# Patient Record
Sex: FEMALE | Race: WHITE | NOT HISPANIC OR LATINO | Employment: FULL TIME | URBAN - METROPOLITAN AREA
[De-identification: names, ages, dates, MRNs, and addresses within clinical notes are randomized per-mention and may not be internally consistent; named-entity substitution may affect disease eponyms.]

---

## 2017-01-03 ENCOUNTER — APPOINTMENT (OUTPATIENT)
Dept: PHYSICAL THERAPY | Facility: CLINIC | Age: 32
End: 2017-01-03
Payer: COMMERCIAL

## 2017-01-03 PROCEDURE — 97110 THERAPEUTIC EXERCISES: CPT

## 2017-01-03 PROCEDURE — 97140 MANUAL THERAPY 1/> REGIONS: CPT

## 2017-01-05 ENCOUNTER — APPOINTMENT (OUTPATIENT)
Dept: PHYSICAL THERAPY | Facility: CLINIC | Age: 32
End: 2017-01-05
Payer: COMMERCIAL

## 2017-01-05 PROCEDURE — 97110 THERAPEUTIC EXERCISES: CPT

## 2017-01-05 PROCEDURE — 97140 MANUAL THERAPY 1/> REGIONS: CPT

## 2017-01-11 ENCOUNTER — APPOINTMENT (OUTPATIENT)
Dept: PHYSICAL THERAPY | Facility: CLINIC | Age: 32
End: 2017-01-11
Payer: COMMERCIAL

## 2017-01-11 PROCEDURE — 97140 MANUAL THERAPY 1/> REGIONS: CPT

## 2017-01-11 PROCEDURE — 97110 THERAPEUTIC EXERCISES: CPT

## 2017-01-13 ENCOUNTER — APPOINTMENT (OUTPATIENT)
Dept: PHYSICAL THERAPY | Facility: CLINIC | Age: 32
End: 2017-01-13
Payer: COMMERCIAL

## 2017-01-16 ENCOUNTER — APPOINTMENT (OUTPATIENT)
Dept: PHYSICAL THERAPY | Facility: CLINIC | Age: 32
End: 2017-01-16
Payer: COMMERCIAL

## 2017-01-16 PROCEDURE — 97110 THERAPEUTIC EXERCISES: CPT

## 2017-01-16 PROCEDURE — 97140 MANUAL THERAPY 1/> REGIONS: CPT

## 2017-07-12 ENCOUNTER — OFFICE VISIT (OUTPATIENT)
Dept: URGENT CARE | Facility: CLINIC | Age: 32
End: 2017-07-12
Payer: COMMERCIAL

## 2017-07-12 PROCEDURE — G0382 LEV 3 HOSP TYPE B ED VISIT: HCPCS

## 2017-07-12 PROCEDURE — 99283 EMERGENCY DEPT VISIT LOW MDM: CPT

## 2017-07-25 ENCOUNTER — OFFICE VISIT (OUTPATIENT)
Dept: URGENT CARE | Facility: CLINIC | Age: 32
End: 2017-07-25
Payer: COMMERCIAL

## 2017-07-25 ENCOUNTER — APPOINTMENT (OUTPATIENT)
Dept: URGENT CARE | Facility: CLINIC | Age: 32
End: 2017-07-25
Payer: COMMERCIAL

## 2017-07-25 ENCOUNTER — TRANSCRIBE ORDERS (OUTPATIENT)
Dept: URGENT CARE | Facility: CLINIC | Age: 32
End: 2017-07-25

## 2017-07-25 DIAGNOSIS — R07.9 CHEST PAIN, UNSPECIFIED TYPE: ICD-10-CM

## 2017-07-25 DIAGNOSIS — R07.9 CHEST PAIN, UNSPECIFIED TYPE: Primary | ICD-10-CM

## 2017-07-25 PROCEDURE — 99283 EMERGENCY DEPT VISIT LOW MDM: CPT

## 2017-07-25 PROCEDURE — G0382 LEV 3 HOSP TYPE B ED VISIT: HCPCS

## 2019-12-17 ENCOUNTER — APPOINTMENT (OUTPATIENT)
Dept: RADIOLOGY | Facility: MEDICAL CENTER | Age: 34
End: 2019-12-17
Payer: COMMERCIAL

## 2019-12-17 ENCOUNTER — OFFICE VISIT (OUTPATIENT)
Dept: URGENT CARE | Facility: MEDICAL CENTER | Age: 34
End: 2019-12-17
Payer: COMMERCIAL

## 2019-12-17 VITALS
SYSTOLIC BLOOD PRESSURE: 140 MMHG | DIASTOLIC BLOOD PRESSURE: 98 MMHG | TEMPERATURE: 98 F | HEART RATE: 83 BPM | RESPIRATION RATE: 20 BRPM | OXYGEN SATURATION: 98 %

## 2019-12-17 DIAGNOSIS — M54.41 ACUTE RIGHT-SIDED LOW BACK PAIN WITH RIGHT-SIDED SCIATICA: ICD-10-CM

## 2019-12-17 DIAGNOSIS — M25.511 ACUTE PAIN OF RIGHT SHOULDER: ICD-10-CM

## 2019-12-17 DIAGNOSIS — M54.2 NECK PAIN: ICD-10-CM
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
## 2019-12-17 PROCEDURE — 99214 OFFICE O/P EST MOD 30 MIN: CPT | Performed by: PHYSICIAN ASSISTANT

## 2019-12-17 PROCEDURE — 96372 THER/PROPH/DIAG INJ SC/IM: CPT | Performed by: PHYSICIAN ASSISTANT

## 2019-12-17 PROCEDURE — 72040 X-RAY EXAM NECK SPINE 2-3 VW: CPT

## 2019-12-17 PROCEDURE — 73030 X-RAY EXAM OF SHOULDER: CPT

## 2019-12-17 RX ORDER — KETOROLAC TROMETHAMINE 30 MG/ML
30 INJECTION, SOLUTION INTRAMUSCULAR; INTRAVENOUS ONCE
Status: COMPLETED | OUTPATIENT
Start: 2019-12-17 | End: 2019-12-17

## 2019-12-17 RX ADMIN — KETOROLAC TROMETHAMINE 30 MG: 30 INJECTION, SOLUTION INTRAMUSCULAR; INTRAVENOUS at 11:26

## 2019-12-17 NOTE — PATIENT INSTRUCTIONS
May take Tylenol or Motrin for pain  Ice the affected areas   follow-up with PCP if symptoms do not improve   report to ER if symptoms worsen    Acute Low Back Pain   WHAT YOU NEED TO KNOW:   Acute low back pain is sudden discomfort in your lower back area that lasts for up to 6 weeks  The discomfort makes it difficult to tolerate activity  DISCHARGE INSTRUCTIONS:   Return to the emergency department if:   · You have severe pain  · You have sudden stiffness and heaviness on both buttocks down to both legs  · You have numbness or weakness in one leg, or pain in both legs  · You have numbness in your genital area or across your lower back  · You cannot control your urine or bowel movements  Contact your healthcare provider if:   · You have a fever  · You have pain at night or when you rest     · Your pain does not get better with treatment  · You have pain that worsens when you cough or sneeze  · You suddenly feel something pop or snap in your back  · You have questions or concerns about your condition or care  Medicines: The following medicines may be ordered by your healthcare provider:  · Acetaminophen  decreases pain  It is available without a doctor's order  Ask how much to take and how often to take it  Follow directions  Acetaminophen can cause liver damage if not taken correctly  · NSAIDs  help decrease swelling and pain  This medicine is available with or without a doctor's order  NSAIDs can cause stomach bleeding or kidney problems in certain people  If you take blood thinner medicine, always ask your healthcare provider if NSAIDs are safe for you  Always read the medicine label and follow directions  · Prescription pain medicine  may be given  Ask your healthcare provider how to take this medicine safely  · Muscle relaxers  decrease pain by relaxing the muscles in your lower spine  · Take your medicine as directed    Contact your healthcare provider if you think your medicine is not helping or if you have side effects  Tell him of her if you are allergic to any medicine  Keep a list of the medicines, vitamins, and herbs you take  Include the amounts, and when and why you take them  Bring the list or the pill bottles to follow-up visits  Carry your medicine list with you in case of an emergency  Self-care:   · Stay active  as much as you can without causing more pain  Bed rest could make your back pain worse  Start with some light exercises such as walking  Avoid heavy lifting until your pain is gone  Ask for more information about the activities or exercises that are right for you  · Ice  helps decrease swelling, pain, and muscle spams  Put crushed ice in a plastic bag  Cover it with a towel  Place it on your lower back for 20 to 30 minutes every 2 hours  Do this for about 2 to 3 days after your pain starts, or as directed  · Heat  helps decrease pain and muscle spasms  Start to use heat after treatment with ice has stopped  Use a small towel dampened with warm water or a heating pad, or sit in a warm bath  Apply heat on the area for 20 to 30 minutes every 2 hours for as many days as directed  Alternate heat and ice  Prevent acute low back pain:   · Use proper body mechanics  ¨ Bend at the hips and knees when you  objects  Do not bend from the waist  Use your leg muscles as you lift the load  Do not use your back  Keep the object close to your chest as you lift it  Try not to twist or lift anything above your waist     ¨ Change your position often when you stand for long periods of time  Rest one foot on a small box or footrest, and then switch to the other foot often  ¨ Try not to sit for long periods of time  When you do, sit in a straight-backed chair with your feet flat on the floor  Never reach, pull, or push while you are sitting  · Do exercises that strengthen your back muscles  Warm up before you exercise   Ask your healthcare provider the best exercises for you  · Maintain a healthy weight  Ask your healthcare provider how much you should weigh  Ask him to help you create a weight loss plan if you are overweight  Follow up with your healthcare provider as directed:  Return for a follow-up visit if you still have pain after 1 to 3 weeks of treatment  You may need to visit an orthopedist if your back pain lasts more than 12 weeks  Write down your questions so you remember to ask them during your visits  © 2017 [facility] Information is for End User's use only and may not be sold, redistributed or otherwise used for commercial purposes  All illustrations and images included in CareNotes® are the copyrighted property of [employer]  or [employer]  The above information is an  only  It is not intended as medical advice for individual conditions or treatments  Talk to your doctor, nurse or pharmacist before following any medical regimen to see if it is safe and effective for you  Shoulder Pain   WHAT YOU NEED TO KNOW:   Shoulder pain is a common problem and can affect your daily activities  Pain can be caused by a problem within your shoulder  Shoulder pain may also be caused by pain that spreads to your shoulder from another part of your body  DISCHARGE INSTRUCTIONS:   Return to the emergency department if:   · You have severe pain  · You cannot move your arm or shoulder  · You have numbness or tingling in your shoulder or arm  Contact your healthcare provider if:   · Your pain gets worse or does not go away with treatment  · You have trouble moving your arm or shoulder  · You have questions or concerns about your condition or care  Medicines: You may need any of the following:  · Acetaminophen  decreases pain and fever  It is available without a doctor's order  Ask how much to take and how often to take it  Follow directions   Acetaminophen can cause liver damage if not taken correctly  · NSAIDs , such as ibuprofen, help decrease swelling, pain, and fever  This medicine is available with or without a doctor's order  NSAIDs can cause stomach bleeding or kidney problems in certain people  If you take blood thinner medicine, always ask your healthcare provider if NSAIDs are safe for you  Always read the medicine label and follow directions  · Take your medicine as directed  Contact your healthcare provider if you think your medicine is not helping or if you have side effects  Tell him of her if you are allergic to any medicine  Keep a list of the medicines, vitamins, and herbs you take  Include the amounts, and when and why you take them  Bring the list or the pill bottles to follow-up visits  Carry your medicine list with you in case of an emergency  Follow up with your healthcare provider or orthopedist as directed:  Write down your questions so you remember to ask them during your visits  Manage your symptoms:   · Apply ice  on your shoulder for 20 to 30 minutes every 2 hours or as directed  Use an ice pack, or put crushed ice in a plastic bag  Cover it with a towel  Ice helps prevent tissue damage and decreases swelling and pain  · Apply heat if ice does not help your symptoms  Apply heat on your shoulder for 20 to 30 minutes every 2 hours for as many days as directed  Heat helps decrease pain and muscle spasms  · Go to physical or occupational therapy as directed  A physical therapist teaches you exercises to help improve movement and strength, and to decrease pain  An occupational therapist teaches you skills to help with your daily activities  Prevent shoulder pain:   · Stretch and strengthen your shoulder  Use proper technique during exercises and sports  · Limit activities as directed  Try to avoid repeated overhead movements    © 2017 [employer] Information is for End User's use only and may not be sold, redistributed or otherwise used for commercial purposes  All illustrations and images included in CareNotes® are the copyrighted property of [employer]  or [employer]  The above information is an  only  It is not intended as medical advice for individual conditions or treatments  Talk to your doctor, nurse or pharmacist before following any medical regimen to see if it is safe and effective for you

## 2019-12-17 NOTE — LETTER
December 17, 2019     Patient: Mateusz Leroy   YOB: 1985   Date of Visit: 12/17/2019       To Whom it May Concern:    Sujata Ramirez was seen in my clinic on 12/17/2019  Please excuse from work  If you have any questions or concerns, please don't hesitate to call           Sincerely,          Tom Shore PA-C        CC: Mateusz Thakurciara

## 2021-12-26 ENCOUNTER — HOSPITAL ENCOUNTER (EMERGENCY)
Facility: HOSPITAL | Age: 36
Discharge: HOME | End: 2021-12-26
Attending: EMERGENCY MEDICINE | Admitting: EMERGENCY MEDICINE
Payer: COMMERCIAL

## 2021-12-26 VITALS
HEIGHT: 69 IN | BODY MASS INDEX: 32.58 KG/M2 | SYSTOLIC BLOOD PRESSURE: 149 MMHG | DIASTOLIC BLOOD PRESSURE: 97 MMHG | WEIGHT: 220 LBS | OXYGEN SATURATION: 98 % | TEMPERATURE: 97.8 F | RESPIRATION RATE: 18 BRPM | HEART RATE: 77 BPM

## 2021-12-26 DIAGNOSIS — U07.1 CLINICAL DIAGNOSIS OF COVID-19: Primary | ICD-10-CM

## 2021-12-26 PROCEDURE — 99282 EMERGENCY DEPT VISIT SF MDM: CPT | Performed by: PHYSICIAN ASSISTANT

## 2021-12-26 PROCEDURE — U0005 INFEC AGEN DETEC AMPLI PROBE: HCPCS | Performed by: PHYSICIAN ASSISTANT

## 2021-12-26 PROCEDURE — 99284 EMERGENCY DEPT VISIT MOD MDM: CPT

## 2021-12-26 PROCEDURE — U0003 INFECTIOUS AGENT DETECTION BY NUCLEIC ACID (DNA OR RNA); SEVERE ACUTE RESPIRATORY SYNDROME CORONAVIRUS 2 (SARS-COV-2) (CORONAVIRUS DISEASE [COVID-19]), AMPLIFIED PROBE TECHNIQUE, MAKING USE OF HIGH THROUGHPUT TECHNOLOGIES AS DESCRIBED BY CMS-2020-01-R: HCPCS | Performed by: PHYSICIAN ASSISTANT

## 2021-12-26 NOTE — DISCHARGE INSTRUCTIONS
Take 600mg of Ibuprofen every 6-8 hours with food as needed for pain  You may also take 1000mg of Tylenol every 6-8 hours as needed for pain with a maximum dose of 3000mg of tylenol per day  They are safe to take together or you may alternate them if you prefer  Take Vitamin D 2000 IU daily, Vitamin C 1000mg twice daily and a multivitamin with zinc in it as it has been shown to help COVID symptoms  These can all be found over the counter  You were considered safe to be discharged with COVID however if you get increasingly short of breath or have any chest pain, please return to the emergency department  It may be helpful for you to get a pulse oximeter from a pharmacy  If your oxygen level is 92% or less a while at rest, please return to the ER  Anybody that you have been around especially without a mask recently should also quarantine and get tested if they have symptoms  You should plan to quarantine for 14 days  Please follow-up with family doctor and if your symptoms are considered mild, you may only need to quarantine for 10 days and if you are vaccinated, you may only need to quarantine for 7 days

## 2021-12-26 NOTE — ED PROVIDER NOTES
HPI: Patient is a 39 y o  female who presents with 1 days of chills, cough, headache, sore throat, anorexia and fatigue which the patient describes at moderate  Patient states my coffee taste like water  The patient has had contact with people with similar symptoms  Patient's mother recently tested positive for COVID  The patient has not taken any medication  Patient is not vaccinated against COVID-19  No Known Allergies    Past Medical History:   Diagnosis Date    Brain aneurysm       Past Surgical History:   Procedure Laterality Date    BRAIN SURGERY       Social History     Tobacco Use    Smoking status: Never Smoker    Smokeless tobacco: Never Used   Vaping Use    Vaping Use: Never used   Substance Use Topics    Alcohol use: Never    Drug use: Never       Nursing notes reviewed  Physical Exam:  ED Triage Vitals [12/26/21 1417]   Temperature Pulse Respirations Blood Pressure SpO2   97 8 °F (36 6 °C) 77 18 149/97 98 %      Temp Source Heart Rate Source Patient Position - Orthostatic VS BP Location FiO2 (%)   Temporal Monitor Sitting Right arm --      Pain Score       --           ROS: Positive for headache, chest tightness, cough, anosmia, the remainder of a 10 organ system ROS was otherwise unremarkable  General: awake, alert, no acute distress    Head: normocephalic, atraumatic    Eyes: no scleral icterus  Ears: external ears normal, hearing grossly intact  Nose: external exam grossly normal, negative nasal discharge  Neck: symmetric, No JVD noted, trachea midline  Pulmonary: no respiratory distress, no tachypnea noted  Cardiovascular: appears well perfused  Abdomen: no distention noted  Musculoskeletal: no deformities noted, tone normal  Neuro: grossly non-focal  Psych: mood and affect appropriate    The patient is stable and has a history and physical exam consistent with a viral illness  COVID19 testing has been performed    I considered the patient's other medical conditions as applicable/noted above in my medical decision making  The patient is stable upon discharge  The plan is for supportive care at home  The patient (and any family present) verbalized understanding of the discharge instructions and warnings that would necessitate return to the Emergency Department  All questions were answered prior to discharge  Will disposition patient per current [facility] Guidelines:  Updated 2/3/21    Low risk patient SpO2 ? 90% (rest/ambulation)  For High Risk patient and SpO2 >/= 92% (rest/ambulation)   Dispo: DC and call PCP within 24 hours    DC Meds:  Vit D3 2000 IU PO Daily  Vit C 1g PO Q12  Multivitamin Daily  Consider Colchicine 0 6mg BID x3 days followed by   0 6mg daily x 27 days for high-risk, non-pregnant  patients (contraindicated in patients with eGFR < 30)    Home Pulse Ox:  Recommend returning if O2 drops below above levels     Low risk patient SpO2 ? 90% (at rest) but < 90% (w/ ambulation)  High Risk patient* SpO2 ? 92% (at rest) but <92% (w/ ambulation)   Dispo: Consider admission - if severe sx or to f/u closely with pcp   Consider DC - if mild sx    DC Meds:  Vit D3 2000 IU PO Daily  Vit C 1g PO Q12  Multivitamin Daily  Consider Colchicine 0 6mg BID x3 days   followed by 0 6mg daily x 27 days for highrisk, non-pregnant patients (contraindicated  in patients with eGFR < 30)     Home Pulse Ox:  Recommend returning if O2 drops below above levels     Low risk patient SpO2 < 90%  High Risk patient* SpO2 < 92%   Dispo:  Admit       **always call PCP in 24 hours if D/C  **High Risk Definition: age >71, BMI>35, immunocompromised, CKD or chronic heart/lung disease, pregnancy    Medications - No data to display  Final diagnoses:   Clinical diagnosis of COVID-19     Time reflects when diagnosis was documented in both MDM as applicable and the Disposition within this note     Time User Action Codes Description Comment    12/26/2021  2:13 PM Jacquelyn Appiah Add [U07 1] Clinical diagnosis of COVID-19       ED Disposition     ED Disposition Condition Date/Time Comment    Discharge Stable Sun Dec 26, 2021  2:12 PM Rupert Egciara discharge to home/self care  Follow-up Information    None       There are no discharge medications for this patient  No discharge procedures on file      Electronically Signed by       Chito Andrew PA-C  12/26/21 9262

## 2021-12-26 NOTE — Clinical Note
Marco Cantu was seen and treated in our emergency department on 12/26/2021  Diagnosis:     Ericlizbet Rodríguez    She may return on this date: 01/06/2022         If you have any questions or concerns, please don't hesitate to call        Burgess Rosanna PA-C    ______________________________           _______________          _______________  Hospital Representative                              Date                                Time

## 2021-12-27 LAB — SARS-COV-2 RNA RESP QL NAA+PROBE: NEGATIVE

## 2022-05-08 ENCOUNTER — APPOINTMENT (EMERGENCY)
Dept: RADIOLOGY | Facility: HOSPITAL | Age: 37
End: 2022-05-08
Payer: COMMERCIAL

## 2022-05-08 ENCOUNTER — HOSPITAL ENCOUNTER (EMERGENCY)
Facility: HOSPITAL | Age: 37
Discharge: HOME | End: 2022-05-08
Attending: EMERGENCY MEDICINE | Admitting: EMERGENCY MEDICINE
Payer: COMMERCIAL

## 2022-05-08 VITALS
RESPIRATION RATE: 16 BRPM | HEART RATE: 78 BPM | OXYGEN SATURATION: 99 % | TEMPERATURE: 98.5 F | DIASTOLIC BLOOD PRESSURE: 78 MMHG | SYSTOLIC BLOOD PRESSURE: 155 MMHG

## 2022-05-08 DIAGNOSIS — S89.90XA KNEE INJURY: Primary | ICD-10-CM

## 2022-05-08 PROCEDURE — 99283 EMERGENCY DEPT VISIT LOW MDM: CPT

## 2022-05-08 PROCEDURE — 96372 THER/PROPH/DIAG INJ SC/IM: CPT

## 2022-05-08 PROCEDURE — 73564 X-RAY EXAM KNEE 4 OR MORE: CPT

## 2022-05-08 PROCEDURE — 99285 EMERGENCY DEPT VISIT HI MDM: CPT | Performed by: EMERGENCY MEDICINE

## 2022-05-08 RX ORDER — IBUPROFEN 600 MG/1
600 TABLET ORAL EVERY 6 HOURS PRN
Qty: 30 TABLET | Refills: 0 | Status: SHIPPED | OUTPATIENT
Start: 2022-05-08 | End: 2022-06-03

## 2022-05-08 RX ORDER — OXYCODONE HYDROCHLORIDE AND ACETAMINOPHEN 5; 325 MG/1; MG/1
1 TABLET ORAL EVERY 6 HOURS PRN
Qty: 5 TABLET | Refills: 0 | Status: SHIPPED | OUTPATIENT
Start: 2022-05-08 | End: 2022-05-11

## 2022-05-08 RX ORDER — HYDROMORPHONE HCL/PF 1 MG/ML
1 SYRINGE (ML) INJECTION ONCE
Status: COMPLETED | OUTPATIENT
Start: 2022-05-08 | End: 2022-05-08

## 2022-05-08 RX ADMIN — HYDROMORPHONE HYDROCHLORIDE 1 MG: 1 INJECTION, SOLUTION INTRAMUSCULAR; INTRAVENOUS; SUBCUTANEOUS at 15:36

## 2022-05-08 NOTE — ED PROVIDER NOTES
History  Chief Complaint   Patient presents with    Knee Injury     53-year-old female presenting with left-sided knee pain after she was jumping over a puddle while hiking  Her knee bent inwards towards or other knee when she landed  She had pain at that area immediately  She denies weakness or numbness, head injury LOC, ac      Knee Pain  Pain details:     Quality:  Dull    Radiates to:  Does not radiate    Severity:  Mild    Timing:  Constant  Chronicity:  New  Associated symptoms: no back pain and no fever        None       Past Medical History:   Diagnosis Date    Brain aneurysm        Past Surgical History:   Procedure Laterality Date    BRAIN SURGERY         History reviewed  No pertinent family history  I have reviewed and agree with the history as documented  E-Cigarette/Vaping    E-Cigarette Use Never User      E-Cigarette/Vaping Substances     Social History     Tobacco Use    Smoking status: Never Smoker    Smokeless tobacco: Never Used   Vaping Use    Vaping Use: Never used   Substance Use Topics    Alcohol use: Never    Drug use: Never       Review of Systems   Constitutional: Negative for chills and fever  HENT: Negative for congestion, nosebleeds, rhinorrhea and sore throat  Eyes: Negative for pain and visual disturbance  Respiratory: Negative for cough and wheezing  Cardiovascular: Negative for chest pain and leg swelling  Gastrointestinal: Negative for abdominal distention, abdominal pain, diarrhea, nausea and vomiting  Genitourinary: Negative for dysuria and frequency  Musculoskeletal: Positive for arthralgias  Negative for back pain and joint swelling  Skin: Negative for rash and wound  Neurological: Negative for weakness and numbness  Psychiatric/Behavioral: Negative for decreased concentration and suicidal ideas  Physical Exam  Physical Exam  Vitals and nursing note reviewed  Constitutional:       Appearance: She is normal weight     HENT:      Head: Normocephalic and atraumatic  Eyes:      Conjunctiva/sclera: Conjunctivae normal       Pupils: Pupils are equal, round, and reactive to light  Cardiovascular:      Rate and Rhythm: Normal rate and regular rhythm  Pulmonary:      Effort: Pulmonary effort is normal  No respiratory distress  Abdominal:      General: Abdomen is flat  There is no distension  Musculoskeletal:         General: No swelling or deformity  Cervical back: Normal range of motion and neck supple  Comments: Generalized left knee tenderness, mild laxity with valgus stress   Skin:     General: Skin is dry  Coloration: Skin is not jaundiced  Neurological:      General: No focal deficit present  Mental Status: She is alert and oriented to person, place, and time  Psychiatric:         Mood and Affect: Mood normal          Thought Content:  Thought content normal          Vital Signs  ED Triage Vitals   Temperature Pulse Respirations Blood Pressure SpO2   05/08/22 1509 05/08/22 1509 05/08/22 1509 05/08/22 1509 05/08/22 1509   98 5 °F (36 9 °C) 78 16 155/78 99 %      Temp Source Heart Rate Source Patient Position - Orthostatic VS BP Location FiO2 (%)   05/08/22 1509 05/08/22 1509 05/08/22 1509 05/08/22 1509 --   Tympanic Monitor Sitting Left arm       Pain Score       05/08/22 1536       10 - Worst Possible Pain           Vitals:    05/08/22 1509   BP: 155/78   Pulse: 78   Patient Position - Orthostatic VS: Sitting         Visual Acuity      ED Medications  Medications   HYDROmorphone (DILAUDID) injection 1 mg (1 mg Intramuscular Given 5/8/22 1536)       Diagnostic Studies  Results Reviewed     None                 XR knee 4+ vw left injury    (Results Pending)              Procedures  Procedures         ED Course                                             MDM  Number of Diagnoses or Management Options  Knee injury  Diagnosis management comments: Patient with isolated injury to left knee, medial tenderness, suspect possible patella dislocation with spontaneous reduction, as well as MCL sprain  X-rays reassuring, patient placed in knee immobilizer and crutches, nonweightbearing with orthopedic follow-up  Pain improved after IM Dilaudid, she is comfortable with discharge at this time, ibuprofen Tylenol, ice, Percocet for breakthrough pain if needed       Amount and/or Complexity of Data Reviewed  Review and summarize past medical records: yes  Independent visualization of images, tracings, or specimens: yes    Risk of Complications, Morbidity, and/or Mortality  Presenting problems: high  Diagnostic procedures: minimal  Management options: high        Disposition  Final diagnoses:   Knee injury     Time reflects when diagnosis was documented in both MDM as applicable and the Disposition within this note     Time User Action Codes Description Comment    5/8/2022  4:33 PM Kenneth, [address] Knee injury       ED Disposition     ED Disposition Condition Date/Time Comment    Discharge Stable Sun May 8, 2022  4:33 PM Sreekanth Pak discharge to home/self care              Follow-up Information     Follow up With Specialties Details Why [address], DO Orthopedic Surgery Schedule an appointment as soon as possible for a visit   [address] [address]  [phone]            Patient's Medications   Discharge Prescriptions    IBUPROFEN (MOTRIN) 600 MG TABLET    Take 1 tablet (600 mg total) by mouth every 6 (six) hours as needed for mild pain       Start Date: 5/8/2022  End Date: --       Order Dose: 600 mg       Quantity: 30 tablet    Refills: 0    OXYCODONE-ACETAMINOPHEN (PERCOCET) 5-325 MG PER TABLET    Take 1 tablet by mouth every 6 (six) hours as needed for moderate pain or severe pain for up to 3 days Max Daily Amount: 4 tablets       Start Date: 5/8/2022  End Date: 5/11/2022       Order Dose: 1 tablet       Quantity: 5 tablet    Refills: 0           PDMP Review Value Time User    PDMP Reviewed  Yes 5/8/2022  4:07 PM Alondra Sharpe DO          ED Provider  Electronically Signed by           Alondra Sharpe DO  05/08/22 6977

## 2022-05-08 NOTE — Clinical Note
Gifty Estrada was seen and treated in our emergency department on 5/8/2022  No restrictions            Diagnosis:     Sandy    She may return on this date: 05/10/2022         If you have any questions or concerns, please don't hesitate to call        Ana Bunn, DO    ______________________________           _______________          _______________  Hospital Representative                              Date                                Time

## 2022-05-10 ENCOUNTER — OFFICE VISIT (OUTPATIENT)
Dept: OBGYN CLINIC | Facility: MEDICAL CENTER | Age: 37
End: 2022-05-10
Payer: COMMERCIAL

## 2022-05-10 VITALS
BODY MASS INDEX: 32.49 KG/M2 | DIASTOLIC BLOOD PRESSURE: 94 MMHG | SYSTOLIC BLOOD PRESSURE: 148 MMHG | HEART RATE: 90 BPM | HEIGHT: 69 IN

## 2022-05-10 DIAGNOSIS — S89.90XA KNEE INJURY: ICD-10-CM

## 2022-05-10 DIAGNOSIS — S83.207A MCMURRAY TEST POSITIVE, LEFT, INITIAL ENCOUNTER: ICD-10-CM

## 2022-05-10 DIAGNOSIS — S89.92XA KNEE INJURY, LEFT, INITIAL ENCOUNTER: Primary | ICD-10-CM

## 2022-05-10 PROCEDURE — 99204 OFFICE O/P NEW MOD 45 MIN: CPT | Performed by: FAMILY MEDICINE

## 2022-05-10 NOTE — LETTER
May 10, 2022     Patient: Mateusz Leroy  YOB: 1985  Date of Visit: 5/10/2022      To Whom it May Concern:    Sujata Ramirez is under my professional care  Cecilia Alexasandoval was seen in my office on 5/10/2022  Cecilia Wilson is excused from work due to the medical condition I am treating her for  If you have any questions or concerns, please don't hesitate to call           Sincerely,          Renetta Rodriguez MD        CC: Mateusz Leroy

## 2022-05-10 NOTE — PROGRESS NOTES
[facility] CARE SPECIALISTS [address]  [facility] [address] [phone]      Chief Complaint:  Chief Complaint   Patient presents with    Left Knee - Pain       Vitals:  /94   Pulse 90   Ht 5' 9" (1 753 m)   BMI 32 49 kg/m²     The following portions of the patient's history were reviewed and updated as appropriate: allergies, current medications, past family history, past medical history, past social history, past surgical history, and problem list       Subjective:   Patient ID: Alyssa Mast is a 44 y o  female  Here c/o L knee pain  5/8/22 she jumped over a puddle and leg went out to the side and fell to the ground  Cant bear weight or bend knee  Seen in ER  XR done  Crutches/knee immobilizer  Swelled up  Icing/elevated  Taking ibuprofen/perocet PRN>  Constant pain/dull pain  Sharp with movement  Review of Systems   Constitutional: Negative for fatigue and fever  Respiratory: Negative for shortness of breath  Cardiovascular: Negative for chest pain  Gastrointestinal: Negative for abdominal pain and nausea  Genitourinary: Negative for dysuria  Musculoskeletal: Positive for arthralgias  Skin: Negative for rash and wound  Neurological: Negative for weakness and headaches  Objective:  Left Knee Exam     Tenderness   The patient is experiencing tenderness in the medial joint line  Range of Motion   Extension: normal   Flexion: abnormal     Tests   Orlando:  Medial - positive Lateral - negative  Varus: negative Valgus: negative    Other   Swelling: mild  Effusion: no effusion present    Comments:  Difficulty bending knee- patient having too much pain  Observations   Left Knee   Negative for effusion  Physical Exam  Constitutional:       Appearance: Normal appearance  She is normal weight  HENT:      Head: Normocephalic  Eyes:      Extraocular Movements: Extraocular movements intact     Pulmonary: Effort: Pulmonary effort is normal    Musculoskeletal:         General: Tenderness present  Cervical back: Normal range of motion  Left knee: No effusion  Instability Tests: Medial Orlando test positive  Lateral Orlando test negative  Skin:     General: Skin is warm and dry  Neurological:      General: No focal deficit present  Mental Status: She is alert and oriented to person, place, and time  Mental status is at baseline  Psychiatric:         Mood and Affect: Mood normal          Behavior: Behavior normal          Thought Content: Thought content normal          Judgment: Judgment normal          I have personally reviewed pertinent films in PACS and my interpretation is XR-  L  knee- nml study  Assessment/Plan:  Assessment/Plan   Diagnoses and all orders for this visit:    Knee injury, left, initial encounter  -     MRI knee left  wo contrast; Future    Orlando test positive, left, initial encounter  -     MRI knee left  wo contrast; Future    Knee injury  -     Ambulatory Referral to Orthopedic Surgery        Return for f/u after MRI L knee, Recheck       Renata Wolfe MD

## 2022-05-10 NOTE — PATIENT INSTRUCTIONS
F/u for MRI  MRI- L knee- L knee pain/injury/pos nehemias test  xr neg  r/o meniscal tear  Patient has hx of brain aneurysm with stent/coil placed 10 years ago  Continue wearing knee immobilizer/crutches  Icing/OTC pain meds as needed

## 2022-05-18 ENCOUNTER — TELEPHONE (OUTPATIENT)
Dept: OBGYN CLINIC | Facility: HOSPITAL | Age: 37
End: 2022-05-18

## 2022-05-18 NOTE — TELEPHONE ENCOUNTER
Patient called stating she can't have her MRI due the aneurysm stent she has  [facility] can not locate her records  Is there another type of scan that she can have done?      # [phone]

## 2022-05-20 NOTE — TELEPHONE ENCOUNTER
Spoke with [facility] and received a note from Dr Joslyn Abreu stating "Her implantable device is MRI compatible " Note was emailed to MRI @ [place] to [employer] attention to have Radiology review for rescheduling  This note from Dr Joslyn Abreu will also be scanned into pts chart as well  I will notify patient once I receive notification to reschedule

## 2022-05-20 NOTE — TELEPHONE ENCOUNTER
With the contrast shortage I will obtain the stent  information  Angiogram stent placed 2008 and 2012 with [facility] Neuro Dr Jarad Snow [phone]  Patient is aware this may delay her scheduling she understands

## 2022-05-23 ENCOUNTER — OFFICE VISIT (OUTPATIENT)
Dept: OBGYN CLINIC | Facility: CLINIC | Age: 37
End: 2022-05-23
Payer: COMMERCIAL

## 2022-05-23 VITALS
TEMPERATURE: 98.3 F | HEIGHT: 69 IN | SYSTOLIC BLOOD PRESSURE: 139 MMHG | HEART RATE: 78 BPM | OXYGEN SATURATION: 99 % | BODY MASS INDEX: 31.1 KG/M2 | DIASTOLIC BLOOD PRESSURE: 89 MMHG | WEIGHT: 210 LBS

## 2022-05-23 DIAGNOSIS — S89.92XD LEFT KNEE INJURY, SUBSEQUENT ENCOUNTER: Primary | ICD-10-CM

## 2022-05-23 DIAGNOSIS — S83.207D POSITIVE MCMURRAY TEST OF LEFT KNEE, SUBSEQUENT ENCOUNTER: ICD-10-CM

## 2022-05-23 PROCEDURE — 99213 OFFICE O/P EST LOW 20 MIN: CPT | Performed by: FAMILY MEDICINE

## 2022-05-23 NOTE — PROGRESS NOTES
[facility] SPECIALISTS [place]  [address]  [place] [id number] [phone]      Chief Complaint:  Chief Complaint   Patient presents with    Left Knee - Follow-up       Vitals:  /89 (BP Location: Left arm, Patient Position: Sitting, Cuff Size: Standard)   Pulse 78   Temp 98 3 °F (36 8 °C)   Ht 5' 9" (1 753 m) Comment: verbal  Wt 95 3 kg (210 lb)   SpO2 99%   BMI 31 01 kg/m²     The following portions of the patient's history were reviewed and updated as appropriate: allergies, current medications, past family history, past medical history, past social history, past surgical history, and problem list       Subjective:   Patient ID: Viv Leiva is a 44 y o  female  Here for f/u  L knee pain  Unable to get MRI yet due to coils- just received clearance from surgeon- MRI compatible  She is still having R knee pain  Hurts to bend  Using knee immob/NWB  Icing/elevated  Taking ibuprofen/perocet PRn  Constant pain/dull pain  Sharp with movement          Review of Systems   Constitutional: Negative for fatigue and fever  Respiratory: Negative for shortness of breath  Cardiovascular: Negative for chest pain  Gastrointestinal: Negative for abdominal pain and nausea  Genitourinary: Negative for dysuria  Musculoskeletal: Positive for arthralgias and gait problem  Skin: Negative for rash and wound  Neurological: Positive for numbness  Negative for weakness and headaches  Objective:  Right Knee Exam     Tenderness   The patient is experiencing tenderness in the medial joint line  Range of Motion   Extension: normal   Flexion: abnormal     Tests   Varus: positive     Other   Effusion: effusion present          Observations     Right Knee   Positive for effusion  Physical Exam  Constitutional:       Appearance: Normal appearance  She is normal weight  HENT:      Head: Normocephalic     Eyes:      Extraocular Movements: Extraocular movements intact  Pulmonary:      Effort: Pulmonary effort is normal    Musculoskeletal:         General: Tenderness present  Cervical back: Normal range of motion  Right knee: Effusion present  Skin:     General: Skin is warm and dry  Neurological:      General: No focal deficit present  Mental Status: She is alert and oriented to person, place, and time  Mental status is at baseline  Psychiatric:         Mood and Affect: Mood normal          Behavior: Behavior normal          Thought Content: Thought content normal          Judgment: Judgment normal                Assessment/Plan:  Assessment/Plan   Diagnoses and all orders for this visit:    Left knee injury, subsequent encounter    Positive Orlando test of left knee, subsequent encounter        Return for f/u after MRI, Recheck       Pamela Johnson MD

## 2022-05-23 NOTE — LETTER
May 23, 2022     Patient: Melanie Nava  YOB: 1985  Date of Visit: 5/23/2022      To Whom it May Concern:    Sukhi Beltran is under my professional care  Rodriguez Hernandez was seen in my office on 5/23/2022  Rodriguez Hernandez is excused from work due to the medical condition I am treating them for       If you have any questions or concerns, please don't hesitate to call           Sincerely,          Petros Stewart MD        CC: Melanie Nava

## 2022-05-24 ENCOUNTER — TELEPHONE (OUTPATIENT)
Dept: OBGYN CLINIC | Facility: HOSPITAL | Age: 37
End: 2022-05-24

## 2022-05-24 NOTE — TELEPHONE ENCOUNTER
Pt sees Dr Neal Chino    Pt is calling stating that she is unable to get an MRI of her lt knee due to the fact that [facility] lost her medical records for her  surgery so they cant identify what metals were used for the coils and stents Pt would like to know if there are any other option such as a CT or something else      # [phone]

## 2022-05-27 DIAGNOSIS — S89.92XD LEFT KNEE INJURY, SUBSEQUENT ENCOUNTER: Primary | ICD-10-CM

## 2022-05-27 DIAGNOSIS — S83.207D POSITIVE MCMURRAY TEST OF LEFT KNEE, SUBSEQUENT ENCOUNTER: ICD-10-CM

## 2022-05-27 NOTE — TELEPHONE ENCOUNTER
Pt is calling to see what the next step will be to check her lt knee since the MRI can not be done    #[phone]

## 2022-05-31 DIAGNOSIS — S83.207D MCMURRAY TEST POSITIVE, LEFT, SUBSEQUENT ENCOUNTER: Primary | ICD-10-CM

## 2022-06-01 DIAGNOSIS — S83.207D POSITIVE MCMURRAY TEST OF LEFT KNEE, SUBSEQUENT ENCOUNTER: Primary | ICD-10-CM

## 2022-06-02 ENCOUNTER — TELEPHONE (OUTPATIENT)
Dept: OBGYN CLINIC | Facility: CLINIC | Age: 37
End: 2022-06-02

## 2022-06-02 NOTE — TELEPHONE ENCOUNTER
Spoke to Praveen   She will call scheduling and have it scheduled   She is hoping to have it done in [facility]

## 2022-06-02 NOTE — TELEPHONE ENCOUNTER
[facility]  RE:  CT Scan Left knee  CB#: [phone]      Patient called to get scheduled and they gave her next available which is 7/15/22   She is wanting to know what she can do until then , she is having a constant pain with a crunching pop pain  She is looking for a call back to discuss her options

## 2022-06-03 DIAGNOSIS — S89.92XD LEFT KNEE INJURY, SUBSEQUENT ENCOUNTER: Primary | ICD-10-CM

## 2022-06-03 RX ORDER — DICLOFENAC POTASSIUM 50 MG/1
50 TABLET, FILM COATED ORAL 3 TIMES DAILY PRN
Qty: 60 TABLET | Refills: 1 | Status: SHIPPED | OUTPATIENT
Start: 2022-06-03

## 2022-06-06 ENCOUNTER — TELEPHONE (OUTPATIENT)
Dept: OBGYN CLINIC | Facility: HOSPITAL | Age: 37
End: 2022-06-06

## 2022-06-06 NOTE — TELEPHONE ENCOUNTER
Patient sees Dr Leon Stewart from [facility] is calling to see if patient has MRI scheduled  Advised it is a CT and yes 7/7  She also asked if patient had a follow up scheduled yet   Advised no

## 2022-06-06 NOTE — TELEPHONE ENCOUNTER
Spoke with patient and relayed response   She is waiting to have a CT done due to the lack of contrast

## 2022-06-16 ENCOUNTER — HOSPITAL ENCOUNTER (EMERGENCY)
Facility: HOSPITAL | Age: 37
Discharge: HOME | End: 2022-06-16
Attending: FAMILY MEDICINE
Payer: COMMERCIAL

## 2022-06-16 ENCOUNTER — APPOINTMENT (EMERGENCY)
Dept: NON INVASIVE DIAGNOSTICS | Facility: HOSPITAL | Age: 37
End: 2022-06-16
Payer: COMMERCIAL

## 2022-06-16 ENCOUNTER — APPOINTMENT (EMERGENCY)
Dept: RADIOLOGY | Facility: HOSPITAL | Age: 37
End: 2022-06-16
Payer: COMMERCIAL

## 2022-06-16 VITALS
RESPIRATION RATE: 18 BRPM | SYSTOLIC BLOOD PRESSURE: 162 MMHG | WEIGHT: 210 LBS | OXYGEN SATURATION: 100 % | DIASTOLIC BLOOD PRESSURE: 87 MMHG | BODY MASS INDEX: 31.01 KG/M2 | TEMPERATURE: 98.2 F | HEART RATE: 74 BPM

## 2022-06-16 DIAGNOSIS — M79.89 LEG SWELLING: Primary | ICD-10-CM

## 2022-06-16 LAB
ANION GAP SERPL CALCULATED.3IONS-SCNC: 10 MMOL/L (ref 4–13)
BASOPHILS # BLD AUTO: 0.03 THOUSANDS/ΜL (ref 0–0.1)
BASOPHILS NFR BLD AUTO: 0 % (ref 0–1)
BUN SERPL-MCNC: 15 MG/DL (ref 5–25)
CALCIUM SERPL-MCNC: 9.5 MG/DL (ref 8.4–10.2)
CARDIAC TROPONIN I PNL SERPL HS: <2 NG/L
CHLORIDE SERPL-SCNC: 103 MMOL/L (ref 96–108)
CO2 SERPL-SCNC: 24 MMOL/L (ref 21–32)
CREAT SERPL-MCNC: 0.66 MG/DL (ref 0.6–1.3)
D DIMER PPP FEU-MCNC: 0.39 UG/ML FEU
EOSINOPHIL # BLD AUTO: 0.18 THOUSAND/ΜL (ref 0–0.61)
EOSINOPHIL NFR BLD AUTO: 2 % (ref 0–6)
ERYTHROCYTE [DISTWIDTH] IN BLOOD BY AUTOMATED COUNT: 12.8 % (ref 11.6–15.1)
GFR SERPL CREATININE-BSD FRML MDRD: 112 ML/MIN/1.73SQ M
GLUCOSE SERPL-MCNC: 81 MG/DL (ref 65–140)
HCT VFR BLD AUTO: 41.4 % (ref 34.8–46.1)
HGB BLD-MCNC: 13.5 G/DL (ref 11.5–15.4)
IMM GRANULOCYTES # BLD AUTO: 0.02 THOUSAND/UL (ref 0–0.2)
IMM GRANULOCYTES NFR BLD AUTO: 0 % (ref 0–2)
LYMPHOCYTES # BLD AUTO: 1.75 THOUSANDS/ΜL (ref 0.6–4.47)
LYMPHOCYTES NFR BLD AUTO: 22 % (ref 14–44)
MCH RBC QN AUTO: 29.3 PG (ref 26.8–34.3)
MCHC RBC AUTO-ENTMCNC: 32.6 G/DL (ref 31.4–37.4)
MCV RBC AUTO: 90 FL (ref 82–98)
MONOCYTES # BLD AUTO: 0.62 THOUSAND/ΜL (ref 0.17–1.22)
MONOCYTES NFR BLD AUTO: 8 % (ref 4–12)
NEUTROPHILS # BLD AUTO: 5.27 THOUSANDS/ΜL (ref 1.85–7.62)
NEUTS SEG NFR BLD AUTO: 68 % (ref 43–75)
NRBC BLD AUTO-RTO: 0 /100 WBCS
PLATELET # BLD AUTO: 192 THOUSANDS/UL (ref 149–390)
PMV BLD AUTO: 9.6 FL (ref 8.9–12.7)
POTASSIUM SERPL-SCNC: 3.4 MMOL/L (ref 3.5–5.3)
RBC # BLD AUTO: 4.6 MILLION/UL (ref 3.81–5.12)
SODIUM SERPL-SCNC: 137 MMOL/L (ref 135–147)
WBC # BLD AUTO: 7.87 THOUSAND/UL (ref 4.31–10.16)

## 2022-06-16 PROCEDURE — 93971 EXTREMITY STUDY: CPT

## 2022-06-16 PROCEDURE — 99284 EMERGENCY DEPT VISIT MOD MDM: CPT

## 2022-06-16 PROCEDURE — 85379 FIBRIN DEGRADATION QUANT: CPT

## 2022-06-16 PROCEDURE — 93971 EXTREMITY STUDY: CPT | Performed by: SURGERY

## 2022-06-16 PROCEDURE — 73610 X-RAY EXAM OF ANKLE: CPT

## 2022-06-16 PROCEDURE — 99285 EMERGENCY DEPT VISIT HI MDM: CPT

## 2022-06-16 PROCEDURE — 85025 COMPLETE CBC W/AUTO DIFF WBC: CPT

## 2022-06-16 PROCEDURE — 93005 ELECTROCARDIOGRAM TRACING: CPT

## 2022-06-16 PROCEDURE — 36415 COLL VENOUS BLD VENIPUNCTURE: CPT

## 2022-06-16 PROCEDURE — 80048 BASIC METABOLIC PNL TOTAL CA: CPT

## 2022-06-16 PROCEDURE — 84484 ASSAY OF TROPONIN QUANT: CPT

## 2022-06-16 NOTE — DISCHARGE INSTRUCTIONS
Please follow-up with your orthopedic surgeon for further evaluation of her symptoms  Continue with elevating and icing the affected joint to reduce swelling  Continue taking ibuprofen and Tylenol as needed for pain control  If he developed any significant changes or worsening condition please return to the emergency department for re-evaluation

## 2022-06-16 NOTE — ED PROVIDER NOTES
History  Chief Complaint   Patient presents with    Leg Swelling     Left ankle swelling and shortness of breath x2 days  On may 8th pt injured knee  Pt has been wearing knee immobilizer since may 8th  Pt reports shooting pains inner ankle  60-year-old previously healthy female presents the emergency department with unilateral leg swelling in her left leg that has progressively worsened over the past few days  She had an injury to her left knee on May 8th of this year and was placed in a knee immobilizer with orthopedic follow-up  She was ordered to get a CT with contrast of her left knee as she is unable to get an MRI with her previous history of brain aneurysm repair with coils  She has not been able to get the CT due to the contrast shortage  She now comes in reporting left ankle swelling that started several days ago  She has tenderness to touch, but no pain at rest  She is able to move the ankle  She has been limiting the mobility of her left leg due to her injury, and she has not been moving the ankle either  She has been primarily wheelchair bound  Not on anticoagulation  She does report stiffness in the ankle joint  She does admit to some intermittent chest discomfort and SOB, but she believes this could be related to anxiety  Denies chest pain and SOB at the time of my evaluation  Denies abdominal pain, right leg pain, right leg swelling, fever, chills, headache, dizziness, hx of blood clot  Denies further injury to the left ankle or leg          History provided by:  Patient   used: No    Ankle Pain  Location:  Ankle  Ankle location:  L ankle  Pain details:     Quality:  Aching and pressure    Radiates to:  Does not radiate    Severity:  Mild    Onset quality:  Gradual    Duration:  3 days    Progression:  Unchanged  Chronicity:  New  Relieved by:  Elevation  Worsened by:  Bearing weight  Ineffective treatments:  None tried  Associated symptoms: stiffness (left ankle) and swelling (left ankle)    Associated symptoms: no back pain, no decreased ROM, no fever, no neck pain, no numbness and no tingling    Swelling:     Location:  Leg    Onset quality:  Gradual    Duration:  3 days    Timing:  Constant    Progression:  Unchanged    Chronicity:  New      Prior to Admission Medications   Prescriptions Last Dose Informant Patient Reported? Taking?   diclofenac potassium (CATAFLAM) 50 mg tablet   No No   Sig: Take 1 tablet (50 mg total) by mouth 3 (three) times a day as needed (pain)      Facility-Administered Medications: None       Past Medical History:   Diagnosis Date    Brain aneurysm        Past Surgical History:   Procedure Laterality Date    BRAIN SURGERY         History reviewed  No pertinent family history  I have reviewed and agree with the history as documented  E-Cigarette/Vaping    E-Cigarette Use Never User      E-Cigarette/Vaping Substances    Nicotine No     THC No     CBD No     Flavoring No     Other No      Social History     Tobacco Use    Smoking status: Never Smoker    Smokeless tobacco: Never Used   Vaping Use    Vaping Use: Never used   Substance Use Topics    Alcohol use: Never    Drug use: Never       Review of Systems   Constitutional: Negative for chills and fever  HENT: Negative for ear pain and sore throat  Eyes: Negative for pain and visual disturbance  Respiratory: Positive for shortness of breath (mild, infrequent)  Negative for cough  Cardiovascular: Positive for chest pain (intermittent left sided chest discomfort)  Negative for palpitations  Gastrointestinal: Negative for abdominal pain and vomiting  Genitourinary: Negative for dysuria and hematuria  Musculoskeletal: Positive for arthralgias (left leg and ankle stiffness), joint swelling (swelling at the left ankle joint) and stiffness (left ankle)  Negative for back pain and neck pain  Skin: Negative for color change and rash     Neurological: Negative for seizures and syncope  Psychiatric/Behavioral: Negative for confusion  All other systems reviewed and are negative  Physical Exam  Physical Exam  Vitals and nursing note reviewed  Constitutional:       General: She is not in acute distress  Appearance: Normal appearance  She is well-developed and normal weight  HENT:      Head: Normocephalic and atraumatic  Right Ear: External ear normal       Left Ear: External ear normal       Nose: Nose normal       Mouth/Throat:      Mouth: Mucous membranes are moist       Pharynx: Oropharynx is clear  Eyes:      General: No scleral icterus  Right eye: No discharge  Left eye: No discharge  Conjunctiva/sclera: Conjunctivae normal    Cardiovascular:      Rate and Rhythm: Normal rate and regular rhythm  Pulses: Normal pulses  Heart sounds: Normal heart sounds  No murmur heard  Pulmonary:      Effort: Pulmonary effort is normal  No respiratory distress  Breath sounds: Normal breath sounds  No wheezing or rales  Chest:      Chest wall: No tenderness  Abdominal:      General: Abdomen is flat  Palpations: Abdomen is soft  Tenderness: There is no abdominal tenderness  There is no right CVA tenderness or left CVA tenderness  Musculoskeletal:         General: Swelling (swelling localized over the medial malleolus) and tenderness (TTP over L medial malleolus) present  Cervical back: Normal range of motion and neck supple  No tenderness  Comments: Left knee immobilizer in place  Skin:     General: Skin is warm and dry  Neurological:      General: No focal deficit present  Mental Status: She is alert and oriented to person, place, and time  Mental status is at baseline  Motor: No weakness  Psychiatric:         Mood and Affect: Mood normal          Behavior: Behavior normal          Thought Content:  Thought content normal          Vital Signs  ED Triage Vitals [06/16/22 1425]   Temperature Pulse Respirations Blood Pressure SpO2   98 2 °F (36 8 °C) 74 18 162/87 100 %      Temp src Heart Rate Source Patient Position - Orthostatic VS BP Location FiO2 (%)   -- -- -- -- --      Pain Score       6           Vitals:    06/16/22 1425   BP: 162/87   Pulse: 74         Visual Acuity      ED Medications  Medications - No data to display    Diagnostic Studies  Results Reviewed     Procedure Component Value Units Date/Time    HS Troponin 0hr (reflex protocol) [[id number]]  (Normal) Collected: 06/16/22 1501    Lab Status: Final result Specimen: Blood from Arm, Right Updated: 06/16/22 1533     hs TnI 0hr <2 ng/L     Basic metabolic panel [[id number]]  (Abnormal) Collected: 06/16/22 1501    Lab Status: Final result Specimen: Blood from Arm, Right Updated: 06/16/22 1529     Sodium 137 mmol/L      Potassium 3 4 mmol/L      Chloride 103 mmol/L      CO2 24 mmol/L      ANION GAP 10 mmol/L      BUN 15 mg/dL      Creatinine 0 66 mg/dL      Glucose 81 mg/dL      Calcium 9 5 mg/dL      eGFR 112 ml/min/1 73sq m     Narrative:      [facility] guidelines for Chronic Kidney Disease (CKD):     Stage 1 with normal or high GFR (GFR > 90 mL/min/1 73 square meters)    Stage 2 Mild CKD (GFR = 60-89 mL/min/1 73 square meters)    Stage 3A Moderate CKD (GFR = 45-59 mL/min/1 73 square meters)    Stage 3B Moderate CKD (GFR = 30-44 mL/min/1 73 square meters)    Stage 4 Severe CKD (GFR = 15-29 mL/min/1 73 square meters)    Stage 5 End Stage CKD (GFR <15 mL/min/1 73 square meters)  Note: GFR calculation is accurate only with a steady state creatinine    D-Dimer [[id number]]  (Normal) Collected: 06/16/22 1501    Lab Status: Final result Specimen: Blood from Arm, Right Updated: 06/16/22 1524     D-Dimer, Quant 0 39 ug/ml FEU     CBC and differential [[id number]] Collected: 06/16/22 1501    Lab Status: Final result Specimen: Blood from Arm, Right Updated: 06/16/22 1519     WBC 7 87 Thousand/uL      RBC 4 60 Million/uL Hemoglobin 13 5 g/dL      Hematocrit 41 4 %      MCV 90 fL      MCH 29 3 pg      MCHC 32 6 g/dL      RDW 12 8 %      MPV 9 6 fL      Platelets 863 Thousands/uL      nRBC 0 /100 WBCs      Neutrophils Relative 68 %      Immat GRANS % 0 %      Lymphocytes Relative 22 %      Monocytes Relative 8 %      Eosinophils Relative 2 %      Basophils Relative 0 %      Neutrophils Absolute 5 27 Thousands/µL      Immature Grans Absolute 0 02 Thousand/uL      Lymphocytes Absolute 1 75 Thousands/µL      Monocytes Absolute 0 62 Thousand/µL      Eosinophils Absolute 0 18 Thousand/µL      Basophils Absolute 0 03 Thousands/µL                  VAS lower limb venous duplex study, unilateral/limited   Final Result by Ruby Dawson MD (06/16 1737)      XR ankle 3+ views LEFT   Final Result by Jessie Bell MD (06/16 1459)      No acute osseous abnormality  Mild medial malleolar ankle soft tissue swelling  Workstation performed: [id number]                    Procedures  ECG 12 Lead Documentation Only    Date/Time: 6/16/2022 6:36 PM  Performed by: Yashira Combs PA-C  Authorized by: Yashira Combs PA-C     Indications / Diagnosis:  79  Patient location:  ED  Interpretation:     Interpretation: normal    Rate:     ECG rate:  70    ECG rate assessment: normal    Rhythm:     Rhythm: sinus rhythm    Ectopy:     Ectopy: none    QRS:     QRS axis:  Normal    QRS intervals:  Normal  Conduction:     Conduction: normal    ST segments:     ST segments:  Normal  T waves:     T waves: flattening and inverted      Flattening:  AVL    Inverted:  V1             ED Course  ED Course as of 06/18/22 1603   Thu Jun 16, 2022   1501 XR ankle: No acute osseous abnormality  Mild medial malleolar ankle soft tissue swelling  1551 Duplex negative for DVT per tech                HEART Risk Score    Flowsheet Row Most Recent Value   Heart Score Risk Calculator    History 0 Filed at: 06/16/2022 1535   ECG 0 Filed at: 06/16/2022 80   Age 0 Filed at: 06/16/2022 1535   Risk Factors 0 Filed at: 06/16/2022 1535   Troponin 0 Filed at: 06/16/2022 1535   HEART Score 0 Filed at: 06/16/2022 1535                                      [facility]  Number of Diagnoses or Management Options  Leg swelling: new and requires workup  Diagnosis management comments: 44year old female currently being worked up for left knee injury presents to the ED with new onset left ankle swelling that began a few days ago  Vitals are stable  Patient also reports intermittent chest discomfort with SOB  DDx: DVT vs ankle sprain vs fracture, also must rule out PE  Workup includes CBC, BMP, troponin, d-dimer, duplex US, xray of left ankle, ECG  No acute osseous abnormality seen on xray  Labwork unremarkable  ECG shows no acute changes concerning for STEMI or arrhythmia that could explain intermittent chest discomfort  Duplex US negative for DVT  Workup essentially negative for acute process  Swelling likely related to patient being immobile and lack of elevation of the leg  Discussed the findings with the patient and advised her to keep elevating the left leg to reduce swelling as well as moving the ankle joint to improve blood flow  Plan for ortho follow up for continuing evaluation of left knee  Patient verbalizes understanding of the assessment and plan and is amenable to discharge  Strict return precautions discussed  Patient was discharged in stable condition          Amount and/or Complexity of Data Reviewed  Clinical lab tests: ordered and reviewed  Tests in the radiology section of CPT®: ordered and reviewed  Obtain history from someone other than the patient: yes (spouse)  Review and summarize past medical records: yes  Independent visualization of images, tracings, or specimens: yes    Patient Progress  Patient progress: stable      Disposition  Final diagnoses:   Leg swelling     Time reflects when diagnosis was documented in both MDM as applicable and the Disposition within this note     Time User Action Codes Description Comment    6/16/2022  4:59 PM Darin Smith Add [M79 89] Leg swelling       ED Disposition     ED Disposition   Discharge    Condition   Stable    Date/Time   Thu Jun 16, 2022  4:59 PM    Comment   Davide Son discharge to home/self care  Follow-up Information     Follow up With Specialties Details Why Contact Info Additional Information    Keegan Tompkins PA-C Physician Assistant Call in 1 week follow up for further evaluation of symptoms [address] [facility] [address]  [place] [facility] Emergency Medicine Go to  If symptoms worsen [address] [place] [address]  [facility] Emergency Department, [address] [place], [place], [address]    [address] Specialists [place] Orthopedic Surgery Call in 3 days follow up for further evaluation of symptoms [address] [facility] [address] [place] [address] Road [address]  [address] [facility] Specialists [place], [address], [place], [place] [address]          Discharge Medication List as of 6/16/2022  5:00 PM      CONTINUE these medications which have NOT CHANGED    Details   diclofenac potassium (CATAFLAM) 50 mg tablet Take 1 tablet (50 mg total) by mouth 3 (three) times a day as needed (pain), Starting Fri 6/3/2022, Normal             No discharge procedures on file      PDMP Review       Value Time User    PDMP Reviewed  Yes 5/8/2022  4:07 PM Marshall Agustin DO          ED Provider  Electronically Signed by           Darius Gross PA-C  06/18/22 8556

## 2022-06-17 LAB
ATRIAL RATE: 70 BPM
P AXIS: 51 DEGREES
PR INTERVAL: 192 MS
QRS AXIS: 48 DEGREES
QRSD INTERVAL: 78 MS
QT INTERVAL: 402 MS
QTC INTERVAL: 434 MS
T WAVE AXIS: 61 DEGREES
VENTRICULAR RATE: 70 BPM

## 2022-06-17 PROCEDURE — 93010 ELECTROCARDIOGRAM REPORT: CPT | Performed by: INTERNAL MEDICINE

## 2022-06-28 NOTE — TELEPHONE ENCOUNTER
Patient is having CT scan performed at [address] [facility] on 7/7/22  The NPI number is [id number]  Patient would like to get the approval done for this appt   Thank you

## 2022-07-01 NOTE — TELEPHONE ENCOUNTER
Rec'd authorization  I called [facility] and lvm with the authorization info       Auth # - [id number] valid 7/1 - 7/30

## 2022-07-07 ENCOUNTER — TELEPHONE (OUTPATIENT)
Dept: OBGYN CLINIC | Facility: HOSPITAL | Age: 37
End: 2022-07-07

## 2022-07-07 NOTE — TELEPHONE ENCOUNTER
I have already obtained authorization for this as well as called [facility] imaging and gave them the authorization on 7/1  I will take care of this

## 2022-07-07 NOTE — TELEPHONE ENCOUNTER
Manpreet Rubio from radiology is calling stating that the pt is scheduled for a lt knee CT arthrogram on 7-13 and needs authorization  For cpt code [address] [facility]#[id number]  [address] [facility][phone]  [facility]#[phone]

## 2022-07-07 NOTE — TELEPHONE ENCOUNTER
Bebe Moreno was calling back stating that she was speaking with someone earlier and believes she got disconnected  She was speaking to someone about a code for the MRI

## 2022-07-14 ENCOUNTER — TELEPHONE (OUTPATIENT)
Dept: OBGYN CLINIC | Facility: HOSPITAL | Age: 37
End: 2022-07-14

## 2022-07-20 ENCOUNTER — OFFICE VISIT (OUTPATIENT)
Dept: OBGYN CLINIC | Facility: CLINIC | Age: 37
End: 2022-07-20
Payer: COMMERCIAL

## 2022-07-20 VITALS — SYSTOLIC BLOOD PRESSURE: 173 MMHG | DIASTOLIC BLOOD PRESSURE: 113 MMHG | HEART RATE: 84 BPM

## 2022-07-20 DIAGNOSIS — M17.12 PRIMARY OSTEOARTHRITIS OF LEFT KNEE: ICD-10-CM

## 2022-07-20 DIAGNOSIS — S89.92XD LEFT KNEE INJURY, SUBSEQUENT ENCOUNTER: Primary | ICD-10-CM

## 2022-07-20 PROCEDURE — 20610 DRAIN/INJ JOINT/BURSA W/O US: CPT | Performed by: FAMILY MEDICINE

## 2022-07-20 PROCEDURE — 99214 OFFICE O/P EST MOD 30 MIN: CPT | Performed by: FAMILY MEDICINE

## 2022-07-20 RX ORDER — LIDOCAINE HYDROCHLORIDE 10 MG/ML
4 INJECTION, SOLUTION INFILTRATION; PERINEURAL
Status: COMPLETED | OUTPATIENT
Start: 2022-07-20 | End: 2022-07-20

## 2022-07-20 RX ORDER — METHYLPREDNISOLONE ACETATE 40 MG/ML
1 INJECTION, SUSPENSION INTRA-ARTICULAR; INTRALESIONAL; INTRAMUSCULAR; SOFT TISSUE
Status: COMPLETED | OUTPATIENT
Start: 2022-07-20 | End: 2022-07-20

## 2022-07-20 RX ADMIN — METHYLPREDNISOLONE ACETATE 1 ML: 40 INJECTION, SUSPENSION INTRA-ARTICULAR; INTRALESIONAL; INTRAMUSCULAR; SOFT TISSUE at 13:45

## 2022-07-20 RX ADMIN — LIDOCAINE HYDROCHLORIDE 4 ML: 10 INJECTION, SOLUTION INFILTRATION; PERINEURAL at 13:45

## 2022-07-20 NOTE — LETTER
July 20, 2022     Patient: Nataly Hardy  YOB: 1985  Date of Visit: 7/20/2022      To Whom it May Concern:    Cecily Flores is under my professional care  Chris Martinez was seen in my office on 7/20/2022  Chris Martinez may return to work with limitations - no driving  Please allow her to work from home  She must use crutches and knee brace at all times  If you have any questions or concerns, please don't hesitate to call           Sincerely,          Raquel Corona MD        CC: No Recipients

## 2022-07-20 NOTE — PROGRESS NOTES
[facility] [address]  [id number]  [address] [address] [phone]      Chief Complaint:  Chief Complaint   Patient presents with    Left Knee - Follow-up, Swelling, Numbness       Vitals:  BP (!) 173/113   Pulse 84     The following portions of the patient's history were reviewed and updated as appropriate: allergies, current medications, past family history, past medical history, past social history, past surgical history, and problem list       Subjective:   Patient ID: Salud Pineda is a 44 y o  female  Here for f/u  L knee pain/CT  Still having knee pain  Wearing knee immob  Denies CP/Calf pain/SOB  Hurts to bend  Using knee immob/NWB  Icing/elevated  Taking ibuprofen/tylenol PRn  Constant pain/dull pain  Sharp with movement  CT- L knee shows arthritic changes with fraying of meniscus  No tear  Cystic changes in patella  Review of Systems   Constitutional: Negative for fatigue and fever  Respiratory: Negative for shortness of breath  Cardiovascular: Negative for chest pain  Gastrointestinal: Negative for abdominal pain and nausea  Genitourinary: Negative for dysuria  Musculoskeletal: Positive for arthralgias, gait problem and joint swelling  Skin: Negative for rash and wound  Neurological: Negative for weakness and headaches  Objective:  Left Knee Exam     Tenderness   The patient is experiencing tenderness in the medial joint line  Range of Motion   Extension: normal   Flexion: abnormal     Other   Effusion: no effusion present          Observations   Left Knee   Negative for effusion  Physical Exam  Constitutional:       Appearance: Normal appearance  She is normal weight  Eyes:      Extraocular Movements: Extraocular movements intact  Pulmonary:      Effort: Pulmonary effort is normal    Musculoskeletal:      Cervical back: Normal range of motion  Left knee: No effusion     Skin:     General: Skin is warm and dry  Neurological:      General: No focal deficit present  Mental Status: She is alert and oriented to person, place, and time  Mental status is at baseline  Psychiatric:         Mood and Affect: Mood normal          Behavior: Behavior normal          Thought Content: Thought content normal          Judgment: Judgment normal      Large joint arthrocentesis: L knee  Universal Protocol:  Consent: Verbal consent obtained  Risks and benefits: risks, benefits and alternatives were discussed  Consent given by: patient  Time out: Immediately prior to procedure a "time out" was called to verify the correct patient, procedure, equipment, support staff and site/side marked as required  Timeout called at: 7/20/2022 1:35 PM   Site marked: the operative site was marked  Supporting Documentation  Indications: pain   Procedure Details  Location: knee - L knee  Preparation: Patient was prepped and draped in the usual sterile fashion  Needle size: 25 G  Ultrasound guidance: no  Approach: lateral  Medications administered: 4 mL lidocaine 1 %; 1 mL methylPREDNISolone acetate 40 mg/mL    Patient tolerance: patient tolerated the procedure well with no immediate complications  Dressing:  Sterile dressing applied                Assessment/Plan:  Assessment/Plan   Diagnoses and all orders for this visit:    Left knee injury, subsequent encounter  -     Ambulatory Referral to Physical Therapy; Future    Primary osteoarthritis of left knee  -     Ambulatory Referral to Physical Therapy; Future    Other orders  -     Large joint arthrocentesis: L knee        Return in about 2 weeks (around 8/3/2022) for Recheck       Susan Murguia MD

## 2022-07-20 NOTE — PATIENT INSTRUCTIONS
F/u 2 wks  Begin physical therapy  Bend knee as tolerated  L steroid injection given today  Icing/heat/OTC pain meds as needed  Weight bearing as tolerated    Crutches as needed  Knee brace

## 2022-07-26 ENCOUNTER — EVALUATION (OUTPATIENT)
Dept: PHYSICAL THERAPY | Facility: CLINIC | Age: 37
End: 2022-07-26
Payer: COMMERCIAL

## 2022-07-26 VITALS — SYSTOLIC BLOOD PRESSURE: 158 MMHG | HEART RATE: 68 BPM | DIASTOLIC BLOOD PRESSURE: 93 MMHG

## 2022-07-26 DIAGNOSIS — M25.562 ACUTE PAIN OF LEFT KNEE: Primary | ICD-10-CM

## 2022-07-26 DIAGNOSIS — M17.12 PRIMARY OSTEOARTHRITIS OF LEFT KNEE: ICD-10-CM

## 2022-07-26 DIAGNOSIS — S89.92XD LEFT KNEE INJURY, SUBSEQUENT ENCOUNTER: ICD-10-CM

## 2022-07-26 PROCEDURE — 97110 THERAPEUTIC EXERCISES: CPT | Performed by: PHYSICAL THERAPIST

## 2022-07-26 PROCEDURE — 97161 PT EVAL LOW COMPLEX 20 MIN: CPT | Performed by: PHYSICAL THERAPIST

## 2022-07-26 NOTE — PROGRESS NOTES
PT Evaluation     Today's date: 2022  Patient name: Magalie Lockett  : 1985  MRN: [id number]  Referring provider: Jermaine Rachel MD  Dx:   Encounter Diagnosis     ICD-10-CM    1  Acute pain of left knee  M25 562    2  Left knee injury, subsequent encounter  S89  92XD Ambulatory Referral to Physical Therapy   3  Primary osteoarthritis of left knee  M17 12 Ambulatory Referral to Physical Therapy                  Assessment  Assessment details: Magalie Lockett is a 44 y o  female who presents with complaints of acute pain of left knee following left knee injury in the beginning of May  Imaging reveals fraying of medial meniscus  Patient is referred to outpatient PT by ortho MD Dr Gavino Herring  No further referral appears necessary at this time based upon examination results  Patient's functional level is severely limited by her impairments listed  She will benefit from OPPT services to reduce swelling and pain, restore ROM and strength, and to maximize function, safety and QOL  Prognosis is good given HEP compliance and PT 2-3x/wk  Please contact me if you have any questions or recommendations  Thank you for the opportunity to share in  [facility]       Impairments: abnormal gait, abnormal muscle firing, abnormal or restricted ROM, abnormal movement, activity intolerance, impaired balance, impaired physical strength, lacks appropriate home exercise program, pain with function and weight-bearing intolerance  Functional limitations: unable to tolerate WB through L LE, ambulating with crutches and NWB LLE, difficulty with transfer from sit->stand, unable to drive to work, scoots up/down stairs,requires assistance of spouse for stairs, unable to participate in recreation (hiking)Understanding of Dx/Px/POC: good   Prognosis: good    Goals  STGs  1) IN 4 weeks patient will demonstrate improved L knee ROM to 90 deg or more flexion and terminal knee extension to improve functional mobility  2) In 4 weeks patient will demonstrate improved quad strength noted with fair to good quad set for purpose of gaining control of L knee during functional WB activity  3) In 4 weeks patient will tolerate ambulation with crutches and place 50% of weight through through LLE    LTGs  1) In 6-8 weeks patient will tolerate FWB through LLE, able to ambulate level surfaces without AD  2) IN 6-8 weeks patient will demonstrate independence with stair climbing using handrails but no AD  3) In 4-8 weeks patient will tolerate driving to/from work to return to her usual work schedule  4) In 8-12 weeks patient will be able to resume outdoor recreational activity including walking/hiking  Plan  Patient would benefit from: skilled physical therapy  Referral necessary: No  Planned modality interventions: cryotherapy, TENS and unattended electrical stimulation  Planned therapy interventions: joint mobilization, manual therapy, massage, Miller taping, balance, balance/weight bearing training, muscle pump exercises, neuromuscular re-education, patient education, postural training, self care, strengthening, stretching, therapeutic activities, therapeutic exercise, flexibility, functional ROM exercises, gait training and home exercise program  Frequency: 2x week  Duration in weeks: 12  Plan of Care beginning date: 7/26/2022  Plan of Care expiration date: 10/18/2022  Treatment plan discussed with: patient        Subjective Evaluation    History of Present Illness  Date of onset: 5/8/2022  Mechanism of injury: Patient presents to PT with c/o L knee pain from an injury that occurred on Mother's Day  She was walking on a trail, went to jump out of the way of a puddle and her L knee gave out to the side  She experienced severe pain and swelling immediately  She was seen in the ER  She was provided with a knee immobilizer and had x-rays  X-rays were neg for fx   Patient could not have MRI secondary to implants in brain from brain surgery for aneurysm, but was able to have CT with contrast which showed fraying of the meniscus  She has been non-WB since the time of the injury  She is using crutches  She c/o severe sharp pain on the medial aspect of her knee whenever she attempts to WB through the L LE  She has fallen 2x since being on crutches  She reports hurting her L ankle with one of the falls, but had an x-ray and this was also neg for fx  She c/o swelling in the ankle and pins and needles in the ankle at night  Had injection from Dr Ann Wolfe last week which felt a little better that day, but no longer feels much benefit from the injection  Pain  At best pain ratin  At worst pain rating: 10  Quality: dull ache and sharp  Relieving factors: ice    Social Support  Stairs in house: yes   Lives in: multiple-level home  Lives with: young children and spouse    Employment status: working ( for [employer])    Diagnostic Tests  X-ray: normal  CT scan: abnormal  Treatments  No previous or current treatments  Patient Goals  Patient goals for therapy: decreased pain, return to sport/leisure activities, return to work, improved balance, increased motion, decreased edema, increased strength and independence with ADLs/IADLs  Patient goal: return to walking, driving, return to work full time        Objective     Observations   Left Knee   Positive for atrophy and edema  Additional Observation Details  (+) atrophy L quad and L gastroc    Tenderness   Left Knee   Tenderness in the inferior patella, lateral joint line, lateral patella, medial joint line, medial patella, patellar tendon and superior patella       Active Range of Motion   Left Knee   Flexion: 30 degrees with pain  Extension: -20 degrees with pain  Left Ankle/Foot   Dorsiflexion (ke): -20 degrees with pain    Strength/Myotome Testing     Left Knee   Quadriceps contraction: poor    Tests     Additional Tests Details  Patient too guarded to perform special testing  Able to perform Supine SLR flexion independently with with c/o L hip flexor pain/pulling    Ambulation   Weight-Bearing Status   Weight-Bearing Status (Left): weight-bearing as tolerated   Assistive device used: crutches    Additional Weight-Bearing Status Details  Patient is allowed to be WBAT but is currently NWB due to the pain    Ambulation: Stairs     Additional Stairs Ambulation Details  Scoots up and down her steps inside of the home  Outside of home she receives assistance of her  to lift her             Precautions: h/o brain aneurysm and brain surgery  POC exp 10/18/22  Manuals 7/26            L knee stretching flex/ext                                                    Neuro Re-Ed             Quad sets 5" x10            Biodex weight shifting                                                                              Ther Ex             NuStep  AAROM NV            Ankle pumps x20            Seated knee flexion AAROM  10"x10            Seated heel slides with self OP             Calf stretch             HS stretch             SLR hip abduction supine w/slide board                         Ther Activity                                       Gait Training             Level surfaces             Stair training             Modalities             CP post tx prn             Estim for pain control prn                 Access Code: [email or web address]  URL: [email or web address]  Date: 07/26/2022  Prepared by: Owen Parsons    Exercises  · Seated Active Assistive Knee Flexion and Extension - 3 x daily - 7 x weekly - 10 reps - 10 sec hold  · Supine Ankle Pumps - 3 x daily - 7 x weekly - 20 reps  · Supine Quad Set - 3 x daily - 7 x weekly - 20 reps - 5 sec hold

## 2022-07-29 ENCOUNTER — OFFICE VISIT (OUTPATIENT)
Dept: PHYSICAL THERAPY | Facility: CLINIC | Age: 37
End: 2022-07-29
Payer: COMMERCIAL

## 2022-07-29 DIAGNOSIS — S89.92XD LEFT KNEE INJURY, SUBSEQUENT ENCOUNTER: Primary | ICD-10-CM

## 2022-07-29 DIAGNOSIS — M25.562 ACUTE PAIN OF LEFT KNEE: ICD-10-CM

## 2022-07-29 DIAGNOSIS — M17.12 PRIMARY OSTEOARTHRITIS OF LEFT KNEE: ICD-10-CM

## 2022-07-29 PROCEDURE — 97140 MANUAL THERAPY 1/> REGIONS: CPT | Performed by: PHYSICAL THERAPIST

## 2022-07-29 PROCEDURE — 97110 THERAPEUTIC EXERCISES: CPT | Performed by: PHYSICAL THERAPIST

## 2022-07-29 NOTE — PROGRESS NOTES
Daily Note     Today's date: 2022  Patient name: Nilay Price  : 1985  MRN: [id number]  Referring provider: Joelle Price MD  Dx:   Encounter Diagnosis     ICD-10-CM    1  Left knee injury, subsequent encounter  S89  92XD    2  Primary osteoarthritis of left knee  M17 12    3  Acute pain of left knee  M25 562                   Subjective: Patient reports she has been doing her home exercises  She thinks they are helping to decrease the swelling  Objective: See treatment diary below      Assessment: Tolerated treatment fair  Adjusted crutch height this date and patient demonstrated improved posture and improved control with ambulation after adjustment  Less sensitive to palpation today  Patient demonstrated fatigue post treatment, exhibited good technique with therapeutic exercises and would benefit from continued PT      Plan: Continue per plan of care  Progress treatment as tolerated         Precautions: h/o brain aneurysm and brain surgery  POC exp 10/18/22  Manuals       L knee stretching flex/ext  KG                              Neuro Re-Ed        Quad sets 5" x10       Biodex weight shifting                                                Ther Ex        NuStep  AAROM NV AAROM  L1 x10 mins      Ankle pumps x20 TB PF/DF  Red TB  x20 ea        Seated knee flexion AAROM  10"x10 10"x10      Seated heel slides with self OP  attempted      Calf stretch        HS stretch  Supine w/strap  30"x3      SLR hip abduction supine w/slide board 2x10              Ther Activity                        Gait Training        Level surfaces        Stair training        Modalities        CP post tx prn        Estim for pain control prn

## 2022-08-01 ENCOUNTER — OFFICE VISIT (OUTPATIENT)
Dept: PHYSICAL THERAPY | Facility: CLINIC | Age: 37
End: 2022-08-01
Payer: COMMERCIAL

## 2022-08-01 DIAGNOSIS — S89.92XD LEFT KNEE INJURY, SUBSEQUENT ENCOUNTER: Primary | ICD-10-CM

## 2022-08-01 DIAGNOSIS — M17.12 PRIMARY OSTEOARTHRITIS OF LEFT KNEE: ICD-10-CM

## 2022-08-01 DIAGNOSIS — M25.562 ACUTE PAIN OF LEFT KNEE: ICD-10-CM

## 2022-08-01 PROCEDURE — 97110 THERAPEUTIC EXERCISES: CPT

## 2022-08-01 PROCEDURE — 97140 MANUAL THERAPY 1/> REGIONS: CPT

## 2022-08-01 NOTE — PROGRESS NOTES
Daily Note     Today's date: 2022  Patient name: Isaiah Montenegro  : 1985  MRN: [id number]  Referring provider: Melquiades Dhillon MD  Dx:   Encounter Diagnosis     ICD-10-CM    1  Left knee injury, subsequent encounter  S89  92XD    2  Primary osteoarthritis of left knee  M17 12    3  Acute pain of left knee  M25 562                   Subjective: Pt reported that she continues to have a lot of pain when trying to WB on the LLE and also when bending the L knee  "It feels like a bee is stinging me in my knee when I bend it "       Objective: See treatment diary below      Assessment: Tolerated treatment fair  She continues to be very limited in L knee flexion during manuals  Patient demonstrated fatigue post treatment and would benefit from continued PT  Advised pt to add heel slides and a calf stretch to her HEP to increase ROM of the LLE  She verbally agrees  Plan: Continue per plan of care  Progress treatment as tolerated         Precautions: h/o brain aneurysm and brain surgery  POC exp 10/18/22  Manuals      L knee stretching flex/ext  KG TM                             Neuro Re-Ed        Quad sets 5" x10  5" x20     Biodex weight shifting                                                Ther Ex        NuStep  AAROM NV AAROM  L1 x10 mins AAROM  L1 x10 mins     Ankle pumps x20 TB PF/DF  Red TB  x20 ea   TB PF/DF  Red TB  x20 ea     Seated knee flexion AAROM  10"x10 10"x10 10"x10     Seated heel slides with self OP  attempted Supine w/ strap 5' x20     Calf stretch   Supine w/strap  30"x3     HS stretch  Supine w/strap  30"x3 Supine w/strap  30"x3     SLR hip abduction supine w/slide board 2x10 2x10 w/ sliding board             Ther Activity                        Gait Training        Level surfaces        Stair training        Modalities        CP post tx prn        Estim for pain control prn

## 2022-08-04 ENCOUNTER — OFFICE VISIT (OUTPATIENT)
Dept: PHYSICAL THERAPY | Facility: CLINIC | Age: 37
End: 2022-08-04
Payer: COMMERCIAL

## 2022-08-04 DIAGNOSIS — M25.562 ACUTE PAIN OF LEFT KNEE: ICD-10-CM

## 2022-08-04 DIAGNOSIS — S89.92XD LEFT KNEE INJURY, SUBSEQUENT ENCOUNTER: Primary | ICD-10-CM

## 2022-08-04 DIAGNOSIS — M17.12 PRIMARY OSTEOARTHRITIS OF LEFT KNEE: ICD-10-CM

## 2022-08-04 PROCEDURE — 97110 THERAPEUTIC EXERCISES: CPT

## 2022-08-04 PROCEDURE — 97116 GAIT TRAINING THERAPY: CPT

## 2022-08-04 PROCEDURE — 97140 MANUAL THERAPY 1/> REGIONS: CPT

## 2022-08-04 PROCEDURE — 97112 NEUROMUSCULAR REEDUCATION: CPT

## 2022-08-04 NOTE — PROGRESS NOTES
Daily Note     Today's date: 2022  Patient name: Isaiah Montenegro  : 1985  MRN: [id number]  Referring provider: Melquiades Dhillon MD  Dx:   Encounter Diagnosis     ICD-10-CM    1  Left knee injury, subsequent encounter  S89  92XD    2  Primary osteoarthritis of left knee  M17 12    3  Acute pain of left knee  M25 562                   Subjective: Pt reported that she feel there is fluid in her knee  She continues to have a "stabbing pain" in her L knee winston trying to WB  Objective: See treatment diary below      Assessment: Tolerated treatment fair  Focused on gait training, increase WB onto the LLE, and increase ROM of the L knee  Patient demonstrated fatigue post treatment, exhibited good technique with therapeutic exercises and would benefit from continued PT      Plan: Continue per plan of care  Progress treatment as tolerated         Precautions: h/o brain aneurysm and brain surgery  POC exp 10/18/22  Manuals     L knee stretching flex/ext  KG TM TM                            Neuro Re-Ed        Quad sets 5" x10  5" x20 5" x20    Biodex weight shifting    To tolerance (35% WB) 10' on/off x5 min total                                            Ther Ex        NuStep  AAROM NV AAROM  L1 x10 mins AAROM  L1 x10 mins AAROM  L1 x10 mins    Ankle pumps x20 TB PF/DF  Red TB  x20 ea   TB PF/DF  Red TB  x20 ea     Seated knee flexion AAROM  10"x10 10"x10 10"x10 10"x10    Seated heel slides with self OP  attempted Supine w/ strap 5' x20 Supine w/ strap 5' x20    Calf stretch   Supine w/strap  30"x3 Supine w/strap  30"x3    HS stretch  Supine w/strap  30"x3 Supine w/strap  30"x3     SLR hip abduction supine w/slide board 2x10 2x10 w/ sliding board             Ther Activity                        Gait Training        Level surfaces    WBAT w/ B/L axillary crutches x120 ft    Stair training    4 steps up/down with cues and CGA    Modalities        CP post tx prn        Estim for pain control prn

## 2022-08-05 ENCOUNTER — OFFICE VISIT (OUTPATIENT)
Dept: OBGYN CLINIC | Facility: CLINIC | Age: 37
End: 2022-08-05
Payer: COMMERCIAL

## 2022-08-05 VITALS
HEIGHT: 69 IN | DIASTOLIC BLOOD PRESSURE: 100 MMHG | BODY MASS INDEX: 30.21 KG/M2 | SYSTOLIC BLOOD PRESSURE: 141 MMHG | WEIGHT: 204 LBS | HEART RATE: 81 BPM

## 2022-08-05 DIAGNOSIS — S89.92XD LEFT KNEE INJURY, SUBSEQUENT ENCOUNTER: Primary | ICD-10-CM

## 2022-08-05 DIAGNOSIS — S83.207D POSITIVE MCMURRAY TEST OF LEFT KNEE, SUBSEQUENT ENCOUNTER: ICD-10-CM

## 2022-08-05 DIAGNOSIS — M17.12 PRIMARY OSTEOARTHRITIS OF LEFT KNEE: ICD-10-CM

## 2022-08-05 PROCEDURE — 99214 OFFICE O/P EST MOD 30 MIN: CPT | Performed by: FAMILY MEDICINE

## 2022-08-05 NOTE — PROGRESS NOTES
[facility] [place]  [address]  [facility] [id number] [phone]      Chief Complaint:  Chief Complaint   Patient presents with    Left Knee - Follow-up       Vitals:  /100   Pulse 81   Ht 5' 9" (1 753 m)   Wt 92 5 kg (204 lb)   BMI 30 13 kg/m²     The following portions of the patient's history were reviewed and updated as appropriate: allergies, current medications, past family history, past medical history, past social history, past surgical history, and problem list       Subjective:   Patient ID: Jacob Arndt is a 44 y o  female  Here for f/u  L knee pain  Still having knee pain  Wearing knee brace  Using crutches- hurts tow alk  Denies CP/Calf pain/SOB  Hurts to bend  Going to PT  Icing  Taking ibuprofen/tylenol PRN  Constant pain/dull pain  Sharp with movement weight bearing       CT- L knee shows arthritic changes with fraying of meniscus  No tear  Cystic changes in patella          Review of Systems   Constitutional: Negative for fatigue and fever  Respiratory: Negative for shortness of breath  Cardiovascular: Negative for chest pain  Gastrointestinal: Negative for abdominal pain and nausea  Genitourinary: Negative for dysuria  Musculoskeletal: Positive for arthralgias, gait problem and joint swelling  Skin: Negative for rash and wound  Neurological: Negative for weakness and headaches  Objective:  Left Knee Exam     Tenderness   The patient is experiencing tenderness in the medial joint line  Range of Motion   Extension: normal   Flexion:  10 abnormal     Other   Swelling: none  Effusion: no effusion present          Observations   Left Knee   Negative for effusion  Physical Exam  Constitutional:       Appearance: Normal appearance  She is normal weight  HENT:      Head: Normocephalic  Eyes:      Extraocular Movements: Extraocular movements intact     Pulmonary:      Effort: Pulmonary effort is normal    Musculoskeletal:         General: Tenderness present  Cervical back: Normal range of motion  Left knee: No effusion  Skin:     General: Skin is warm and dry  Neurological:      General: No focal deficit present  Mental Status: She is alert and oriented to person, place, and time  Mental status is at baseline  Psychiatric:         Mood and Affect: Mood normal          Behavior: Behavior normal          Thought Content: Thought content normal          Judgment: Judgment normal                Assessment/Plan:  Assessment/Plan   Diagnoses and all orders for this visit:    Left knee injury, subsequent encounter  -     Ambulatory Referral to Orthopedic Surgery; Future    Primary osteoarthritis of left knee  -     Ambulatory Referral to Orthopedic Surgery; Future    Positive [place] test of left knee, subsequent encounter  -     Ambulatory Referral to Orthopedic Surgery; Future        Return for f/u with Dr Carlos Roberts, [facility] Leonard Go MD

## 2022-08-05 NOTE — PATIENT INSTRUCTIONS
F/u here as needed  Continue therapy- increasing flexion  Continue home exercises  Weight bearing as tolerated/crutches as needed  Icing/heat/OTC pain meds as needed  Referral to Dr Guy Li- dec flexion with PT- possible bucket handle tear/MRI neg for meniscal tear?

## 2022-08-05 NOTE — LETTER
August 5, 2022     Patient: Leopold Penman  YOB: 1985  Date of Visit: 8/5/2022      To Whom it May Concern:    Sapphire Roche is under my professional care  Lawrence Ortiz was seen in my office on 8/5/2022  Lawrence Ortiz is excused from work due to the medical condition I am treating her for from 8/5/22-9/20/22  If you have any questions or concerns, please don't hesitate to call           Sincerely,          Casi Díaz MD        CC: No Recipients

## 2022-08-09 ENCOUNTER — OFFICE VISIT (OUTPATIENT)
Dept: PHYSICAL THERAPY | Facility: CLINIC | Age: 37
End: 2022-08-09
Payer: COMMERCIAL

## 2022-08-09 DIAGNOSIS — S89.92XD LEFT KNEE INJURY, SUBSEQUENT ENCOUNTER: Primary | ICD-10-CM

## 2022-08-09 DIAGNOSIS — M17.12 PRIMARY OSTEOARTHRITIS OF LEFT KNEE: ICD-10-CM

## 2022-08-09 DIAGNOSIS — M25.562 ACUTE PAIN OF LEFT KNEE: ICD-10-CM

## 2022-08-09 PROCEDURE — 97110 THERAPEUTIC EXERCISES: CPT

## 2022-08-09 PROCEDURE — 97116 GAIT TRAINING THERAPY: CPT

## 2022-08-09 PROCEDURE — 97112 NEUROMUSCULAR REEDUCATION: CPT

## 2022-08-09 PROCEDURE — 97140 MANUAL THERAPY 1/> REGIONS: CPT

## 2022-08-09 NOTE — PROGRESS NOTES
Daily Note     Today's date: 2022  Patient name: Adele Macdonald  : 1985  MRN: [id number]  Referring provider: Edin Bran MD  Dx:   Encounter Diagnosis     ICD-10-CM    1  Left knee injury, subsequent encounter  S89  92XD    2  Primary osteoarthritis of left knee  M17 12    3  Acute pain of left knee  M25 562                   Subjective: Pt reported that she had a f/u with her ortho on Friday  "He reviewed my CAT scan and believes that there is tearing " She is now scheduled for a knee scope on 22  Objective: See treatment diary below      Assessment: Tolerated treatment fair  She did demonstrate a better gait pattern when amb w/ b/l axillary crutches  She also was able to achieve 50% WB on the LLE  She remains very limited in L knee flexion  Patient demonstrated fatigue post treatment, exhibited good technique with therapeutic exercises and would benefit from continued PT      Plan: Continue per plan of care  Progress treatment as tolerated         Precautions: h/o brain aneurysm and brain surgery  POC exp 10/18/22  Manuals  8   L knee stretching flex/ext  KG TM TM TM                           Neuro Re-Ed        Quad sets 5" x10  5" x20 5" x20 5" x20   Biodex weight shifting    To tolerance (35% WB) 10' on/off x5 min total To tolerance (50% WB) 10' on/off x5 min total                                           Ther Ex        NuStep  AAROM NV AAROM  L1 x10 mins AAROM  L1 x10 mins AAROM  L1 x10 mins AAROM  L1 x10 mins   Ankle pumps x20 TB PF/DF  Red TB  x20 ea   TB PF/DF  Red TB  x20 ea     Seated knee flexion AAROM  10"x10 10"x10 10"x10 10"x10 10"x10   Seated heel slides with self OP  attempted Supine w/ strap 5' x20 Supine w/ strap 5' x20 Supine w/ strap 5' x20   Calf stretch   Supine w/strap  30"x3 Supine w/strap  30"x3 Supine w/strap  30"x3   HS stretch  Supine w/strap  30"x3 Supine w/strap  30"x3     SLR hip abduction supine w/slide board 2x10 2x10 w/ sliding board Ther Activity                        Gait Training        Level surfaces    WBAT w/ B/L axillary crutches x120 ft WBAT w/ B/L axillary crutches x200 ft   Stair training    4 steps up/down with cues and CGA    Modalities        CP post tx prn        Estim for pain control prn

## 2022-08-11 ENCOUNTER — OFFICE VISIT (OUTPATIENT)
Dept: PHYSICAL THERAPY | Facility: CLINIC | Age: 37
End: 2022-08-11
Payer: COMMERCIAL

## 2022-08-11 DIAGNOSIS — M25.562 ACUTE PAIN OF LEFT KNEE: ICD-10-CM

## 2022-08-11 DIAGNOSIS — M17.12 PRIMARY OSTEOARTHRITIS OF LEFT KNEE: ICD-10-CM

## 2022-08-11 DIAGNOSIS — S89.92XD LEFT KNEE INJURY, SUBSEQUENT ENCOUNTER: Primary | ICD-10-CM

## 2022-08-11 PROCEDURE — 97140 MANUAL THERAPY 1/> REGIONS: CPT

## 2022-08-11 PROCEDURE — 97112 NEUROMUSCULAR REEDUCATION: CPT

## 2022-08-11 PROCEDURE — 97110 THERAPEUTIC EXERCISES: CPT

## 2022-08-11 PROCEDURE — 97116 GAIT TRAINING THERAPY: CPT

## 2022-08-11 NOTE — PROGRESS NOTES
Daily Note     Today's date: 2022  Patient name: Ten Zarate  : 1985  MRN: [id number]  Referring provider: Roxanna Ann MD  Dx:   Encounter Diagnosis     ICD-10-CM    1  Left knee injury, subsequent encounter  S89  92XD    2  Primary osteoarthritis of left knee  M17 12    3  Acute pain of left knee  M25 562                   Subjective: Pt offered no new comments  She has been compliant with her HEP she continues to have pain when WB on the LLE      Objective: See treatment diary below      Assessment: Tolerated treatment well  Patient demonstrated fatigue post treatment, exhibited good technique with therapeutic exercises and would benefit from continued PT      Plan: Continue per plan of care  Progress treatment as tolerated         Precautions: h/o brain aneurysm and brain surgery  POC exp 10/18/22  Manuals    L knee stretching flex/ext TM KG TM TM TM                           Neuro Re-Ed        Quad sets 5" x20  5" x20 5" x20 5" x20   Biodex weight shifting To tolerance (50% WB) 10' on/off x5 min total   To tolerance (35% WB) 10' on/off x5 min total To tolerance (50% WB) 10' on/off x5 min total                                           Ther Ex        NuStep  AAROM  L1 x10 mins AAROM  L1 x10 mins AAROM  L1 x10 mins AAROM  L1 x10 mins AAROM  L1 x10 mins   Ankle pumps  TB PF/DF  Red TB  x20 ea   TB PF/DF  Red TB  x20 ea     Seated knee flexion AAROM  10"x10 10"x10 10"x10 10"x10 10"x10   Seated heel slides with self OP Supine w/ strap 5' x20 attempted Supine w/ strap 5' x20 Supine w/ strap 5' x20 Supine w/ strap 5' x20   Calf stretch Supine w/strap  30"x3  Supine w/strap  30"x3 Supine w/strap  30"x3 Supine w/strap  30"x3   HS stretch Supine w/strap  30"x3 Supine w/strap  30"x3 Supine w/strap  30"x3     SLR hip abduction sidelying 2x10 2x10 2x10 w/ sliding board     SLR in Flex 2x10       Ther Activity                        Gait Training        Level surfaces WBAT w/ B/L axillary crutches x200 ft   WBAT w/ B/L axillary crutches x120 ft WBAT w/ B/L axillary crutches x200 ft   Stair training    4 steps up/down with cues and CGA    Modalities        CP post tx prn        Estim for pain control prn

## 2022-08-16 ENCOUNTER — OFFICE VISIT (OUTPATIENT)
Dept: PHYSICAL THERAPY | Facility: CLINIC | Age: 37
End: 2022-08-16
Payer: COMMERCIAL

## 2022-08-16 DIAGNOSIS — S89.92XD LEFT KNEE INJURY, SUBSEQUENT ENCOUNTER: Primary | ICD-10-CM

## 2022-08-16 DIAGNOSIS — M17.12 PRIMARY OSTEOARTHRITIS OF LEFT KNEE: ICD-10-CM

## 2022-08-16 DIAGNOSIS — M25.562 ACUTE PAIN OF LEFT KNEE: ICD-10-CM

## 2022-08-16 PROCEDURE — 97110 THERAPEUTIC EXERCISES: CPT

## 2022-08-16 PROCEDURE — 97140 MANUAL THERAPY 1/> REGIONS: CPT

## 2022-08-16 PROCEDURE — 97112 NEUROMUSCULAR REEDUCATION: CPT

## 2022-08-16 PROCEDURE — 97116 GAIT TRAINING THERAPY: CPT

## 2022-08-16 NOTE — PROGRESS NOTES
Daily Note     Today's date: 2022  Patient name: Ramona Li  : 1985  MRN: [id number]  Referring provider: Moisés Cole MD  Dx:   Encounter Diagnosis     ICD-10-CM    1  Left knee injury, subsequent encounter  S89  92XD    2  Primary osteoarthritis of left knee  M17 12    3  Acute pain of left knee  M25 562                   Subjective: Pt reported that she did trip over her crutches yesterday  She bumped her L knee  It is sore today  She is anxious to have her knee scope performed in Sept        Objective: See treatment diary below      Assessment: Tolerated treatment fair  She continues to remains very limited in L knee flexion and has increased pain when WB of the LLE  Patient demonstrated fatigue post treatment, exhibited good technique with therapeutic exercises and would benefit from continued PT      Plan: Continue per plan of care  Progress treatment as tolerated         Precautions: h/o brain aneurysm and brain surgery  POC exp 10/18/22  Manuals    L knee stretching flex/ext TM TM TM TM TM                           Neuro Re-Ed        Quad sets 5" x20 5" x20 5" x20 5" x20 5" x20   Biodex weight shifting To tolerance (50% WB) 10' on/off x5 min total To tolerance (55% WB) 10' on/off x5 min total  To tolerance (35% WB) 10' on/off x5 min total To tolerance (50% WB) 10' on/off x5 min total                                           Ther Ex        NuStep  AAROM  L1 x10 mins AAROM  L2 x10 mins AAROM  L1 x10 mins AAROM  L1 x10 mins AAROM  L1 x10 mins   Ankle pumps   TB PF/DF  Red TB  x20 ea     Seated knee flexion AAROM  10"x10 10"x10 10"x10 10"x10 10"x10   Seated heel slides with self OP Supine w/ strap 5' x20 Supine w/ strap 5' x20 Supine w/ strap 5' x20 Supine w/ strap 5' x20 Supine w/ strap 5' x20   Calf stretch Supine w/strap  30"x3 Supine w/strap  30"x3 Supine w/strap  30"x3 Supine w/strap  30"x3 Supine w/strap  30"x3   HS stretch Supine w/strap  30"x3 Supine w/strap  30"x3 Supine w/strap  30"x3     SLR hip abduction sidelying 2x10 sidelying 2x10 2x10 w/ sliding board     SLR in Flex 2x10 2x10      Ther Activity                        Gait Training        Level surfaces WBAT w/ B/L axillary crutches x200 ft WBAT w/ B/L axillary crutches x200 ft      WBAT w/ B/L axillary crutches x120 ft WBAT w/ B/L axillary crutches x200 ft   Stair training  Single axillary crutch in Pbars x5 passes with CGA  4 steps up/down with cues and CGA    Modalities        CP post tx prn        Estim for pain control prn

## 2022-08-18 ENCOUNTER — APPOINTMENT (OUTPATIENT)
Dept: PHYSICAL THERAPY | Facility: CLINIC | Age: 37
End: 2022-08-18
Payer: COMMERCIAL

## 2022-08-23 ENCOUNTER — OFFICE VISIT (OUTPATIENT)
Dept: PHYSICAL THERAPY | Facility: CLINIC | Age: 37
End: 2022-08-23
Payer: COMMERCIAL

## 2022-08-23 DIAGNOSIS — M25.562 ACUTE PAIN OF LEFT KNEE: ICD-10-CM

## 2022-08-23 DIAGNOSIS — M17.12 PRIMARY OSTEOARTHRITIS OF LEFT KNEE: ICD-10-CM

## 2022-08-23 DIAGNOSIS — S89.92XD LEFT KNEE INJURY, SUBSEQUENT ENCOUNTER: Primary | ICD-10-CM

## 2022-08-23 PROCEDURE — 97116 GAIT TRAINING THERAPY: CPT

## 2022-08-23 PROCEDURE — 97140 MANUAL THERAPY 1/> REGIONS: CPT

## 2022-08-23 PROCEDURE — 97112 NEUROMUSCULAR REEDUCATION: CPT

## 2022-08-23 PROCEDURE — 97110 THERAPEUTIC EXERCISES: CPT

## 2022-08-23 NOTE — PROGRESS NOTES
Daily Note     Today's date: 2022  Patient name: Ramona Li  : 1985  MRN: [id number]  Referring provider: Moisés Cole MD  Dx:   Encounter Diagnosis     ICD-10-CM    1  Left knee injury, subsequent encounter  S89  92XD    2  Primary osteoarthritis of left knee  M17 12    3  Acute pain of left knee  M25 562        Start Time: 0930          Subjective: Pt reports her L knee is stiff and sore today  Awaiting knee scope 22  Objective: See treatment diary below      Assessment: Tolerated treatment well  Pt continues with very limited L knee flexion  Ambulates with axillary crutches WBAT on L LE  Patient demonstrated fatigue post treatment, exhibited good technique with therapeutic exercises and would benefit from continued PT      Plan: Continue per plan of care        Precautions: h/o brain aneurysm and brain surgery  POC exp 10/18/22  Manuals    L knee stretching flex/ext TM TM MJC TM TM                           Neuro Re-Ed        Quad sets 5" x20 5" x20 5" x20 5" x20 5" x20   Biodex weight shifting To tolerance (50% WB) 10' on/off x5 min total To tolerance (55% WB) 10' on/off x5 min total To tolerance  ( 55% WB)  10" on/off x 6 min total  To tolerance (35% WB) 10' on/off x5 min total To tolerance (50% WB) 10' on/off x5 min total                                           Ther Ex        NuStep  AAROM  L1 x10 mins AAROM  L2 x10 mins AAROM  L1 x10 mins AAROM  L1 x10 mins AAROM  L1 x10 mins   Ankle pumps        Seated knee flexion AAROM  10"x10 10"x10 10"x10 10"x10 10"x10   Seated heel slides with self OP Supine w/ strap 5' x20 Supine w/ strap 5' x20 Supine w/ strap 5' x20 Supine w/ strap 5' x20 Supine w/ strap 5' x20   Calf stretch Supine w/strap  30"x3 Supine w/strap  30"x3 Supine w/strap  30"x3 Supine w/strap  30"x3 Supine w/strap  30"x3   HS stretch Supine w/strap  30"x3 Supine w/strap  30"x3 Supine w/strap  30"x3     SLR hip abduction sidelying 2x10 sidelying 2x10 sidelying  2x10      SLR in Flex 2x10 2x10 2x10     Ther Activity                        Gait Training        Level surfaces WBAT w/ B/L axillary crutches x200 ft WBAT w/ B/L axillary crutches x200 ft     WBAT w/ B/L axillary crutches x200 ft WBAT w/ B/L axillary crutches x120 ft WBAT w/ B/L axillary crutches x200 ft   Stair training  Single axillary crutch in Pbars x5 passes with CGA  4 steps up/down with cues and CGA    Modalities        CP post tx prn        Estim for pain control prn

## 2022-08-25 ENCOUNTER — OFFICE VISIT (OUTPATIENT)
Dept: PHYSICAL THERAPY | Facility: CLINIC | Age: 37
End: 2022-08-25
Payer: COMMERCIAL

## 2022-08-25 DIAGNOSIS — M17.12 PRIMARY OSTEOARTHRITIS OF LEFT KNEE: ICD-10-CM

## 2022-08-25 DIAGNOSIS — M25.562 ACUTE PAIN OF LEFT KNEE: ICD-10-CM

## 2022-08-25 DIAGNOSIS — S89.92XD LEFT KNEE INJURY, SUBSEQUENT ENCOUNTER: Primary | ICD-10-CM

## 2022-08-25 PROCEDURE — 97110 THERAPEUTIC EXERCISES: CPT

## 2022-08-25 PROCEDURE — 97140 MANUAL THERAPY 1/> REGIONS: CPT

## 2022-08-25 PROCEDURE — 97112 NEUROMUSCULAR REEDUCATION: CPT

## 2022-08-25 PROCEDURE — 97116 GAIT TRAINING THERAPY: CPT

## 2022-08-25 NOTE — PROGRESS NOTES
Daily Note     Today's date: 2022  Patient name: Burgess Goodman  : 1985  MRN: [id number]  Referring provider: Autumn Mckinney MD  Dx:   Encounter Diagnosis     ICD-10-CM    1  Left knee injury, subsequent encounter  S89  92XD    2  Primary osteoarthritis of left knee  M17 12    3  Acute pain of left knee  M25 562                   Subjective: Pt c/o L knee instability, feels like knee cap moves to the inside with certain movements  Awaiting appointment with Dr Karli Levy 22  Objective: See treatment diary below      Assessment: Tolerated treatment well  Pt remains very limited with L knee flexion  C/o medial patellar pain with flexion stretch  Continues to ambulate with axillary crutches  Encouraged patient to continue with L knee flexion stretches at home  Patient demonstrated fatigue post treatment, exhibited good technique with therapeutic exercises and would benefit from continued PT      Plan: Continue per plan of care        Precautions: h/o brain aneurysm and brain surgery  POC exp 10/18/22  Manuals  8   L knee stretching flex/ext TM TM MJC MJC TM                           Neuro Re-Ed        Quad sets 5" x20 5" x20 5" x20 5" x20 5" x20   Biodex weight shifting To tolerance (50% WB) 10' on/off x5 min total To tolerance (55% WB) 10' on/off x5 min total To tolerance  ( 55% WB)  10" on/off x 6 min total  To tolerance (57% WB) 10' on/off x6 min total To tolerance (50% WB) 10' on/off x5 min total                                           Ther Ex        NuStep  AAROM  L1 x10 mins AAROM  L2 x10 mins AAROM  L1 x10 mins AAROM  L1 x10 mins AAROM  L1 x10 mins   Ankle pumps        Seated knee flexion AAROM  10"x10 10"x10 10"x10 10"x10 10"x10   Seated heel slides with self OP Supine w/ strap 5' x20 Supine w/ strap 5' x20 Supine w/ strap 5' x20 Supine w/ strap 5' x20 Supine w/ strap 5' x20   Calf stretch Supine w/strap  30"x3 Supine w/strap  30"x3 Supine w/strap  30"x3 Supine w/strap  30"x3 Supine w/strap  30"x3   HS stretch Supine w/strap  30"x3 Supine w/strap  30"x3 Supine w/strap  30"x3 Supine w/strap  30"x3    SLR hip abduction sidelying 2x10 sidelying 2x10 sidelying  2x10  sidelying  2x10    SLR in Flex 2x10 2x10 2x10 2x10    Ther Activity                        Gait Training        Level surfaces WBAT w/ B/L axillary crutches x200 ft WBAT w/ B/L axillary crutches x200 ft     WBAT w/ B/L axillary crutches x200 ft WBAT w/ B/L axillary crutches x120 ft WBAT w/ B/L axillary crutches x200 ft   Stair training  Single axillary crutch in Pbars x5 passes with CGA      Modalities        CP post tx prn        Estim for pain control prn

## 2022-08-30 ENCOUNTER — OFFICE VISIT (OUTPATIENT)
Dept: PHYSICAL THERAPY | Facility: CLINIC | Age: 37
End: 2022-08-30
Payer: COMMERCIAL

## 2022-08-30 DIAGNOSIS — M17.12 PRIMARY OSTEOARTHRITIS OF LEFT KNEE: ICD-10-CM

## 2022-08-30 DIAGNOSIS — S89.92XD LEFT KNEE INJURY, SUBSEQUENT ENCOUNTER: Primary | ICD-10-CM

## 2022-08-30 DIAGNOSIS — M25.562 ACUTE PAIN OF LEFT KNEE: ICD-10-CM

## 2022-08-30 PROCEDURE — 97112 NEUROMUSCULAR REEDUCATION: CPT

## 2022-08-30 PROCEDURE — 97110 THERAPEUTIC EXERCISES: CPT

## 2022-08-30 PROCEDURE — 97140 MANUAL THERAPY 1/> REGIONS: CPT

## 2022-08-30 NOTE — PROGRESS NOTES
Daily Note     Today's date: 2022  Patient name: Aditya Mars  : 1985  MRN: [id number]  Referring provider: Raffi Winter MD  Dx:   Encounter Diagnosis     ICD-10-CM    1  Left knee injury, subsequent encounter  S89  92XD    2  Primary osteoarthritis of left knee  M17 12    3  Acute pain of left knee  M25 562                   Subjective: Pt is anxious to have her L knee scoped on   She has been attempting to put more weight through her LLE and is sore from doing so  Objective: See treatment diary below      Assessment: Tolerated treatment fair  She continues to be very limited in L knee ROM  Patient demonstrated fatigue post treatment, exhibited good technique with therapeutic exercises and would benefit from continued PT      Plan: Continue per plan of care  Progress treatment as tolerated         Precautions: h/o brain aneurysm and brain surgery  POC exp 10/18/22  Manuals    L knee stretching flex/ext TM TM MJC MJC TM                           Neuro Re-Ed        Quad sets 5" x20 5" x20 5" x20 5" x20 5" x20   Biodex weight shifting To tolerance (50% WB) 10' on/off x5 min total To tolerance (55% WB) 10' on/off x5 min total To tolerance  ( 55% WB)  10" on/off x 6 min total  To tolerance (57% WB) 10' on/off x6 min total To tolerance (60% WB) 10' on/off x5 min total                                           Ther Ex        NuStep  AAROM  L1 x10 mins AAROM  L2 x10 mins AAROM  L1 x10 mins AAROM  L1 x10 mins AAROM  L3 x10 mins   Ankle pumps        Seated knee flexion AAROM  10"x10 10"x10 10"x10 10"x10 10"x10   Seated heel slides with self OP Supine w/ strap 5' x20 Supine w/ strap 5' x20 Supine w/ strap 5' x20 Supine w/ strap 5' x20 Supine w/ strap 5' x20   Calf stretch Supine w/strap  30"x3 Supine w/strap  30"x3 Supine w/strap  30"x3 Supine w/strap  30"x3 Supine w/strap  30"x3   HS stretch Supine w/strap  30"x3 Supine w/strap  30"x3 Supine w/strap  30"x3 Supine w/strap  30"x3 Supine w/strap  30"x3   SLR hip abduction sidelying 2x10 sidelying 2x10 sidelying  2x10  sidelying  2x10 2# 3x10   SLR in Flex 2x10 2x10 2x10 2x10 2# 3x10   Standing HR/TR     x30 ea   Lunges onto step     10" x10 onto step "C"                    Ther Activity                        Gait Training        Level surfaces WBAT w/ B/L axillary crutches x200 ft WBAT w/ B/L axillary crutches x200 ft     WBAT w/ B/L axillary crutches x200 ft WBAT w/ B/L axillary crutches x120 ft    Stair training  Single axillary crutch in Pbars x5 passes with CGA      Modalities        CP post tx prn        Estim for pain control prn

## 2022-09-01 ENCOUNTER — EVALUATION (OUTPATIENT)
Dept: PHYSICAL THERAPY | Facility: CLINIC | Age: 37
End: 2022-09-01
Payer: COMMERCIAL

## 2022-09-01 DIAGNOSIS — M17.12 PRIMARY OSTEOARTHRITIS OF LEFT KNEE: ICD-10-CM

## 2022-09-01 DIAGNOSIS — S89.92XD LEFT KNEE INJURY, SUBSEQUENT ENCOUNTER: Primary | ICD-10-CM

## 2022-09-01 DIAGNOSIS — M25.562 ACUTE PAIN OF LEFT KNEE: ICD-10-CM

## 2022-09-01 PROCEDURE — 97110 THERAPEUTIC EXERCISES: CPT | Performed by: PHYSICAL THERAPIST

## 2022-09-01 PROCEDURE — 97140 MANUAL THERAPY 1/> REGIONS: CPT | Performed by: PHYSICAL THERAPIST

## 2022-09-01 NOTE — PROGRESS NOTES
PT Progress Note      Today's date: 2022  Patient name: Genaro Winkler  : 1985  MRN: [id number]  Referring provider: Alie Vemra MD  Dx:   Encounter Diagnosis     ICD-10-CM    1  Left knee injury, subsequent encounter  S89  92XD    2  Primary osteoarthritis of left knee  M17 12    3  Acute pain of left knee  M25 562             Assessment  Assessment details: Genaro Winkler is a 44 y o  female who presents with complaints of acute pain of left knee following left knee injury in the beginning of May  Imaging reveals fraying of medial meniscus  Patient is referred to outpatient PT by ortho MD Dr Morrell Fruit  No further referral appears necessary at this time based upon examination results  Patient's functional level is severely limited by her impairments listed  She will benefit from OPPT services to reduce swelling and pain, restore ROM and strength, and to maximize function, safety and QOL  Prognosis is good given HEP compliance and PT 2-3x/wk  Please contact me if you have any questions or recommendations  Thank you for the opportunity to share in  [facility]  RA      Genaro Winkler has demonstrated decreased pain, increased strength, increased range of motion, and increased activity tolerance since starting physical therapy services  She reports an improvement an overall improvement of 60% thus far  Patient is scheduled for left knee arthroscopy on  with Dr Shadi Lazaro  Continues to use B/L axillary crutches for ambulation  Difficulties continue with stair negotiation and ambulation  She continues to present with pain, decreased strength, decreased range of motion, and decreased activity tolerance and would benefit from additional skilled physical therapy interventions to address impairments and maximize function              Impairments: abnormal gait, abnormal muscle firing, abnormal or restricted ROM, abnormal movement, activity intolerance, impaired balance, impaired physical strength, lacks appropriate home exercise program, pain with function and weight-bearing intolerance  Functional limitations: unable to tolerate WB through L LE, ambulating with crutches and NWB LLE, difficulty with transfer from sit->stand, unable to drive to work, scoots up/down stairs,requires assistance of spouse for stairs, unable to participate in recreation (hiking)Understanding of Dx/Px/POC: good   Prognosis: good    Goals  STGs  1) IN 4 weeks patient will demonstrate improved L knee ROM to 90 deg or more flexion and terminal knee extension to improve functional mobility  Progressing   2) In 4 weeks patient will demonstrate improved quad strength noted with fair to good quad set for purpose of gaining control of L knee during functional WB activity  Progressing   3) In 4 weeks patient will tolerate ambulation with crutches and place 50% of weight through through LLE  Met     LTGs  1) In 6-8 weeks patient will tolerate FWB through LLE, able to ambulate level surfaces without AD  Progressing   2) IN 6-8 weeks patient will demonstrate independence with stair climbing using handrails but no AD  Progressing   3) In 4-8 weeks patient will tolerate driving to/from work to return to her usual work schedule  Progressing   4) In 8-12 weeks patient will be able to resume outdoor recreational activity including walking/hiking    Progressing     Plan  Patient would benefit from: skilled physical therapy  Referral necessary: No  Planned modality interventions: cryotherapy, TENS and unattended electrical stimulation  Planned therapy interventions: joint mobilization, manual therapy, massage, Miller taping, balance, balance/weight bearing training, muscle pump exercises, neuromuscular re-education, patient education, postural training, self care, strengthening, stretching, therapeutic activities, therapeutic exercise, flexibility, functional ROM exercises, gait training and home exercise program  Frequency: 2x week  Duration in weeks: 12  Plan of Care beginning date: 2022  Plan of Care expiration date: 10/18/2022  Treatment plan discussed with: patient        Subjective Evaluation    History of Present Illness  Date of onset: 2022  Mechanism of injury: Patient presents to PT with c/o L knee pain from an injury that occurred on Mother's Day  She was walking on a trail, went to jump out of the way of a puddle and her L knee gave out to the side  She experienced severe pain and swelling immediately  She was seen in the ER  She was provided with a knee immobilizer and had x-rays  X-rays were neg for fx  Patient could not have MRI secondary to implants in brain from brain surgery for aneurysm, but was able to have CT with contrast which showed fraying of the meniscus  She has been non-WB since the time of the injury  She is using crutches  She c/o severe sharp pain on the medial aspect of her knee whenever she attempts to WB through the L LE  She has fallen 2x since being on crutches  She reports hurting her L ankle with one of the falls, but had an x-ray and this was also neg for fx  She c/o swelling in the ankle and pins and needles in the ankle at night  Had injection from Dr Meenakshi Elias last week which felt a little better that day, but no longer feels much benefit from the injection     Pain     RA 9/1  At best pain ratin   0  At worst pain rating: 10  10 shooting pain   Quality: dull ache and sharp  Relieving factors: ice    Social Support  Stairs in house: yes   Lives in: multiple-level home  Lives with: young children and spouse    Employment status: working ( for [employer])    Diagnostic Tests  X-ray: normal  CT scan: abnormal  Treatments  No previous or current treatments  Patient Goals  Patient goals for therapy: decreased pain, return to sport/leisure activities, return to work, improved balance, increased motion, decreased edema, increased strength and independence with ADLs/IADLs  Patient goal: return to walking, driving, return to work full time        Objective     Observations   Left Knee   Positive for atrophy and edema  Additional Observation Details  (+) atrophy L quad and L gastroc    Tenderness   Left Knee   Tenderness in the inferior patella, lateral joint line, lateral patella, medial joint line, medial patella, patellar tendon and superior patella       Active Range of Motion   Left Knee      RA 9/1   Flexion: 30 degrees with pain   40 deg   Extension: -20 degrees with pain  -2 deg   Left Ankle/Foot   Dorsiflexion (ke): -20 degrees with pain    Strength/Myotome Testing     Left Knee   Quadriceps contraction: poor    RA 9/1   Able to perform Supine SLR     Tests     Additional Tests Details  Patient too guarded to perform special testing  Able to perform Supine SLR flexion independently with with c/o L hip flexor pain/pulling    Ambulation   Weight-Bearing Status   Weight-Bearing Status (Left): weight-bearing as tolerated   Assistive device used: crutches    Additional Weight-Bearing Status Details  Patient is allowed to be WBAT but is currently NWB due to the pain    Ambulation: Stairs     Additional Stairs Ambulation Details  Scoots up and down her steps inside of the home  Outside of home she receives assistance of her  to lift her            Precautions: h/o brain aneurysm and brain surgery  POC exp 10/18/22  Manuals 9/1 8/16 8/23 8/25 8/30   L knee stretching flex/ext RR TM MJC MJC TM                           Neuro Re-Ed        Quad sets 5" x20 5" x20 5" x20 5" x20 5" x20   Biodex weight shifting To tolerance (50% WB) 10' on/off x5 min total To tolerance (55% WB) 10' on/off x5 min total To tolerance  ( 55% WB)  10" on/off x 6 min total  To tolerance (57% WB) 10' on/off x6 min total To tolerance (60% WB) 10' on/off x5 min total                                           Ther Ex        NuStep  AAROM  L1 x10 mins AAROM  L2 x10 mins AAROM  L1 x10 mins AAROM  L1 x10 mins AAROM  L3 x10 mins   Ankle pumps Seated knee flexion AAROM  10"x10 10"x10 10"x10 10"x10 10"x10   Seated heel slides with self OP Supine w/ strap 5' x20 Supine w/ strap 5' x20 Supine w/ strap 5' x20 Supine w/ strap 5' x20 Supine w/ strap 5' x20   Calf stretch Supine w/strap  30"x3 Supine w/strap  30"x3 Supine w/strap  30"x3 Supine w/strap  30"x3 Supine w/strap  30"x3   HS stretch Supine w/strap  30"x3 Supine w/strap  30"x3 Supine w/strap  30"x3 Supine w/strap  30"x3 Supine w/strap  30"x3   SLR hip abduction sidelying 2x10 sidelying 2x10 sidelying  2x10  sidelying  2x10 2# 3x10   SLR in Flex 2x10 2x10 2x10 2x10 2# 3x10   Standing HR/TR x30 each     x30 ea   Lunges onto step 10''10x C step    10" x10 onto step "C"                    Ther Activity                        Gait Training        Level surfaces WBAT w/ B/L axillary crutches x200 ft WBAT w/ B/L axillary crutches x200 ft     WBAT w/ B/L axillary crutches x200 ft WBAT w/ B/L axillary crutches x120 ft    Stair training  Single axillary crutch in Pbars x5 passes with CGA      Modalities        CP post tx prn        Estim for pain control prn

## 2022-09-06 ENCOUNTER — OFFICE VISIT (OUTPATIENT)
Dept: PHYSICAL THERAPY | Facility: CLINIC | Age: 37
End: 2022-09-06
Payer: COMMERCIAL

## 2022-09-06 DIAGNOSIS — S89.92XD LEFT KNEE INJURY, SUBSEQUENT ENCOUNTER: Primary | ICD-10-CM

## 2022-09-06 DIAGNOSIS — M17.12 PRIMARY OSTEOARTHRITIS OF LEFT KNEE: ICD-10-CM

## 2022-09-06 PROCEDURE — 97140 MANUAL THERAPY 1/> REGIONS: CPT

## 2022-09-06 PROCEDURE — 97110 THERAPEUTIC EXERCISES: CPT

## 2022-09-06 PROCEDURE — 97112 NEUROMUSCULAR REEDUCATION: CPT

## 2022-09-06 NOTE — PROGRESS NOTES
Daily Note     Today's date: 2022  Patient name: Juany Cruz  : 1985  MRN: [id number]  Referring provider: Sun Mathias MD  Dx:   Encounter Diagnosis     ICD-10-CM    1  Left knee injury, subsequent encounter  S89  92XD    2  Primary osteoarthritis of left knee  M17 12                   Subjective: Pt reports she is working on stretching L knee at home  Awaiting scope on 22  Objective: See treatment diary below      Assessment: Tolerated treatment well  Pt remains very limited with L knee flexion  Continues to ambulate with axillary crutches  Patient demonstrated fatigue post treatment, exhibited good technique with therapeutic exercises and would benefit from continued PT      Plan: Continue per plan of care        Precautions: h/o brain aneurysm and brain surgery  POC exp 10/18/22  Manuals    L knee stretching flex/ext RR MJC MJC MJC TM                           Neuro Re-Ed        Quad sets 5" x20 5" x20 5" x20 5" x20 5" x20   Biodex weight shifting To tolerance (50% WB) 10' on/off x5 min total To tolerance (75% WB) 10' on/off x6 min total To tolerance  ( 55% WB)  10" on/off x 6 min total  To tolerance (57% WB) 10' on/off x6 min total To tolerance (60% WB) 10' on/off x5 min total                                           Ther Ex        NuStep  AAROM  L1 x10 mins AAROM  L3 x10 mins AAROM  L1 x10 mins AAROM  L1 x10 mins AAROM  L3 x10 mins   Ankle pumps        Seated knee flexion AAROM  10"x10 10"x10 10"x10 10"x10 10"x10   Seated heel slides with self OP Supine w/ strap 5' x20 Supine w/ strap 5' x20 Supine w/ strap 5' x20 Supine w/ strap 5' x20 Supine w/ strap 5' x20   Calf stretch Supine w/strap  30"x3 Supine w/strap  30"x3 Supine w/strap  30"x3 Supine w/strap  30"x3 Supine w/strap  30"x3   HS stretch Supine w/strap  30"x3 Supine w/strap  30"x3 Supine w/strap  30"x3 Supine w/strap  30"x3 Supine w/strap  30"x3   SLR hip abduction sidelying 2x10 sidelying 2x10 sidelying  2x10  sidelying  2x10 2# 3x10   SLR in Flex 2x10 2x10 2x10 2x10 2# 3x10   Standing HR/TR x30 each  x30   x30 ea   Lunges onto step 10''10x C step 10" x10 "C" step   10" x10 onto step "C"                    Ther Activity                        Gait Training        Level surfaces WBAT w/ B/L axillary crutches x200 ft WBAT w/ B/L axillary crutches x200 ft     WBAT w/ B/L axillary crutches x200 ft WBAT w/ B/L axillary crutches x120 ft    Stair training        Modalities        CP post tx prn        Estim for pain control prn

## 2022-09-08 ENCOUNTER — OFFICE VISIT (OUTPATIENT)
Dept: PHYSICAL THERAPY | Facility: CLINIC | Age: 37
End: 2022-09-08
Payer: COMMERCIAL

## 2022-09-08 DIAGNOSIS — S89.92XD LEFT KNEE INJURY, SUBSEQUENT ENCOUNTER: Primary | ICD-10-CM

## 2022-09-08 DIAGNOSIS — M25.562 ACUTE PAIN OF LEFT KNEE: ICD-10-CM

## 2022-09-08 DIAGNOSIS — M17.12 PRIMARY OSTEOARTHRITIS OF LEFT KNEE: ICD-10-CM

## 2022-09-08 PROCEDURE — 97140 MANUAL THERAPY 1/> REGIONS: CPT

## 2022-09-08 PROCEDURE — 97112 NEUROMUSCULAR REEDUCATION: CPT

## 2022-09-08 PROCEDURE — 97110 THERAPEUTIC EXERCISES: CPT

## 2022-09-08 NOTE — PROGRESS NOTES
Daily Note     Today's date: 2022  Patient name: Rupert Brown  : 1985  MRN: [id number]  Referring provider: Natanael Ruby MD  Dx:   Encounter Diagnosis     ICD-10-CM    1  Left knee injury, subsequent encounter  S89  92XD    2  Primary osteoarthritis of left knee  M17 12    3  Acute pain of left knee  M25 562                   Subjective: Pt did report that she tripped over her son and fell on Tuesday  She did hit the ground with her L knee  She had minor edema the next day but is feeling the same as she did before the fall today  She is scheduled for her knee scope on   Objective: See treatment diary below      Assessment: No increased edema or ecchymosis noted post fall  Tolerated treatment fair  She remains very limited in L knee flex  Patient demonstrated fatigue post treatment, exhibited good technique with therapeutic exercises and would benefit from continued PT      Plan: Continue per plan of care  Progress note during next visit  Progress treatment as tolerated         Precautions: h/o brain aneurysm and brain surgery  POC exp 10/18/22  Manuals    L knee stretching flex/ext RR MJC TM MJC TM                           Neuro Re-Ed        Quad sets 5" x20 5" x20 5" x20 5" x20 5" x20   Biodex weight shifting To tolerance (50% WB) 10' on/off x5 min total To tolerance (75% WB) 10' on/off x6 min total To tolerance  ( 75% WB)  10" on/off x 6 min total  To tolerance (57% WB) 10' on/off x6 min total To tolerance (60% WB) 10' on/off x5 min total                                           Ther Ex        NuStep  AAROM  L1 x10 mins AAROM  L3 x10 mins AAROM  L3 x10 mins AAROM  L1 x10 mins AAROM  L3 x10 mins   Ankle pumps        Seated knee flexion AAROM  10"x10 10"x10 10"x10 10"x10 10"x10   Seated heel slides with self OP Supine w/ strap 5' x20 Supine w/ strap 5' x20 Supine w/ strap 5' x20 Supine w/ strap 5' x20 Supine w/ strap 5' x20   Calf stretch Supine w/strap  30"x3 Supine w/strap  30"x3 Standing on fitter board  30"x3 Supine w/strap  30"x3 Supine w/strap  30"x3   HS stretch Supine w/strap  30"x3 Supine w/strap  30"x3 Supine w/strap  30"x3 Supine w/strap  30"x3 Supine w/strap  30"x3   SLR hip abduction sidelying 2x10 sidelying 2x10 sidelying  3x10  sidelying  2x10 2# 3x10   SLR in Flex 2x10 2x10 3x10 2x10 2# 3x10   Standing HR/TR x30 each  x30 x30  x30 ea   Lunges onto step 10''10x C step 10" x10 "C" step 10" x10 "C" step  10" x10 onto step "C"                    Ther Activity                        Gait Training        Level surfaces WBAT w/ B/L axillary crutches x200 ft WBAT w/ B/L axillary crutches x200 ft      WBAT w/ B/L axillary crutches x120 ft    Stair training        Modalities        CP post tx prn        Estim for pain control prn

## 2022-09-13 ENCOUNTER — OFFICE VISIT (OUTPATIENT)
Dept: OBGYN CLINIC | Facility: CLINIC | Age: 37
End: 2022-09-13
Payer: COMMERCIAL

## 2022-09-13 VITALS
DIASTOLIC BLOOD PRESSURE: 87 MMHG | HEIGHT: 69 IN | BODY MASS INDEX: 30.21 KG/M2 | SYSTOLIC BLOOD PRESSURE: 181 MMHG | HEART RATE: 70 BPM | WEIGHT: 204 LBS

## 2022-09-13 DIAGNOSIS — S83.005A DISLOCATION, PATELLA CLOSED, LEFT, INITIAL ENCOUNTER: ICD-10-CM

## 2022-09-13 DIAGNOSIS — S89.92XD LEFT KNEE INJURY, SUBSEQUENT ENCOUNTER: ICD-10-CM

## 2022-09-13 DIAGNOSIS — M24.662 FIBROSIS OF LEFT KNEE JOINT: Primary | ICD-10-CM

## 2022-09-13 PROCEDURE — 99213 OFFICE O/P EST LOW 20 MIN: CPT | Performed by: ORTHOPAEDIC SURGERY

## 2022-09-13 NOTE — LETTER
September 13, 2022     Patient: Criss Ying  YOB: 1985  Date of Visit: 9/13/2022      To Whom it May Concern:    Eamon Mcintosh is under my professional care  Kongraquel Knight was seen in my office on 9/13/2022  Genevieve Knight may return to work on September 26, 2022 with limitations that include sitting only  She should also be allowed to wear her sneakers at work  If you have any questions or concerns, please don't hesitate to call           Sincerely,          Do Villavicencio DO        CC: Criss Ying

## 2022-09-13 NOTE — PROGRESS NOTES
ASSESSMENT/PLAN:    Diagnoses and all orders for this visit:    Fibrosis of left knee joint  -     Splint    Left knee injury, subsequent encounter  -     Ambulatory Referral to Orthopedic Surgery    Dislocation, patella closed, left, initial encounter  -     Ambulatory Referral to Orthopedic Surgery        CT arthrogram of the patient's left knee was negative for any internal derangement or meniscal pathology  The patient was instructed to continue physical therapy and occupational therapy to work on strengthening, stretching and range of motion  She was also instructed to stop using the crutches, walker and knee brace  A Mary Lou splint was ordered to help with flexion of her knee  She will follow up with our office in 6 weeks  The patient is acceptable to this plan  Return in about 6 weeks (around 10/25/2022)  Attending note: There is no evidence of any patella instability at this point  The patient has a fibrosed left knee which is limited by flexion  She states she has been fairly dependent with a wheelchair, walker, and crutches  This injury occurred over 4 months ago  She was instructed to discontinue all the above  She was prescribed a Mary Lou splint  She was instructed to continue therapy for range of motion, strengthening stretching  She was instructed to walk as much as possible  She was also released to go back to work with restrictions  She was instructed to follow up in 6 weeks re-evaluation  The CT showed no evidence of any structural abnormality    _____________________________________________________  CHIEF COMPLAINT:  Chief Complaint   Patient presents with    Left Knee - Pain         SUBJECTIVE:  Tanna Lobo is a 44 y o  female who presents to our office complaining of left knee pain with decreased range of motion  The patient states that approximately 4 months ago she suffered a patella dislocation with subsequent spontaneous reduction    She has been treated conservatively since the injury with physical therapy and a knee brace  She states she also used a wheelchair for some time and has been ambulating using crutches  She does feel apprehensive that her kneecap may dislocate again  She denies any numbness or tingling  She denies any fever or chills  The following portions of the patient's history were reviewed and updated as appropriate: allergies, current medications, past family history, past medical history, past social history, past surgical history and problem list     PAST MEDICAL HISTORY:  Past Medical History:   Diagnosis Date    Brain aneurysm        PAST SURGICAL HISTORY:  Past Surgical History:   Procedure Laterality Date    BRAIN SURGERY         FAMILY HISTORY:  Family History   Problem Relation Age of Onset    Aneurysm Mother     Heart attack Mother     Schizophrenia Father     Diabetes Father        SOCIAL HISTORY:  Social History     Tobacco Use    Smoking status: Never Smoker    Smokeless tobacco: Never Used   Vaping Use    Vaping Use: Never used   Substance Use Topics    Alcohol use: Never    Drug use: Never       MEDICATIONS:  No current outpatient medications on file  ALLERGIES:  No Known Allergies    ROS:  Review of Systems     Constitutional: Negative for fatigue, fever or loss of appetite  HENT: Negative  Respiratory: Negative for shortness of breath, dyspnea  Cardiovascular: Negative for chest pain/tightness  Gastrointestinal: Negative for abdominal pain, N/V  Endocrine: Negative for cold/heat intolerance, unexplained weight loss/gain  Genitourinary: Negative for flank pain, dysuria, hematuria  Musculoskeletal: Positive for arthralgia   Skin: Negative for rash  Neurological: Negative for numbness or tingling  Psychiatric/Behavioral: Negative for agitation    _____________________________________________________  PHYSICAL EXAMINATION:    Blood pressure (!) 181/87, pulse 70, height 5' 9" (1 753 m), weight 92 5 kg (204 lb)     Constitutional: Oriented to person, place, and time  Appears well-developed and well-nourished  No distress  HENT:   Head: Normocephalic  Eyes: Conjunctivae are normal  Right eye exhibits no discharge  Left eye exhibits no discharge  No scleral icterus  Cardiovascular: Normal rate  Pulmonary/Chest: Effort normal    Neurological: Alert and oriented to person, place, and time  Skin: Skin is warm and dry  No rash noted  Not diaphoretic  No erythema  No pallor  Psychiatric: Normal mood and affect  Behavior is normal  Judgment and thought content normal       MUSCULOSKELETAL EXAMINATION:   Physical Exam  Ortho Exam    Left lower extremity is neurovascularly intact  Toes are pink and mobile  Compartments are soft  No instability of the patella  No ligament laxity  Range of motion of the knee is from 0 to a 70°  Medial joint line tenderness  Brisk cap refill  Sensation intact  No effusion present  Objective:  BP Readings from Last 1 Encounters:   09/13/22 (!) 181/87      Wt Readings from Last 1 Encounters:   09/13/22 92 5 kg (204 lb)        BMI:   Estimated body mass index is 30 13 kg/m² as calculated from the following:    Height as of this encounter: 5' 9" (1 753 m)  Weight as of this encounter: 92 5 kg (204 lb)          Scribe Attestation    I,:  Vanessa Martins PA-C am acting as a scribe while in the presence of the attending physician :       I,:  Marquis Dela Cruz, DO personally performed the services described in this documentation    as scribed in my presence :

## 2022-09-15 ENCOUNTER — OFFICE VISIT (OUTPATIENT)
Dept: PHYSICAL THERAPY | Facility: CLINIC | Age: 37
End: 2022-09-15
Payer: COMMERCIAL

## 2022-09-15 DIAGNOSIS — M25.562 ACUTE PAIN OF LEFT KNEE: ICD-10-CM

## 2022-09-15 DIAGNOSIS — M17.12 PRIMARY OSTEOARTHRITIS OF LEFT KNEE: ICD-10-CM

## 2022-09-15 DIAGNOSIS — S89.92XD LEFT KNEE INJURY, SUBSEQUENT ENCOUNTER: Primary | ICD-10-CM

## 2022-09-15 PROCEDURE — 97140 MANUAL THERAPY 1/> REGIONS: CPT | Performed by: PHYSICAL THERAPIST

## 2022-09-15 PROCEDURE — 97112 NEUROMUSCULAR REEDUCATION: CPT | Performed by: PHYSICAL THERAPIST

## 2022-09-15 PROCEDURE — 97110 THERAPEUTIC EXERCISES: CPT | Performed by: PHYSICAL THERAPIST

## 2022-09-15 NOTE — PROGRESS NOTES
Daily Note     Today's date: 9/15/2022  Patient name: Caryl Douglas  : 1985  MRN: [id number]  Referring provider: Melinda Elias MD  Dx:   Encounter Diagnosis     ICD-10-CM    1  Left knee injury, subsequent encounter  S89  92XD    2  Primary osteoarthritis of left knee  M17 12    3  Acute pain of left knee  M25 562                   Subjective: Patient saw Dr Cony Elizabeth yesterday  She reports that he ordered her a Dynasplint and that someone should be contacting her  She said she is trying to wean from the crutches  She is planning on going back to work in about a week  Objective: See treatment diary below      Assessment: Tolerated treatment well  Instructed patient on ambulation with single crutch on R side (patient was ambulating with crutch on L side into office today)  Patient demonstrates poor balance and limited knee flexion when attempting to ambulate without any AD  Performed STM to patellar borders, patellar glides and STM to quad today in effort to reduce pain and soft tissue tightness with manual stretching  Followed this with placing patient on leg press and patient demonstrated improved knee flexion when on leg press  Cont with quad weakness as evidenced by quad lag with SLRs  Patient demonstrated fatigue post treatment, exhibited good technique with therapeutic exercises and would benefit from continued PT      Plan: Continue per plan of care  Progress treatment as tolerated         Precautions: h/o brain aneurysm and brain surgery  POC exp 10/18/22  Manuals 9/1 9/6 9/8 9/15 8/30   L knee stretching flex/ext RR MJC TM KG TM   STM to L quad, patellar borders    KG    Patellar glides    KG            Neuro Re-Ed        Quad sets 5" x20 5" x20 5" x20 5" x20 5" x20   Biodex weight shifting To tolerance (50% WB) 10' on/off x5 min total To tolerance (75% WB) 10' on/off x6 min total To tolerance  ( 75% WB)  10" on/off x 6 min total  To tolerance  ( 75% WB)  10" on/off x 6 min total  To tolerance (60% WB) 10' on/off x5 min total                                           Ther Ex        NuStep  AAROM  L1 x10 mins AAROM  L3 x10 mins AAROM  L3 x10 mins AAROM  L3 x10 mins AAROM  L3 x10 mins   Ankle pumps        Seated knee flexion AAROM  10"x10 10"x10 10"x10  10"x10   Seated heel slides with self OP Supine w/ strap 5' x20 Supine w/ strap 5' x20 Supine w/ strap 5' x20  Supine w/ strap 5' x20   Calf stretch Supine w/strap  30"x3 Supine w/strap  30"x3 Standing on fitter board  30"x3  Supine w/strap  30"x3   HS stretch Supine w/strap  30"x3 Supine w/strap  30"x3 Supine w/strap  30"x3  Supine w/strap  30"x3   SLR hip abduction sidelying 2x10 sidelying 2x10 sidelying  3x10  1#   sidelying  3x10 2# 3x10   SLR in Flex 2x10 2x10 3x10 3x10 2# 3x10   Standing HR/TR x30 each  x30 x30  x30 ea   Lunges onto step 10''10x C step 10" x10 "C" step 10" x10 "C" step  10" x10 onto step "C"    Leg press    100# 3x10            Ther Activity                        Gait Training        Level surfaces WBAT w/ B/L axillary crutches x200 ft WBAT w/ B/L axillary crutches x200 ft      WBAT w/ single crutch  Through clinic    Stair training        Modalities        CP post tx prn        Estim for pain control prn

## 2022-09-20 ENCOUNTER — OFFICE VISIT (OUTPATIENT)
Dept: PHYSICAL THERAPY | Facility: CLINIC | Age: 37
End: 2022-09-20
Payer: COMMERCIAL

## 2022-09-20 DIAGNOSIS — M25.562 ACUTE PAIN OF LEFT KNEE: ICD-10-CM

## 2022-09-20 DIAGNOSIS — S89.92XD LEFT KNEE INJURY, SUBSEQUENT ENCOUNTER: Primary | ICD-10-CM

## 2022-09-20 DIAGNOSIS — M17.12 PRIMARY OSTEOARTHRITIS OF LEFT KNEE: ICD-10-CM

## 2022-09-20 PROCEDURE — 97140 MANUAL THERAPY 1/> REGIONS: CPT

## 2022-09-20 PROCEDURE — 97112 NEUROMUSCULAR REEDUCATION: CPT

## 2022-09-20 PROCEDURE — 97110 THERAPEUTIC EXERCISES: CPT

## 2022-09-20 NOTE — PROGRESS NOTES
Daily Note     Today's date: 2022  Patient name: Burgess Goodman  : 1985  MRN: [id number]  Referring provider: Autumn Mckinney MD  Dx:   Encounter Diagnosis     ICD-10-CM    1  Left knee injury, subsequent encounter  S89  92XD    2  Primary osteoarthritis of left knee  M17 12    3  Acute pain of left knee  M25 562                   Subjective: Pt reported that she has been amb with a a single crutch at home  She feels this is causing LBP  Objective: See treatment diary below      Assessment: Tolerated treatment well  She continues to be very limited in L knee flexion  Cueing on proper stride length and heel strike required to resolve LBP  Patient demonstrated fatigue post treatment, exhibited good technique with therapeutic exercises and would benefit from continued PT      Plan: Continue per plan of care  Progress treatment as tolerated         Precautions: h/o brain aneurysm and brain surgery  POC exp 10/18/22  Manuals 9/1 9/6 9/8 9/15 9/20   L knee stretching flex/ext RR MJC TM KG TM   STM to L quad, patellar borders    KG TM   Patellar glides    KG TM           Neuro Re-Ed        Quad sets 5" x20 5" x20 5" x20 5" x20    Biodex weight shifting To tolerance (50% WB) 10' on/off x5 min total To tolerance (75% WB) 10' on/off x6 min total To tolerance  ( 75% WB)  10" on/off x 6 min total  To tolerance  ( 75% WB)  10" on/off x 6 min total  To tolerance  ( 75% WB)  10" on/off x 6 min total                                            Ther Ex        NuStep  AAROM  L1 x10 mins AAROM  L3 x10 mins AAROM  L3 x10 mins AAROM  L3 x10 mins AAROM on upright bike x10 min   Ankle pumps        Seated knee flexion AAROM  10"x10 10"x10 10"x10  10" x10   Seated heel slides with self OP Supine w/ strap 5' x20 Supine w/ strap 5' x20 Supine w/ strap 5' x20     Calf stretch Supine w/strap  30"x3 Supine w/strap  30"x3 Standing on fitter board  30"x3     HS stretch Supine w/strap  30"x3 Supine w/strap  30"x3 Supine w/strap  30"x3     SLR hip abduction sidelying 2x10 sidelying 2x10 sidelying  3x10  1#   sidelying  3x10 1#   sidelying  3x10   SLR in Flex 2x10 2x10 3x10 3x10 1# 3x10   Standing HR/TR x30 each  x30 x30     Lunges onto step 10''10x C step 10" x10 "C" step 10" x10 "C" step  10" x10 "C" step   Leg press    100# 3x10 100# 3x10   Low mat table to step transfer      "B+C" 10" x10   Ther Activity                        Gait Training        Level surfaces WBAT w/ B/L axillary crutches x200 ft WBAT w/ B/L axillary crutches x200 ft      WBAT w/ single crutch  Through clinic WBAT w/ single crutch  Through clinic   Stair training        Modalities        CP post tx prn        Estim for pain control prn

## 2022-09-22 ENCOUNTER — APPOINTMENT (OUTPATIENT)
Dept: PHYSICAL THERAPY | Facility: CLINIC | Age: 37
End: 2022-09-22
Payer: COMMERCIAL

## 2022-09-23 ENCOUNTER — APPOINTMENT (OUTPATIENT)
Dept: PHYSICAL THERAPY | Facility: CLINIC | Age: 37
End: 2022-09-23
Payer: COMMERCIAL

## 2022-09-27 ENCOUNTER — APPOINTMENT (OUTPATIENT)
Dept: PHYSICAL THERAPY | Facility: CLINIC | Age: 37
End: 2022-09-27
Payer: COMMERCIAL

## 2022-09-29 ENCOUNTER — OFFICE VISIT (OUTPATIENT)
Dept: PHYSICAL THERAPY | Facility: CLINIC | Age: 37
End: 2022-09-29
Payer: COMMERCIAL

## 2022-09-29 DIAGNOSIS — S89.92XD LEFT KNEE INJURY, SUBSEQUENT ENCOUNTER: Primary | ICD-10-CM

## 2022-09-29 DIAGNOSIS — M25.562 ACUTE PAIN OF LEFT KNEE: ICD-10-CM

## 2022-09-29 DIAGNOSIS — M17.12 PRIMARY OSTEOARTHRITIS OF LEFT KNEE: ICD-10-CM

## 2022-09-29 PROCEDURE — 97110 THERAPEUTIC EXERCISES: CPT | Performed by: PHYSICAL THERAPIST

## 2022-09-29 PROCEDURE — 97140 MANUAL THERAPY 1/> REGIONS: CPT | Performed by: PHYSICAL THERAPIST

## 2022-09-29 NOTE — PROGRESS NOTES
Daily Note     Today's date: 2022  Patient name: [place] Rust  : 1985  MRN: [id number]  Referring provider: Esthela Robles MD  Dx:   Encounter Diagnosis     ICD-10-CM    1  Left knee injury, subsequent encounter  S89  92XD    2  Primary osteoarthritis of left knee  M17 12    3  Acute pain of left knee  M25 562                   Subjective: Patient reports she has been back at work  Her knee is still stiff  She is still waiting on her Dynasplint  Objective: See treatment diary below      Assessment: Tolerated treatment well  She remains limited in knee flexion ROM and will benefit from Dynasplint to increase functional knee flexion  She continues to ambulate with a single crutch due to poor weight shift and poor balance through L LE without use of crutch  Patient demonstrated fatigue post treatment, exhibited good technique with therapeutic exercises and would benefit from continued PT      Plan: Continue per plan of care  Progress treatment as tolerated         Precautions: h/o brain aneurysm and brain surgery  POC exp 10/18/22  Manuals 9/29 9/6 9/8 9/15 9/20   L knee stretching flex/ext KG MJC TM KG TM   STM to L quad, patellar borders KG   KG TM   Patellar glides KG   KG TM           Neuro Re-Ed        Quad sets 5" x20 5" x20 5" x20 5" x20    Biodex weight shifting Resume NV To tolerance (75% WB) 10' on/off x6 min total To tolerance  ( 75% WB)  10" on/off x 6 min total  To tolerance  ( 75% WB)  10" on/off x 6 min total  To tolerance  ( 75% WB)  10" on/off x 6 min total                                            Ther Ex        NuStep  AAROM on upright bike x10 min AAROM  L3 x10 mins AAROM  L3 x10 mins AAROM  L3 x10 mins AAROM on upright bike x10 min   Seated knee flexion AAROM  10"x10 w/self overpressure 10"x10 10"x10  10" x10   Seated heel slides with self OP Supine w/ strap 5' x20 Supine w/ strap 5' x20 Supine w/ strap 5' x20     Calf stretch DC Supine w/strap  30"x3 Standing on fitter board  30"x3     HS stretch DC Supine w/strap  30"x3 Supine w/strap  30"x3     SLR hip abduction 1# sidelying 3x10 sidelying 2x10 sidelying  3x10  1#   sidelying  3x10 1#   sidelying  3x10   SLR in Flex 1# 3x10 2x10 3x10 3x10 1# 3x10   Standing HR/TR Resume NV x30 x30     Lunges onto step Resume NV 10" x10 "C" step 10" x10 "C" step  10" x10 "C" step   Leg press 100# 3x10   100# 3x10 100# 3x10   Low mat table to step transfer  "B+C" 10" x10    "B+C" 10" x10   HS curls                        Gait Training        Level surfaces  WBAT w/ B/L axillary crutches x200 ft      WBAT w/ single crutch  Through clinic WBAT w/ single crutch  Through clinic   Stair training        Modalities        CP post tx prn        Estim for pain control prn

## 2022-10-04 ENCOUNTER — OFFICE VISIT (OUTPATIENT)
Dept: PHYSICAL THERAPY | Facility: CLINIC | Age: 37
End: 2022-10-04
Payer: COMMERCIAL

## 2022-10-04 DIAGNOSIS — S89.92XD LEFT KNEE INJURY, SUBSEQUENT ENCOUNTER: Primary | ICD-10-CM

## 2022-10-04 DIAGNOSIS — M25.562 ACUTE PAIN OF LEFT KNEE: ICD-10-CM

## 2022-10-04 DIAGNOSIS — M17.12 PRIMARY OSTEOARTHRITIS OF LEFT KNEE: ICD-10-CM

## 2022-10-04 PROCEDURE — 97140 MANUAL THERAPY 1/> REGIONS: CPT

## 2022-10-04 PROCEDURE — 97110 THERAPEUTIC EXERCISES: CPT

## 2022-10-04 NOTE — PROGRESS NOTES
Daily Note     Today's date: 10/4/2022  Patient name: Leopold Penman  : 1985  MRN: [id number]  Referring provider: Ryan Cano MD  Dx:   Encounter Diagnosis     ICD-10-CM    1  Left knee injury, subsequent encounter  S89  92XD    2  Primary osteoarthritis of left knee  M17 12    3  Acute pain of left knee  M25 562                   Subjective: Pt reported that she still does not have her Dynasplint to increase her L knee flexion  She is back to work  She is having trouble getting on/off the ground while at work  Objective: See treatment diary below      Assessment: IASTM performed to L quad with no adverse affect  Tolerated treatment well  She remains limited in flexion of the L knee  Patient demonstrated fatigue post treatment, exhibited good technique with therapeutic exercises and would benefit from continued PT      Plan: Continue per plan of care  Progress treatment as tolerated         Precautions: h/o brain aneurysm and brain surgery  POC exp 10/18/22  Manuals 9/29 10/4 9/8 9/15 9/20   L knee stretching flex/ext KG TM TM KG TM   STM to L quad, patellar borders KG IASTM to quad  TM  KG TM   Patellar glides KG TM  KG TM           Neuro Re-Ed        Quad sets 5" x20 5" x20 5" x20 5" x20    Biodex weight shifting Resume NV  To tolerance  ( 75% WB)  10" on/off x 6 min total  To tolerance  ( 75% WB)  10" on/off x 6 min total  To tolerance  ( 75% WB)  10" on/off x 6 min total                                            Ther Ex        NuStep  AAROM on upright bike x10 min AAROM on upright bike x10 min AAROM  L3 x10 mins AAROM  L3 x10 mins AAROM on upright bike x10 min   Seated knee flexion AAROM  10"x10 w/self overpressure 10"x10 w/self overpressure 10"x10  10" x10   Seated heel slides with self OP Supine w/ strap 5' x20 Supine w/ strap 5' x20 Supine w/ strap 5' x20     Calf stretch DC  Standing on fitter board  30"x3     HS stretch DC  Supine w/strap  30"x3     SLR hip abduction 1# sidelying 3x10 1# sidelying 3x10 sidelying  3x10  1#   sidelying  3x10 1#   sidelying  3x10   SLR in Flex 1# 3x10 1# 3x10 3x10 3x10 1# 3x10   Standing HR/TR Resume NV x30 x30     Lunges onto step Resume NV 10" x10 "C" step 10" x10 "C" step  10" x10 "C" step   Leg press 100# 3x10 100# 3x10  100# 3x10 100# 3x10   Low mat table to step transfer  "B+C" 10" x10 "B+C" 10" x10   "B+C" 10" x10   HS curls                        Gait Training        Level surfaces      WBAT w/ single crutch  Through clinic WBAT w/ single crutch  Through clinic   Stair training        Modalities        CP post tx prn        Estim for pain control prn

## 2022-10-06 ENCOUNTER — OFFICE VISIT (OUTPATIENT)
Dept: PHYSICAL THERAPY | Facility: CLINIC | Age: 37
End: 2022-10-06
Payer: COMMERCIAL

## 2022-10-06 DIAGNOSIS — M25.562 ACUTE PAIN OF LEFT KNEE: ICD-10-CM

## 2022-10-06 DIAGNOSIS — S89.92XD LEFT KNEE INJURY, SUBSEQUENT ENCOUNTER: Primary | ICD-10-CM

## 2022-10-06 DIAGNOSIS — M17.12 PRIMARY OSTEOARTHRITIS OF LEFT KNEE: ICD-10-CM

## 2022-10-06 PROCEDURE — 97140 MANUAL THERAPY 1/> REGIONS: CPT | Performed by: PHYSICAL THERAPIST

## 2022-10-06 PROCEDURE — 97110 THERAPEUTIC EXERCISES: CPT | Performed by: PHYSICAL THERAPIST

## 2022-10-06 PROCEDURE — 97010 HOT OR COLD PACKS THERAPY: CPT | Performed by: PHYSICAL THERAPIST

## 2022-10-06 NOTE — PROGRESS NOTES
Daily Note     Today's date: 10/6/2022  Patient name: Juany Cruz  : 1985  MRN: [id number]  Referring provider: Sun Mathias MD  Dx:   Encounter Diagnosis     ICD-10-CM    1  Left knee injury, subsequent encounter  S89  92XD    2  Primary osteoarthritis of left knee  M17 12    3  Acute pain of left knee  M25 562                   Subjective: Patient reports she checked in with dynaspint; however, dynasplint never received an order from the MD [facility] office was contacted and [employer] is working on getting pt a unit  Objective: See treatment diary below      Assessment: Pt continues to be very limited in knee flexion ROM  Initiated LLLD on leg extension machine this date with 5#  Pt was only able to tolerate 2 5 minutes prior to too much pain in medial knee  Educated pt to continue to stretch as much as possible outside of therapy especially while sitting in her desk chair at work  Tolerated treatment well  Patient demonstrated fatigue post treatment, exhibited good technique with therapeutic exercises and would benefit from continued PT      Plan: Progress treatment as tolerated         Precautions: h/o brain aneurysm and brain surgery  POC exp 10/18/22  Manuals 9/29 10/4 10-6 9/15 9/20   L knee stretching flex/ext KG TM JG KG TM   STM to L quad, patellar borders KG IASTM to quad  TM IASTM to quad  JG and massage roller while in flexion KG TM   Patellar glides KG TM JG, grade IV KG TM           Neuro Re-Ed        Quad sets 5" x20 5" x20 NV 5" x20    Biodex weight shifting Resume NV   To tolerance  ( 75% WB)  10" on/off x 6 min total  To tolerance  ( 75% WB)  10" on/off x 6 min total                                            Ther Ex        NuStep  AAROM on upright bike x10 min AAROM on upright bike x10 min AAROM on upright bike x10 min AAROM  L3 x10 mins AAROM on upright bike x10 min   Seated knee flexion AAROM  10"x10 w/self overpressure 10"x10 w/self overpressure 10 x 10" overpressure  10" x10 Seated heel slides with self OP Supine w/ strap 5' x20 Supine w/ strap 5' x20 NV     Calf stretch DC       HS stretch DC       SLR hip abduction 1# sidelying 3x10 1# sidelying 3x10 1# sidelying 3x10 1#   sidelying  3x10 1#   sidelying  3x10   SLR in Flex 1# 3x10 1# 3x10 1# sidelying 3x10 3x10 1# 3x10   Standing HR/TR Resume NV x30 NV     Lunges onto step Resume NV 10" x10 "C" step NV  10" x10 "C" step   Leg press 100# 3x10 100# 3x10 # 3x10 100# 3x10   Low mat table to step transfer  "B+C" 10" x10 "B+C" 10" x10 "B+C" 10" x10  "B+C" 10" x10   HS curls        Seated leg ext machine LLLD knee flexion stretch   2 5 mins, 5#             Gait Training        Level surfaces      WBAT w/ single crutch  Through clinic WBAT w/ single crutch  Through clinic   Stair training        Modalities        CP post tx prn   8 mins     Estim for pain control prn

## 2022-10-10 NOTE — PROGRESS NOTES
PT Progress Note      Today's date: 10/13/2022  Patient name: Nilay Price  : 1985  MRN: [id number]  Referring provider: Joelle Price MD  Dx:   Encounter Diagnosis     ICD-10-CM    1  Left knee injury, subsequent encounter  S89  92XD    2  Primary osteoarthritis of left knee  M17 12    3  Acute pain of left knee  M25 562               Assessment  Assessment details: Nilay Price is a 44 y o  female who presents with complaints of acute pain of left knee following left knee injury in the beginning of May  Imaging reveals fraying of medial meniscus  Patient is referred to outpatient PT by ortho MD Dr Tegan Tran  No further referral appears necessary at this time based upon examination results  Patient's functional level is severely limited by her impairments listed  She will benefit from OPPT services to reduce swelling and pain, restore ROM and strength, and to maximize function, safety and QOL  Prognosis is good given HEP compliance and PT 2-3x/wk  Please contact me if you have any questions or recommendations  Thank you for the opportunity to share in  [facility]  RA      Nilay Price has demonstrated decreased pain, increased strength, increased range of motion, and increased activity tolerance since starting physical therapy services  She reports an improvement an overall improvement of 60% thus far  Patient is scheduled for left knee arthroscopy on  with Dr Gonzalez Dose  Continues to use B/L axillary crutches for ambulation  Difficulties continue with stair negotiation and ambulation  She continues to present with pain, decreased strength, decreased range of motion, and decreased activity tolerance and would benefit from additional skilled physical therapy interventions to address impairments and maximize function  RA 10/13/22: The patient has been seen in PT for a total of 19 visits since [facility]    She has made improvements since her last re-eval and continues to make progress towards her goals   She has complaints of intermittent pain with most pain being along her medial knee  She demonstrates deficits with decreased A/PROM and strength, decreased activity tolerance, antalgic gait with use of AD, difficulty with stair negotiation and pain with completing her ADLs and tasks at home  She ambulates with use of crutches for household and community distances but recently has started to walk short distances without them  She is now able to go up and down the steps with non-reciprocal gait pattern  She was fitted for Dynasplint today to help her with knee flexion  The patient would benefit from continued PT to address deficits and improve function  Tx to include ROM, stretching, strengthening, modalities, HEP, pt education, postural ed, lifting/body mechanics, neuro re-ed, balance/proprioception Te, MT and equipment  Plan to continue with PT until her next doctor appointment and will progress as able in upcoming visits with ROM, stretching, strengthening, modalities, HEP, flexibility, gait and balance            Impairments: abnormal gait, abnormal muscle firing, abnormal or restricted ROM, abnormal movement, activity intolerance, impaired balance, impaired physical strength, lacks appropriate home exercise program, pain with function and weight-bearing intolerance  Functional limitations: unable to tolerate WB through L LE, ambulating with crutches and WBAT LLE, difficulty with transfer from sit->stand, unable to drive to work, difficulty with stairs,requires assistance of spouse for stairs, unable to participate in recreation (hiking)Understanding of Dx/Px/POC: good   Prognosis: good    Goals  STGs  1) IN 4 weeks patient will demonstrate improved L knee ROM to 90 deg or more flexion and terminal knee extension to improve functional mobility  Progressing   2) In 4 weeks patient will demonstrate improved quad strength noted with fair to good quad set for purpose of gaining control of L knee during functional WB activity  Progressing   3) In 4 weeks patient will tolerate ambulation with crutches and place 50% of weight through through LLE  Met     LTGs  1) In 6-8 weeks patient will tolerate FWB through LLE, able to ambulate level surfaces without AD  Progressing   2) IN 6-8 weeks patient will demonstrate independence with stair climbing using handrails but no AD  Progressing   3) In 4-8 weeks patient will tolerate driving to/from work to return to her usual work schedule  Progressing   4) In 8-12 weeks patient will be able to resume outdoor recreational activity including walking/hiking  Progressing     Plan  Patient would benefit from: skilled physical therapy  Referral necessary: No  Planned modality interventions: cryotherapy, TENS and unattended electrical stimulation  Planned therapy interventions: joint mobilization, manual therapy, massage, Miller taping, balance, balance/weight bearing training, muscle pump exercises, neuromuscular re-education, patient education, postural training, self care, strengthening, stretching, therapeutic activities, therapeutic exercise, flexibility, functional ROM exercises, gait training and home exercise program  Frequency: 2x week  Duration in weeks: 12  Plan of Care beginning date: 10/13/2022  Plan of Care expiration date: 1/5/23  Treatment plan discussed with: patient        Subjective Evaluation    History of Present Illness  Date of onset: 5/8/2022  Mechanism of injury: Patient presents to PT with c/o L knee pain from an injury that occurred on Mother's Day  She was walking on a trail, went to jump out of the way of a puddle and her L knee gave out to the side  She experienced severe pain and swelling immediately  She was seen in the ER  She was provided with a knee immobilizer and had x-rays  X-rays were neg for fx   Patient could not have MRI secondary to implants in brain from brain surgery for aneurysm, but was able to have CT with contrast which showed fraying of the meniscus  She has been non-WB since the time of the injury  She is using crutches  She c/o severe sharp pain on the medial aspect of her knee whenever she attempts to WB through the L LE  She has fallen 2x since being on crutches  She reports hurting her L ankle with one of the falls, but had an x-ray and this was also neg for fx  She c/o swelling in the ankle and pins and needles in the ankle at night  Had injection from Dr Manuel Ye last week which felt a little better that day, but no longer feels much benefit from the injection  UPDATE 10/13/22: The patient states that her pain is intermittent  Pain when present is along her medial knee  Notes no pain at rest with her knee straight  Feels her knee has been bending better  She did see the [employer] rep today and was fitted for a brace to help bend her knee  She will be going back to see the doctor on 10/25/22 or her follow up appointment, possibly will schedule her knee scope at that appointment  Pain     RA 9/   RA 10/13  At best pain ratin   0   0/10 best  At worst pain rating: 10  10 shooting pain  10/10 worst   Quality: dull ache and sharp  Relieving factors: ice    Social Support  Stairs in house: yes   Lives in: multiple-level home  Lives with: young children and spouse    Employment status: working ( for [employer])    Diagnostic Tests  X-ray: normal  CT scan: abnormal  Treatments  No previous or current treatments  Patient Goals  Patient goals for therapy: decreased pain, return to sport/leisure activities, return to work, improved balance, increased motion, decreased edema, increased strength and independence with ADLs/IADLs  Patient goal: return to walking, driving, return to work full time        Objective     Observations   Left Knee   Positive for atrophy and edema       Additional Observation Details  (+) atrophy L quad and L gastroc - persists    Tenderness   Left Knee   Tenderness in the inferior patella, lateral joint line, lateral patella, medial joint line, medial patella, patellar tendon and superior patella  Active Range of Motion   Left Knee      RA 9/1        RA 10/13:  Flexion: 30 degrees with pain   40 deg        80 deg/82 deg will OP  Extension: -20 degrees with pain  -2 deg         0 deg  Left Ankle/Foot   Dorsiflexion (ke): [facility]    Strength/Myotome Testing     Left Knee   Quadriceps contraction: fair     RA 9/1   Able to perform Supine SLR     Tests     Additional Tests Details  Patient too guarded to perform special testing  Able to perform Supine SLR flexion independently with with c/o L hip flexor pain/pulling    Ambulation   Weight-Bearing Status   Weight-Bearing Status (Left): weight-bearing as tolerated   Assistive device used: one crutch for household and community distances - has now started to try to walk without it  Additional Weight-Bearing Status Details  Patient is WBAT    Ambulation: Stairs     Additional Stairs Ambulation Details  Has been going up and down the steps with non-reciprocal gait pattern                  Precautions: h/o brain aneurysm and brain surgery  POC exp 10/18/22  Manuals 9/29 10/4 10-6 10/13 9/20   L knee stretching flex/ext KG TM JG ML TM   STM to L quad, patellar borders KG IASTM to quad  TM IASTM to quad  JG and massage roller while in flexion NP resume NV TM   Patellar glides KG TM JG, grade IV ML, grade IV TM           Neuro Re-Ed        Quad sets 5" x20 5" x20 NV 5" x20    Biodex weight shifting Resume NV    To tolerance  ( 75% WB)  10" on/off x 6 min total                                            Ther Ex        NuStep  AAROM on upright bike x10 min AAROM on upright bike x10 min AAROM on upright bike x10 min AAROM on upright bike  10 mins AAROM on upright bike x10 min   Seated knee flexion AAROM  10"x10 w/self overpressure 10"x10 w/self overpressure 10 x 10" overpressure  10" x10   Seated heel slides with self OP Supine w/ strap 5' x20 Supine w/ strap 5' x20 NV Supine w/strap 5" x 20    Calf stretch DC       HS stretch DC       SLR hip abduction 1# sidelying 3x10 1# sidelying 3x10 1# sidelying 3x10 NP resume NV 1#   sidelying  3x10   SLR in Flex 1# 3x10 1# 3x10 1# sidelying 3x10 NP resume NV 1# 3x10   Standing HR/TR Resume NV x30 NV NP resume NV    Lunges onto step Resume NV 10" x10 "C" step NV NP resume NV 10" x10 "C" step   Leg press 100# 3x10 100# 3x10 NV NP resume # 3x10   Low mat table to step transfer  "B+C" 10" x10 "B+C" 10" x10 "B+C" 10" x10 NP resume NV "B+C" 10" x10   HS curls        Seated leg ext machine LLLD knee flexion stretch   2 5 mins, 5# NP resume NV            Gait Training        Level surfaces       WBAT w/ single crutch  Through clinic   Stair training        Modalities        CP post tx prn   8 mins     Estim for pain control prn

## 2022-10-11 ENCOUNTER — APPOINTMENT (OUTPATIENT)
Dept: PHYSICAL THERAPY | Facility: CLINIC | Age: 37
End: 2022-10-11

## 2022-10-13 ENCOUNTER — EVALUATION (OUTPATIENT)
Dept: PHYSICAL THERAPY | Facility: CLINIC | Age: 37
End: 2022-10-13
Payer: COMMERCIAL

## 2022-10-13 DIAGNOSIS — S89.92XD LEFT KNEE INJURY, SUBSEQUENT ENCOUNTER: Primary | ICD-10-CM

## 2022-10-13 DIAGNOSIS — M17.12 PRIMARY OSTEOARTHRITIS OF LEFT KNEE: ICD-10-CM

## 2022-10-13 DIAGNOSIS — M25.562 ACUTE PAIN OF LEFT KNEE: ICD-10-CM

## 2022-10-13 PROCEDURE — 97140 MANUAL THERAPY 1/> REGIONS: CPT | Performed by: PHYSICAL THERAPIST

## 2022-10-13 PROCEDURE — 97110 THERAPEUTIC EXERCISES: CPT | Performed by: PHYSICAL THERAPIST

## 2022-10-18 ENCOUNTER — OFFICE VISIT (OUTPATIENT)
Dept: PHYSICAL THERAPY | Facility: CLINIC | Age: 37
End: 2022-10-18
Payer: COMMERCIAL

## 2022-10-18 DIAGNOSIS — M17.12 PRIMARY OSTEOARTHRITIS OF LEFT KNEE: ICD-10-CM

## 2022-10-18 DIAGNOSIS — M25.562 ACUTE PAIN OF LEFT KNEE: ICD-10-CM

## 2022-10-18 DIAGNOSIS — S89.92XD LEFT KNEE INJURY, SUBSEQUENT ENCOUNTER: Primary | ICD-10-CM

## 2022-10-18 PROCEDURE — 97110 THERAPEUTIC EXERCISES: CPT

## 2022-10-18 PROCEDURE — 97140 MANUAL THERAPY 1/> REGIONS: CPT

## 2022-10-18 NOTE — PROGRESS NOTES
Daily Note     Today's date: 10/18/2022  Patient name: Isaiah Montenegro  : 1985  MRN: [id number]  Referring provider: Melquiades Dhillon MD  Dx:   Encounter Diagnosis     ICD-10-CM    1  Left knee injury, subsequent encounter  S89  92XD    2  Primary osteoarthritis of left knee  M17 12    3  Acute pain of left knee  M25 562                   Subjective: Pt reported that she using her dynasplint as per protocol  She does feel like her knee flexion is increasing  Objective: See treatment diary below      Assessment: Tolerated treatment well  Notably more L knee flexion during manuals today  No adverse affect to IASTM post Tx  Patient demonstrated fatigue post treatment, exhibited good technique with therapeutic exercises and would benefit from continued PT      Plan: Continue per plan of care  Progress treatment as tolerated         Precautions: h/o brain aneurysm and brain surgery  POC exp 10/18/22  Manuals 9/29 10/4 10-6 10/13 10/18   L knee stretching flex/ext KG TM JG ML TM   STM to L quad, patellar borders KG IASTM to quad  TM IASTM to quad  JG and massage roller while in flexion NP resume NV TM   Patellar glides KG TM JG, grade IV ML, grade IV TM           Neuro Re-Ed        Quad sets 5" x20 5" x20 NV 5" x20 5" x20   Biodex weight shifting Resume NV                                               Ther Ex        NuStep  AAROM on upright bike x10 min AAROM on upright bike x10 min AAROM on upright bike x10 min AAROM on upright bike  10 mins AAROM on upright bike x10 min    Seat 17   Seated knee flexion AAROM  10"x10 w/self overpressure 10"x10 w/self overpressure 10 x 10" overpressure  10" x10   Seated heel slides with self OP Supine w/ strap 5' x20 Supine w/ strap 5' x20 NV Supine w/strap 5" x 20 Supine w/strap 5" x 20   Calf stretch DC       HS stretch DC       SLR hip abduction 1# sidelying 3x10 1# sidelying 3x10 1# sidelying 3x10 NP resume NV 2#   sidelying  3x10   SLR in Flex 1# 3x10 1# 3x10 1# sidelying 3x10 NP resume NV 2# 3x10   Standing HR/TR Resume NV x30 NV NP resume NV    Lunges onto step Resume NV 10" x10 "C" step NV NP resume NV 10" x10 "C" step   Leg press 100# 3x10 100# 3x10 NV NP resume # 3x10   Low mat table to step transfer  "B+C" 10" x10 "B+C" 10" x10 "B+C" 10" x10 NP resume NV "A+B" 10" x10   HS curls        Seated leg ext machine LLLD knee flexion stretch   2 5 mins, 5# NP resume NV            Gait Training        Level surfaces          Stair training        Modalities        CP post tx prn   8 mins     Estim for pain control prn

## 2022-10-20 ENCOUNTER — OFFICE VISIT (OUTPATIENT)
Dept: PHYSICAL THERAPY | Facility: CLINIC | Age: 37
End: 2022-10-20
Payer: COMMERCIAL

## 2022-10-20 DIAGNOSIS — S89.92XD LEFT KNEE INJURY, SUBSEQUENT ENCOUNTER: Primary | ICD-10-CM

## 2022-10-20 DIAGNOSIS — M25.562 ACUTE PAIN OF LEFT KNEE: ICD-10-CM

## 2022-10-20 DIAGNOSIS — M17.12 PRIMARY OSTEOARTHRITIS OF LEFT KNEE: ICD-10-CM

## 2022-10-20 PROCEDURE — 97140 MANUAL THERAPY 1/> REGIONS: CPT

## 2022-10-20 PROCEDURE — 97110 THERAPEUTIC EXERCISES: CPT

## 2022-10-20 NOTE — PROGRESS NOTES
Daily Note     Today's date: 10/20/2022  Patient name: Adele Macdonald  : 1985  MRN: [id number]  Referring provider: Edin Bran MD  Dx:   Encounter Diagnosis     ICD-10-CM    1  Left knee injury, subsequent encounter  S89  92XD    2  Primary osteoarthritis of left knee  M17 12    3  Acute pain of left knee  M25 562                   Subjective: Pt reported that she continues to be compliant with her HEP and dynasplint  She does feel like her L knee is slowly getting better, but she gets very fatigue and limps by the end of her work day  Objective: See treatment diary below      Assessment: Tolerated treatment well  She continues to increase in L knee flexion  Patient demonstrated fatigue post treatment, exhibited good technique with therapeutic exercises and would benefit from continued PT      Plan: Continue per plan of care  Progress treatment as tolerated         Precautions: h/o brain aneurysm and brain surgery  POC exp 10/18/22  Manuals 10/20 10/4 10-6 10/13 10/18   L knee stretching flex/ext TM TM JG ML TM   STM to L quad, patellar borders TM IASTM to quad  TM IASTM to quad  JG and massage roller while in flexion NP resume NV TM   Patellar glides TM TM JG, grade IV ML, grade IV TM           Neuro Re-Ed        Quad sets 5" x20 5" x20 NV 5" x20 5" x20   Biodex weight shifting                                                Ther Ex        NuStep  AAROM on upright bike x10 min    Seat 17 AAROM on upright bike x10 min AAROM on upright bike x10 min AAROM on upright bike  10 mins AAROM on upright bike x10 min    Seat 17   Seated knee flexion AAROM  10"x10 w/self overpressure 10"x10 w/self overpressure 10 x 10" overpressure  10" x10   Seated heel slides with self OP Supine w/ strap 5' x20 Supine w/ strap 5' x20 NV Supine w/strap 5" x 20 Supine w/strap 5" x 20   Calf stretch DC       HS stretch DC       SLR hip abduction 2# sidelying 3x10 1# sidelying 3x10 1# sidelying 3x10 NP resume NV 2# sidelying  3x10   SLR in Flex 2# 3x10 1# 3x10 1# sidelying 3x10 NP resume NV 2# 3x10   Standing HR/TR  x30 NV NP resume NV    Lunges onto step 10" x10 second step 10" x10 "C" step NV NP resume NV 10" x10 "C" step   Leg press 110# 3x10 100# 3x10 NV NP resume # 3x10   Low mat table to step transfer  "B+A" 10" x10 "B+C" 10" x10 "B+C" 10" x10 NP resume NV "A+B" 10" x10   HS curls        Seated leg ext machine LLLD knee flexion stretch   2 5 mins, 5# NP resume NV            Gait Training        Level surfaces          Stair training        Modalities        CP post tx prn   8 mins     Estim for pain control prn

## 2022-10-25 ENCOUNTER — OFFICE VISIT (OUTPATIENT)
Dept: OBGYN CLINIC | Facility: CLINIC | Age: 37
End: 2022-10-25
Payer: COMMERCIAL

## 2022-10-25 ENCOUNTER — OFFICE VISIT (OUTPATIENT)
Dept: PHYSICAL THERAPY | Facility: CLINIC | Age: 37
End: 2022-10-25
Payer: COMMERCIAL

## 2022-10-25 VITALS
HEART RATE: 89 BPM | SYSTOLIC BLOOD PRESSURE: 146 MMHG | BODY MASS INDEX: 30.13 KG/M2 | DIASTOLIC BLOOD PRESSURE: 87 MMHG | HEIGHT: 69 IN

## 2022-10-25 DIAGNOSIS — M24.662 FIBROSIS OF LEFT KNEE JOINT: Primary | ICD-10-CM

## 2022-10-25 DIAGNOSIS — M17.12 PRIMARY OSTEOARTHRITIS OF LEFT KNEE: ICD-10-CM

## 2022-10-25 DIAGNOSIS — S83.005D: ICD-10-CM

## 2022-10-25 DIAGNOSIS — S89.92XD LEFT KNEE INJURY, SUBSEQUENT ENCOUNTER: Primary | ICD-10-CM

## 2022-10-25 DIAGNOSIS — S89.92XD LEFT KNEE INJURY, SUBSEQUENT ENCOUNTER: ICD-10-CM

## 2022-10-25 DIAGNOSIS — M25.562 ACUTE PAIN OF LEFT KNEE: ICD-10-CM

## 2022-10-25 PROCEDURE — 99213 OFFICE O/P EST LOW 20 MIN: CPT | Performed by: ORTHOPAEDIC SURGERY

## 2022-10-25 PROCEDURE — 97110 THERAPEUTIC EXERCISES: CPT

## 2022-10-25 PROCEDURE — 97140 MANUAL THERAPY 1/> REGIONS: CPT

## 2022-10-25 NOTE — PROGRESS NOTES
ASSESSMENT/PLAN:    Diagnoses and all orders for this visit:    Fibrosis of left knee joint    Left knee injury, subsequent encounter    Dislocation, patella closed, left, subsequent encounter        Ada Betancourt is a 44 y o   female who presents with left knee fibrosis and subsequent encounter for patella dislocation  The patient has had great improvement with physical therapy  At this time she should continue physical therapy to work on ROM, stretching and strengthening  She would not be a candidate for an arthroscopic procedure at this time due to her improvements  I will see her back in 2 months for repeat evaluation  Return in about 2 months (around 12/25/2022) for Recheck  The patient's range of motion has dramatically improved  No effusion present  No ligament dysfunction  Would encourage injections much as possible  Continue therapy till she plateaus  Follow-up in 2 months for strength and motion check  If her condition changes, she will not hesitate to let us know    _____________________________________________________  CHIEF COMPLAINT:  Chief Complaint   Patient presents with   • Left Knee - Follow-up         SUBJECTIVE:  Ada Betancourt is a 44 y o  female who presents to the office for re-evaluation of left knee fibrosis and subsequent encounter for patella dislocation  Patient reports she has better motion       The following portions of the patient's history were reviewed and updated as appropriate: allergies, current medications, past family history, past medical history, past social history, past surgical history and problem list     PAST MEDICAL HISTORY:  Past Medical History:   Diagnosis Date   • Brain aneurysm        PAST SURGICAL HISTORY:  Past Surgical History:   Procedure Laterality Date   • BRAIN SURGERY         FAMILY HISTORY:  Family History   Problem Relation Age of Onset   • Aneurysm Mother    • Heart attack Mother    • Schizophrenia Father    • Diabetes Father        SOCIAL HISTORY:  Social History     Tobacco Use   • Smoking status: Never Smoker   • Smokeless tobacco: Never Used   Vaping Use   • Vaping Use: Never used   Substance Use Topics   • Alcohol use: Never   • Drug use: Never       MEDICATIONS:  No current outpatient medications on file  ALLERGIES:  No Known Allergies    ROS:  Review of Systems   Constitutional: Negative for appetite change and unexpected weight change  HENT: Negative for congestion and trouble swallowing  Eyes: Negative for visual disturbance  Respiratory: Negative for cough and shortness of breath  Cardiovascular: Negative for chest pain and palpitations  Gastrointestinal: Negative for nausea and vomiting  Endocrine: Negative for cold intolerance and heat intolerance  Musculoskeletal: Positive for arthralgias and gait problem  Negative for joint swelling and myalgias  Skin: Negative for rash  Neurological: Negative for numbness  Constitutional: Negative for fatigue, fever or loss of appetite  HENT: Negative  Respiratory: Negative for shortness of breath, dyspnea  Cardiovascular: Negative for chest pain/tightness  Gastrointestinal: Negative for abdominal pain, N/V  Endocrine: Negative for cold/heat intolerance, unexplained weight loss/gain  Genitourinary: Negative for flank pain, dysuria, hematuria  Musculoskeletal: Positive for arthralgia   Skin: Negative for rash  Neurological: Negative for numbness or tingling  Psychiatric/Behavioral: Negative for agitation  _____________________________________________________  PHYSICAL EXAMINATION:    Blood pressure 146/87, pulse 89, height 5' 9" (1 753 m)  Constitutional: Oriented to person, place, and time  Appears well-developed and well-nourished  No distress  HENT:   Head: Normocephalic  Eyes: Conjunctivae are normal  Right eye exhibits no discharge  Left eye exhibits no discharge  No scleral icterus  Cardiovascular: Normal rate      Pulmonary/Chest: Effort normal    Neurological: Alert and oriented to person, place, and time  Skin: Skin is warm and dry  No rash noted  Not diaphoretic  No erythema  No pallor  Psychiatric: Normal mood and affect  Behavior is normal  Judgment and thought content normal       MUSCULOSKELETAL EXAMINATION:   Physical Exam  Vitals and nursing note reviewed  HENT:      Head: Normocephalic and atraumatic  Eyes:      General: No scleral icterus  Conjunctiva/sclera: Conjunctivae normal    Cardiovascular:      Rate and Rhythm: Normal rate  Pulmonary:      Effort: Pulmonary effort is normal  No respiratory distress  Musculoskeletal:      Cervical back: Normal range of motion and neck supple  Left knee: No effusion  Skin:     General: Skin is warm and dry  Neurological:      Mental Status: She is alert and oriented to person, place, and time  Psychiatric:         Behavior: Behavior normal        Left Knee Exam     Tenderness   The patient is experiencing tenderness in the medial joint line  Range of Motion   Extension: 0   Left knee flexion: 95  Tests   Varus: negative Valgus: negative    Other   Sensation: normal  Pulse: present  Swelling: none  Effusion: no effusion present    Comments:    Patella tracking has improvements with patella glides               Objective:  BP Readings from Last 1 Encounters:   10/25/22 146/87      Wt Readings from Last 1 Encounters:   09/13/22 92 5 kg (204 lb)        BMI:   Estimated body mass index is 30 13 kg/m² as calculated from the following:    Height as of this encounter: 5' 9" (1 753 m)  Weight as of 9/13/22: 92 5 kg (204 lb)  PROCEDURES PERFORMED:  Procedures  None performed       Scribe Attestation    I,:  Tyrone Menendez am acting as a scribe while in the presence of the attending physician :       I,:  Kyung Wise DO personally performed the services described in this documentation    as scribed in my presence :

## 2022-10-25 NOTE — PROGRESS NOTES
Daily Note     Today's date: 10/25/2022  Patient name: Konrad Serrano  : 1985  MRN: [id number]  Referring provider: Justin Beaver MD  Dx:   Encounter Diagnosis     ICD-10-CM    1  Left knee injury, subsequent encounter  S89  92XD    2  Primary osteoarthritis of left knee  M17 12    3  Acute pain of left knee  M25 562                   Subjective: Pt reported that she walked ana this weekend than she has in the past few months  She does report some edema of the L knee  She has been compliant with her HEP and dynasplint program  She is to have a f/u with her ortho later today  Objective: See treatment diary below      Assessment: Tolerated treatment well  She continues to increase in ROM of the L knee  Patient demonstrated fatigue post treatment, exhibited good technique with therapeutic exercises and would benefit from continued PT      Plan: Continue per plan of care  Progress treatment as tolerated         Precautions: h/o brain aneurysm and brain surgery  POC exp 10/18/22  Manuals 10/20 10/25 10-6 10/13 10/18   L knee stretching flex/ext TM TM JG ML TM   STM to L quad, patellar borders TM IASTM to quad  TM IASTM to quad  JG and massage roller while in flexion NP resume NV TM   Patellar glides TM TM JG, grade IV ML, grade IV TM           Neuro Re-Ed        Quad sets 5" x20 5" x20 NV 5" x20 5" x20   Biodex weight shifting                                                Ther Ex        NuStep  AAROM on upright bike x10 min    Seat 17 AAROM on upright bike x10 min AAROM on upright bike x10 min AAROM on upright bike  10 mins AAROM on upright bike x10 min    Seat 17   Seated knee flexion AAROM  10"x10 w/self overpressure 10"x10 w/self overpressure 10 x 10" overpressure  10" x10   Seated heel slides with self OP Supine w/ strap 5' x20 Supine w/ strap 5' x20 NV Supine w/strap 5" x 20 Supine w/strap 5" x 20   Calf stretch DC       HS stretch DC       SLR hip abduction 2# sidelying 3x10 2# sidelying 3x10 1# sidelying 3x10 NP resume NV 2#   sidelying  3x10   SLR in Flex 2# 3x10 2# 3x10 1# sidelying 3x10 NP resume NV 2# 3x10   Standing HR/TR   NV NP resume NV    Lunges onto step 10" x10 second step 10" x10 "C" step NV NP resume NV 10" x10 "C" step   Leg press 110# 3x10 120# 3x10 NV NP resume # 3x10   Low mat table to step transfer  "B+A" 10" x10 "B+A" 10" x10 "B+C" 10" x10 NP resume NV "A+B" 10" x10   HS curls        Seated leg ext machine LLLD knee flexion stretch   2 5 mins, 5# NP resume NV            Gait Training        Level surfaces          Stair training        Modalities        CP post tx prn   8 mins     Estim for pain control prn

## 2022-10-27 ENCOUNTER — OFFICE VISIT (OUTPATIENT)
Dept: PHYSICAL THERAPY | Facility: CLINIC | Age: 37
End: 2022-10-27
Payer: COMMERCIAL

## 2022-10-27 DIAGNOSIS — M25.562 ACUTE PAIN OF LEFT KNEE: ICD-10-CM

## 2022-10-27 DIAGNOSIS — S89.92XD LEFT KNEE INJURY, SUBSEQUENT ENCOUNTER: Primary | ICD-10-CM

## 2022-10-27 DIAGNOSIS — M17.12 PRIMARY OSTEOARTHRITIS OF LEFT KNEE: ICD-10-CM

## 2022-10-27 PROCEDURE — 97110 THERAPEUTIC EXERCISES: CPT

## 2022-10-27 PROCEDURE — 97140 MANUAL THERAPY 1/> REGIONS: CPT

## 2022-10-27 NOTE — PROGRESS NOTES
Daily Note     Today's date: 10/27/2022  Patient name: Kam Goldstein  : 1985  MRN: [id number]  Referring provider: Milagros Gilmore MD  Dx:   Encounter Diagnosis     ICD-10-CM    1  Left knee injury, subsequent encounter  S89  92XD    2  Primary osteoarthritis of left knee  M17 12    3  Acute pain of left knee  M25 562                   Subjective: Pt reported that she continues to  Get fatigue easily when at work, but she does feel her motion is increasing  She did have a f/u with her ortho  She will be seen again in December by the ortho  Objective: See treatment diary below      Assessment: Increased weight on the leg press and SLR to facilitate strength  Tolerated treatment well  Patient demonstrated fatigue post treatment, exhibited good technique with therapeutic exercises and would benefit from continued PT      Plan: Continue per plan of care  Progress treatment as tolerated         Precautions: h/o brain aneurysm and brain surgery  POC exp 10/18/22  Manuals 10/20 10/25 10/27 10/13 10/18   L knee stretching flex/ext TM TM TM ML TM   STM to L quad, patellar borders TM IASTM to quad  TM IASTM to quad  TM NP resume NV TM   Patellar glides TM TM TM ML, grade IV TM           Neuro Re-Ed        Quad sets 5" x20 5" x20 5" x20 5" x20 5" x20   Biodex weight shifting                                                Ther Ex        NuStep  AAROM on upright bike x10 min    Seat 17 AAROM on upright bike x10 min AAROM on upright bike x10 min AAROM on upright bike  10 mins AAROM on upright bike x10 min    Seat 17   Seated knee flexion AAROM  10"x10 w/self overpressure 10"x10 w/self overpressure 20 x 10" overpressure  10" x10   Seated heel slides with self OP Supine w/ strap 5' x20 Supine w/ strap 5' x20 Supine w/ strap 5' x20 Supine w/strap 5" x 20 Supine w/strap 5" x 20   Calf stretch DC       HS stretch DC       SLR hip abduction 2# sidelying 3x10 2# sidelying 3x10 3# sidelying 3x10 NP resume NV 2# sidelying  3x10   SLR in Flex 2# 3x10 2# 3x10 3# 3x10 NP resume NV 2# 3x10   Standing HR/TR    NP resume NV    Lunges onto step 10" x10 second step 10" x10 "C" step 10" x20 "C" step NP resume NV 10" x10 "C" step   Leg press 110# 3x10 120# 3x10 130# 3x10 NP resume # 3x10   Low mat table to step transfer  "B+A" 10" x10 "B+A" 10" x10 "B+A" 10" x10 NP resume NV "A+B" 10" x10   HS curls        Seated leg ext machine LLLD knee flexion stretch    NP resume NV            Gait Training        Level surfaces          Stair training        Modalities        CP post tx prn        Estim for pain control prn

## 2022-11-01 ENCOUNTER — APPOINTMENT (OUTPATIENT)
Dept: PHYSICAL THERAPY | Facility: CLINIC | Age: 37
End: 2022-11-01

## 2022-11-03 ENCOUNTER — APPOINTMENT (OUTPATIENT)
Dept: PHYSICAL THERAPY | Facility: CLINIC | Age: 37
End: 2022-11-03

## 2022-11-08 ENCOUNTER — OFFICE VISIT (OUTPATIENT)
Dept: PHYSICAL THERAPY | Facility: CLINIC | Age: 37
End: 2022-11-08

## 2022-11-08 DIAGNOSIS — M17.12 PRIMARY OSTEOARTHRITIS OF LEFT KNEE: ICD-10-CM

## 2022-11-08 DIAGNOSIS — M25.562 ACUTE PAIN OF LEFT KNEE: Primary | ICD-10-CM

## 2022-11-08 DIAGNOSIS — S89.92XD LEFT KNEE INJURY, SUBSEQUENT ENCOUNTER: ICD-10-CM

## 2022-11-08 NOTE — PROGRESS NOTES
Daily Note     Today's date: 2022  Patient name: Lazara Sampson  : 1985  MRN: [id number]  Referring provider: Alexi To MD  Dx:   Encounter Diagnosis     ICD-10-CM    1  Acute pain of left knee  M25 562    2  Primary osteoarthritis of left knee  M17 12    3  Left knee injury, subsequent encounter  S89  92XD                   Subjective: Pt rpeorts she was unable to attend visits last week due to childcare; however, ordered ankle weights and rode her bike at home  Pt had follow up with MD who was very pleased with her progress and would like her to continue with PT until she plateaus  Objective: See treatment diary below      Assessment: Pt continues to be challenged by current POC and is making good progress with knee flexion throughout session  Pt was able to make full revolution on the upright bike this date for the first time  Tolerated treatment well  Patient demonstrated fatigue post treatment, exhibited good technique with therapeutic exercises and would benefit from continued PT      Plan: Progress treatment as tolerated  Precautions: h/o brain aneurysm and brain surgery  POC exp 10/18/22  Manuals 10/20 10/25 10/27 11-8 10/18   L knee stretching flex/ext TM TM TM JG TM   STM to L quad, patellar borders TM IASTM to quad  TM IASTM to quad  TM IASTM to quad  JG TM   Patellar glides TM TM TM JG TM           Neuro Re-Ed        Quad sets 5" x20 5" x20 5" x20 20 x 5" 5" x20   Biodex weight shifting                                                Ther Ex        NuStep  AAROM on upright bike x10 min    Seat 17 AAROM on upright bike x10 min AAROM on upright bike x10 min  AAROM on upright bike x10 min    Seat 17   Upright Bike    10 mins, L1  Seat 16 (full rotation! ! )    Seated knee flexion AAROM  10"x10 w/self overpressure 10"x10 w/self overpressure 20 x 10" overpressure 20 x 10" overpressure 10" x10   Seated heel slides with self OP Supine w/ strap 5' x20 Supine w/ strap 5' x20 Supine w/ strap 5' x20 NV Supine w/strap 5" x 20   Calf stretch DC       HS stretch DC       SLR hip abduction 2# sidelying 3x10 2# sidelying 3x10 3# sidelying 3x10 3# sidelying 3x10 2#   sidelying  3x10   SLR in Flex 2# 3x10 2# 3x10 3# 3x10 3# 3x10 2# 3x10   Standing HR/TR        Lunges onto step 10" x10 second step 10" x10 "C" step 10" x20 "C" step 10" x20 "C" step 10" x10 "C" step   Leg press 110# 3x10 120# 3x10 130# 3x10 130# 3x10 110# 3x10   Low mat table to step transfer  "B+A" 10" x10 "B+A" 10" x10 "B+A" 10" x10 "B+A" 10" x10 "A+B" 10" x10   HS curls        Seated leg ext machine LLLD knee flexion stretch                Gait Training        Level surfaces          Stair training        Modalities        CP post tx prn        Estim for pain control prn

## 2022-11-10 ENCOUNTER — EVALUATION (OUTPATIENT)
Dept: PHYSICAL THERAPY | Facility: CLINIC | Age: 37
End: 2022-11-10

## 2022-11-10 DIAGNOSIS — M17.12 PRIMARY OSTEOARTHRITIS OF LEFT KNEE: ICD-10-CM

## 2022-11-10 DIAGNOSIS — S89.92XD LEFT KNEE INJURY, SUBSEQUENT ENCOUNTER: ICD-10-CM

## 2022-11-10 DIAGNOSIS — M25.562 ACUTE PAIN OF LEFT KNEE: Primary | ICD-10-CM

## 2022-11-10 NOTE — PROGRESS NOTES
PT Progress Note     Today's date: 11/10/2022  Patient name: Isaiah Mcgee  : 1985  MRN: [id number]  Referring provider: Emelyn Reyes MD  Dx:   Encounter Diagnosis     ICD-10-CM    1  Acute pain of left knee  M25 562    2  Primary osteoarthritis of left knee  M17 12    3  Left knee injury, subsequent encounter  S89  92XD             Assessment  Assessment details: Isaiah Mcgee is a 44 y o  female who presents with complaints of acute pain of left knee following left knee injury in the beginning of May  Imaging reveals fraying of medial meniscus  Patient is referred to outpatient PT by ortho MD Dr Glen Herring  No further referral appears necessary at this time based upon examination results  Patient's functional level is severely limited by her impairments listed  She will benefit from OPPT services to reduce swelling and pain, restore ROM and strength, and to maximize function, safety and QOL  Prognosis is good given HEP compliance and PT 2-3x/wk  Please contact me if you have any questions or recommendations  Thank you for the opportunity to share in  [facility]  RA      Isaiah Mcgee has demonstrated decreased pain, increased strength, increased range of motion, and increased activity tolerance since starting physical therapy services  She reports an improvement an overall improvement of 60% thus far  Patient is scheduled for left knee arthroscopy on  with Dr Dahlia Bauer  Continues to use B/L axillary crutches for ambulation  Difficulties continue with stair negotiation and ambulation  She continues to present with pain, decreased strength, decreased range of motion, and decreased activity tolerance and would benefit from additional skilled physical therapy interventions to address impairments and maximize function  RA 10/13/22: The patient has been seen in PT for a total of 19 visits since [facility]  She has made improvements since her last re-eval and continues to make progress towards her goals  She has complaints of intermittent pain with most pain being along her medial knee  She demonstrates deficits with decreased A/PROM and strength, decreased activity tolerance, antalgic gait with use of AD, difficulty with stair negotiation and pain with completing her ADLs and tasks at home  She ambulates with use of crutches for household and community distances but recently has started to walk short distances without them  She is now able to go up and down the steps with non-reciprocal gait pattern  She was fitted for Dynasplint today to help her with knee flexion  The patient would benefit from continued PT to address deficits and improve function  Tx to include ROM, stretching, strengthening, modalities, HEP, pt education, postural ed, lifting/body mechanics, neuro re-ed, balance/proprioception Te, MT and equipment  Plan to continue with PT until her next doctor appointment and will progress as able in upcoming visits with ROM, stretching, strengthening, modalities, HEP, flexibility, gait and balance  RA 11/10       Alejandra Medina has demonstrated decreased pain, increased strength, increased range of motion, and increased activity tolerance since starting physical therapy services  She reports an overall improvement of 90% thus far  She continues  to present with pain, decreased strength, decreased range of motion, and decreased activity tolerance and would benefit from additional skilled physical therapy interventions to address impairments and maximize function          Impairments: abnormal gait, abnormal muscle firing, abnormal or restricted ROM, abnormal movement, activity intolerance, impaired balance, impaired physical strength, lacks appropriate home exercise program, pain with function and weight-bearing intolerance  Functional limitations: unable to tolerate WB through L LE, ambulating with crutches and WBAT LLE, difficulty with transfer from sit->stand, unable to drive to work, difficulty with stairs,requires assistance of spouse for stairs, unable to participate in recreation (hiking)Understanding of Dx/Px/POC: good   Prognosis: good    Goals  STGs  1) IN 4 weeks patient will demonstrate improved L knee ROM to 90 deg or more flexion and terminal knee extension to improve functional mobility  Met   2) In 4 weeks patient will demonstrate improved quad strength noted with fair to good quad set for purpose of gaining control of L knee during functional WB activity  Met   3) In 4 weeks patient will tolerate ambulation with crutches and place 50% of weight through through LLE  Met     LTGs  1) In 6-8 weeks patient will tolerate FWB through LLE, able to ambulate level surfaces without AD  Met    2) IN 6-8 weeks patient will demonstrate independence with stair climbing using handrails but no AD  Progressing   3) In 4-8 weeks patient will tolerate driving to/from work to return to her usual work schedule  Progressing   4) In 8-12 weeks patient will be able to resume outdoor recreational activity including walking/hiking    Progressing     Plan  Patient would benefit from: skilled physical therapy  Referral necessary: No  Planned modality interventions: cryotherapy, TENS and unattended electrical stimulation  Planned therapy interventions: joint mobilization, manual therapy, massage, Miller taping, balance, balance/weight bearing training, muscle pump exercises, neuromuscular re-education, patient education, postural training, self care, strengthening, stretching, therapeutic activities, therapeutic exercise, flexibility, functional ROM exercises, gait training and home exercise program  Frequency: 2x week  Duration in weeks: 12  Plan of Care beginning date: 10/13/2022  Plan of Care expiration date: 1/5/23  Treatment plan discussed with: patient        Subjective Evaluation    History of Present Illness  Date of onset: 5/8/2022  Mechanism of injury: Patient presents to PT with c/o L knee pain from an injury that occurred on Mother's Day  She was walking on a trail, went to jump out of the way of a puddle and her L knee gave out to the side  She experienced severe pain and swelling immediately  She was seen in the ER  She was provided with a knee immobilizer and had x-rays  X-rays were neg for fx  Patient could not have MRI secondary to implants in brain from brain surgery for aneurysm, but was able to have CT with contrast which showed fraying of the meniscus  She has been non-WB since the time of the injury  She is using crutches  She c/o severe sharp pain on the medial aspect of her knee whenever she attempts to WB through the L LE  She has fallen 2x since being on crutches  She reports hurting her L ankle with one of the falls, but had an x-ray and this was also neg for fx  She c/o swelling in the ankle and pins and needles in the ankle at night  Had injection from Dr Diana Dugan last week which felt a little better that day, but no longer feels much benefit from the injection  UPDATE 10/13/22: The patient states that her pain is intermittent  Pain when present is along her medial knee  Notes no pain at rest with her knee straight  Feels her knee has been bending better  She did see the Dynasplint rep today and was fitted for a brace to help bend her knee  She will be going back to see the doctor on 10/25/22 or her follow up appointment, possibly will schedule her knee scope at that appointment      Pain     RA 9   RA 10/13  RA 11/10   At best pain ratin   0   0/10 best  0 best    At worst pain rating: 10  10 shooting pain  10/10 worst   10 worst   Quality: dull ache and sharp   Relieving factors: ice    Social Support  Stairs in house: yes   Lives in: multiple-level home  Lives with: young children and spouse    Employment status: working ( for [employer])    Diagnostic Tests  X-ray: normal  CT scan: abnormal  Treatments  No previous or current treatments  Patient Goals  Patient goals for therapy: decreased pain, return to sport/leisure activities, return to work, improved balance, increased motion, decreased edema, increased strength and independence with ADLs/IADLs  Patient goal: return to walking, driving, return to work full time        Objective     Observations   Left Knee   Positive for atrophy and edema  Additional Observation Details  (+) atrophy L quad and L gastroc - persists    Tenderness   Left Knee   Tenderness in the inferior patella, lateral joint line, lateral patella, medial joint line, medial patella, patellar tendon and superior patella  Active Range of Motion   Left Knee      RA 9/1        RA 10/13:    RA 11/10   Flexion: 30 degrees with pain   40 deg        80 deg/82 deg will OP                 108 deg   Extension: -20 degrees with pain  -2 deg         0 deg    0 deg   Left Ankle/Foot   Dorsiflexion (ke): WFL    Strength/Myotome Testing     Left Knee   Quadriceps contraction: fair     RA 9/1   Able to perform Supine SLR     Tests     Additional Tests Details  Patient too guarded to perform special testing  Able to perform Supine SLR flexion independently with with c/o L hip flexor pain/pulling    Ambulation   Weight-Bearing Status   Weight-Bearing Status (Left): weight-bearing as tolerated   Assistive device used: one crutch for household and community distances - has now started to try to walk without it  Additional Weight-Bearing Status Details  Patient is WBAT    Ambulation: Stairs     Additional Stairs Ambulation Details  Has been going up and down the steps with non-reciprocal gait pattern           Precautions: h/o brain aneurysm and brain surgery  POC exp 10/18/22  Manuals 10/20 10/25 10/27 11-8 11/10    L knee stretching flex/ext TM TM TM JG RR   STM to L quad, patellar borders TM IASTM to quad  TM IASTM to quad  TM IASTM to quad  JG RR   Patellar glides TM TM TM JG RR           Neuro Re-Ed        Quad sets 5" x20 5" x20 5" x20 20 x 5" 5" x20   Biodex weight shifting                                                Ther Ex        NuStep  AAROM on upright bike x10 min    Seat 17 AAROM on upright bike x10 min AAROM on upright bike x10 min  AAROM on upright bike x10 min    Seat 17   Upright Bike    10 mins, L1  Seat 16 (full rotation! ! )    Seated knee flexion AAROM  10"x10 w/self overpressure 10"x10 w/self overpressure 20 x 10" overpressure 20 x 10" overpressure 10" x10   Seated heel slides with self OP Supine w/ strap 5' x20 Supine w/ strap 5' x20 Supine w/ strap 5' x20 NV Supine w/strap 5" x 20   Calf stretch DC       HS stretch DC       SLR hip abduction 2# sidelying 3x10 2# sidelying 3x10 3# sidelying 3x10 3# sidelying 3x10 2#   sidelying  3x10   SLR in Flex 2# 3x10 2# 3x10 3# 3x10 3# 3x10 2# 3x10   Standing HR/TR        Lunges onto step 10" x10 second step 10" x10 "C" step 10" x20 "C" step 10" x20 "C" step 10" x10 "C" step   Leg press 110# 3x10 120# 3x10 130# 3x10 130# 3x10 130# 3x10   Low mat table to step transfer  "B+A" 10" x10 "B+A" 10" x10 "B+A" 10" x10 "B+A" 10" x10 "A+B" 10" x10   HS curls        Seated leg ext machine LLLD knee flexion stretch                Gait Training        Level surfaces          Stair training        Modalities        CP post tx prn        Estim for pain control prn

## 2022-11-16 NOTE — PROGRESS NOTES
Daily Note     Today's date: 2022  Patient name: Lee Wilkinson  : 1985  MRN: [id number]  Referring provider: Margo Hagen MD  Dx:   Encounter Diagnosis     ICD-10-CM    1  Acute pain of left knee  M25 562       2  Primary osteoarthritis of left knee  M17 12       3  Left knee injury, subsequent encounter  S89  92XD                      Subjective:       Objective: See treatment diary below      Assessment: Tolerated treatment {Tolerated treatment :[id number]}  Patient {assessment:[id number]}      Plan: Continue per plan of care  Precautions: h/o brain aneurysm and brain surgery  POC exp 10/18/22  Manuals 10/20 10/25 10/27 11-8    L knee stretching flex/ext TM TM TM JG ML   STM to L quad, patellar borders TM IASTM to quad  TM IASTM to quad  TM IASTM to quad  JG IASTM to quad ML   Patellar glides TM TM TM JG ML           Neuro Re-Ed        Quad sets 5" x20 5" x20 5" x20 20 x 5" 5" x20   Biodex weight shifting                                                Ther Ex        NuStep  AAROM on upright bike x10 min    Seat 17 AAROM on upright bike x10 min AAROM on upright bike x10 min     Upright Bike    10 mins, L1  Seat 16 (full rotation! ! ) 10 mins, L1  Seat 16   Seated knee flexion AAROM  10"x10 w/self overpressure 10"x10 w/self overpressure 20 x 10" overpressure 20 x 10" overpressure 20 x 10" overpressure   Seated heel slides with self OP Supine w/ strap 5' x20 Supine w/ strap 5' x20 Supine w/ strap 5' x20 NV Supine w/strap 5" x 20   Calf stretch DC       HS stretch DC       SLR hip abduction 2# sidelying 3x10 2# sidelying 3x10 3# sidelying 3x10 3# sidelying 3x10 3# sidelying  3x10   SLR in Flex 2# 3x10 2# 3x10 3# 3x10 3# 3x10 3# 3x10   Standing HR/TR        Lunges onto step 10" x10 second step 10" x10 "C" step 10" x20 "C" step 10" x20 "C" step 10" x20 "C" step   Leg press 110# 3x10 120# 3x10 130# 3x10 130# 3x10 130# 3x10   Low mat table to step transfer  "B+A" 10" x10 "B+A" 10" x10 "B+A" 10" x10 "B+A" 10" x10 "B+A" 10" x10   HS curls        Seated leg ext machine LLLD knee flexion stretch                Gait Training        Level surfaces          Stair training        Modalities        CP post tx prn        Estim for pain control prn

## 2022-11-17 ENCOUNTER — OFFICE VISIT (OUTPATIENT)
Dept: PHYSICAL THERAPY | Facility: CLINIC | Age: 37
End: 2022-11-17

## 2022-11-17 DIAGNOSIS — M25.562 ACUTE PAIN OF LEFT KNEE: Primary | ICD-10-CM

## 2022-11-17 DIAGNOSIS — S89.92XD LEFT KNEE INJURY, SUBSEQUENT ENCOUNTER: ICD-10-CM

## 2022-11-17 DIAGNOSIS — M17.12 PRIMARY OSTEOARTHRITIS OF LEFT KNEE: ICD-10-CM

## 2022-11-17 NOTE — PROGRESS NOTES
Daily Note     Today's date: 2022  Patient name: Juany Cruz  : 1985  MRN: [id number]  Referring provider: Sun Mathias MD  Dx:   Encounter Diagnosis     ICD-10-CM    1  Acute pain of left knee  M25 562       2  Primary osteoarthritis of left knee  M17 12       3  Left knee injury, subsequent encounter  S89  92XD                      Subjective: Pt reported that she did a lot of walking on the beach while on vacation last week  She also had to climb a few flights of stairs everyday, and had no issue  Objective: See treatment diary below      Assessment: Gait training performed on TMill w/ cues on stride length and heel strike  Tolerated treatment well  Patient demonstrated fatigue post treatment, exhibited good technique with therapeutic exercises and would benefit from continued PT      Plan: Continue per plan of care  Progress treatment as tolerated  Precautions: h/o brain aneurysm and brain surgery  POC exp 10/18/22  Manuals 11/17 10/25 10/27 11-8 11/10    L knee stretching flex/ext  TM TM JG RR   STM to L quad, patellar borders TM IASTM to quad  TM IASTM to quad   TM IASTM to quad  JG RR   Patellar glides  TM TM JG RR   Prone quad stretch  TM       Neuro Re-Ed        Quad sets  5" x20 5" x20 20 x 5" 5" x20   Biodex weight shifting        LAQ 3# 5" x20                                       Ther Ex        NuStep   AAROM on upright bike x10 min AAROM on upright bike x10 min  AAROM on upright bike x10 min    Seat 17   Upright Bike 10 mins, L3  Seat 15    10 mins, L1  Seat 16 (full rotation! ! )    Seated knee flexion AAROM  DC 10"x10 w/self overpressure 20 x 10" overpressure 20 x 10" overpressure 10" x10   Seated heel slides with self OP DC Supine w/ strap 5' x20 Supine w/ strap 5' x20 NV Supine w/strap 5" x 20   Calf stretch        HS stretch        SLR hip abduction 3# sidelying 3x10 2# sidelying 3x10 3# sidelying 3x10 3# sidelying 3x10 2#   sidelying  3x10   SLR in Flex 3# 3x10 2# 3x10 3# 3x10 3# 3x10 2# 3x10   Standing HR/TR        Lunges onto step 10" x20 second step 10" x10 "C" step 10" x20 "C" step 10" x20 "C" step 10" x10 "C" step   Leg press 140# 3x10 120# 3x10 130# 3x10 130# 3x10 130# 3x10   Low mat table to step transfer  "C" step 10" x10 "B+A" 10" x10 "B+A" 10" x10 "B+A" 10" x10 "A+B" 10" x10   HS curls        Seated leg ext machine LLLD knee flexion stretch                Gait Training        Level surfaces          Stair training        TMill 5 min @2 0 mph with cues on heel strike               Modalities        CP post tx prn        Estim for pain control prn

## 2022-11-22 ENCOUNTER — OFFICE VISIT (OUTPATIENT)
Dept: PHYSICAL THERAPY | Facility: CLINIC | Age: 37
End: 2022-11-22

## 2022-11-22 DIAGNOSIS — M25.562 ACUTE PAIN OF LEFT KNEE: Primary | ICD-10-CM

## 2022-11-22 DIAGNOSIS — S89.92XD LEFT KNEE INJURY, SUBSEQUENT ENCOUNTER: ICD-10-CM

## 2022-11-22 DIAGNOSIS — M17.12 PRIMARY OSTEOARTHRITIS OF LEFT KNEE: ICD-10-CM

## 2022-11-22 NOTE — PROGRESS NOTES
Daily Note     Today's date: 2022  Patient name: Jaye Moritz  : 1985  MRN: [id number]  Referring provider: Chace Moran MD  Dx:   Encounter Diagnosis     ICD-10-CM    1  Acute pain of left knee  M25 562       2  Primary osteoarthritis of left knee  M17 12       3  Left knee injury, subsequent encounter  S89  [id number]                      Subjective: Pt states she has been getting stuff from the attic and that has been making her knee a little sore  Objective: See treatment diary below      Assessment:  Tolerated treatment well with no increase in pain and min to moderate fatigue  Pt required VC with quad contraction to improved TKE  Pt demonstrate improve heels strike with ambulation  Patient would benefit from continued PT      Plan: Continue per plan of care  Progress treatment as tolerated  Precautions: h/o brain aneurysm and brain surgery  POC exp 10/18/22  Manuals   11-8 11/10    L knee stretching flex/ext    JG RR   STM to L quad, patellar borders TM IASTM to quad , CF   IASTM to quad  JG RR   Patellar glides    JG RR   Prone quad stretch  TM CF      Neuro Re-Ed        Quad sets    20 x 5" 5" x20   Biodex weight shifting        LAQ 3# 5" x20 3# 5" x 20                                       Ther Ex        NuStep      AAROM on upright bike x10 min    Seat 17   Upright Bike 10 mins, L3  Seat 15  10 min, L3 seat 15   10 mins, L1  Seat 16 (full rotation! ! )    Seated knee flexion AAROM  DC   20 x 10" overpressure 10" x10   Seated heel slides with self OP DC   NV Supine w/strap 5" x 20   Calf stretch        HS stretch        SLR hip abduction 3# sidelying 3x10 3# sidelying 3 x 10   3# sidelying 3x10 2#   sidelying  3x10   SLR in Flex 3# 3x10 3# 3 x 10   3# 3x10 2# 3x10   Standing HR/TR        Lunges onto step 10" x20 second step 10" x 10" second step   10" x20 "C" step 10" x10 "C" step   Leg press 140# 3x10 1403 3 x 10   130# 3x10 130# 3x10   Low mat table to step transfer "C" step 10" x10 "C" step 10" x10  "B+A" 10" x10 "A+B" 10" x10   HS curls        Seated leg ext machine LLLD knee flexion stretch                Gait Training        Level surfaces        Stair training        TMill 5 min @2 0 mph with cues on heel strike 5 min @2 0 mph with cues on heel strike              Modalities        CP post tx prn        Estim for pain control prn

## 2022-11-25 ENCOUNTER — APPOINTMENT (OUTPATIENT)
Dept: PHYSICAL THERAPY | Facility: CLINIC | Age: 37
End: 2022-11-25

## 2022-11-29 ENCOUNTER — APPOINTMENT (OUTPATIENT)
Dept: PHYSICAL THERAPY | Facility: CLINIC | Age: 37
End: 2022-11-29

## 2023-10-10 ENCOUNTER — OFFICE VISIT (OUTPATIENT)
Dept: URGENT CARE | Facility: CLINIC | Age: 38
End: 2023-10-10
Payer: OTHER MISCELLANEOUS

## 2023-10-10 ENCOUNTER — TELEPHONE (OUTPATIENT)
Dept: URGENT CARE | Facility: CLINIC | Age: 38
End: 2023-10-10

## 2023-10-10 DIAGNOSIS — M54.16 BILATERAL LUMBAR RADICULOPATHY: Primary | ICD-10-CM

## 2023-10-10 DIAGNOSIS — M54.41 ACUTE BILATERAL LOW BACK PAIN WITH BILATERAL SCIATICA: ICD-10-CM

## 2023-10-10 DIAGNOSIS — M54.42 ACUTE BILATERAL LOW BACK PAIN WITH BILATERAL SCIATICA: ICD-10-CM

## 2023-10-10 PROCEDURE — G0384 LEV 5 HOSP TYPE B ED VISIT: HCPCS | Performed by: NURSE PRACTITIONER

## 2023-10-10 PROCEDURE — 99285 EMERGENCY DEPT VISIT HI MDM: CPT | Performed by: NURSE PRACTITIONER

## 2023-10-10 NOTE — PROGRESS NOTES
[facility]    THIS IS A [facility]' COMPENSATION CHART. PT SENT TO THE ED FOR HIGHER LEVEL OF CARE AND EVALUATION AND PAIN MANAGEMENT. This encounter was created for [facility] orders only . NAME: Sivan Ernandez is a 43 y.o. female  : 1985    MRN: [id number]  DATE: October 10, 2023  TIME: 10:24 AM    Assessment and Plan   Bilateral lumbar radiculopathy [M54.16]  1. Bilateral lumbar radiculopathy  Transfer to other facility      2. Acute bilateral low back pain with bilateral sciatica  Transfer to other facility            Patient Instructions       Follow up with PCP in 3-5 days. Proceed to  ER if symptoms worsen. Chief Complaint   No chief complaint on file. History of Present Illness       HPI    Review of Systems   Review of Systems      Current Medications     No current outpatient medications on file. Current Allergies     Allergies as of 10/10/2023   • (No Known Allergies)            The following portions of the patient's history were reviewed and updated as appropriate: allergies, current medications, past family history, past medical history, past social history, past surgical history and problem list.     Past Medical History:   Diagnosis Date   • Brain aneurysm        Past Surgical History:   Procedure Laterality Date   • BRAIN SURGERY         Family History   Problem Relation Age of Onset   • Aneurysm Mother    • Heart attack Mother    • Schizophrenia Father    • Diabetes Father          Medications have been verified. Objective   There were no vitals taken for this visit. No LMP recorded.        Physical Exam     Physical Exam      PDMP portal reviewed as per [facility] [place] [employer] policy       1  Oxycodone-Acetaminophen 5-325 30.00  4  [facility]  [id number]   Thr ()  0  56.25 MME  [place]  [place]    2022   1  Oxycodone-Acetaminophen 5-325 5.00  1  [facility]  [id number]   Thr ()

## 2023-11-10 ENCOUNTER — TELEPHONE (OUTPATIENT)
Age: 38
End: 2023-11-10

## 2023-11-10 NOTE — TELEPHONE ENCOUNTER
Patient called asking to speak to [employer]. Provided number and warm transfer to [facility].

## 2023-11-21 ENCOUNTER — CONSULT (OUTPATIENT)
Dept: PAIN MEDICINE | Facility: CLINIC | Age: 38
End: 2023-11-21
Payer: OTHER MISCELLANEOUS

## 2023-11-21 VITALS
HEIGHT: 70 IN | DIASTOLIC BLOOD PRESSURE: 103 MMHG | WEIGHT: 214.8 LBS | HEART RATE: 92 BPM | SYSTOLIC BLOOD PRESSURE: 155 MMHG | BODY MASS INDEX: 30.75 KG/M2

## 2023-11-21 DIAGNOSIS — M51.36 DDD (DEGENERATIVE DISC DISEASE), LUMBAR: ICD-10-CM

## 2023-11-21 DIAGNOSIS — M54.16 LUMBAR RADICULOPATHY: Primary | ICD-10-CM

## 2023-11-21 PROCEDURE — 99204 OFFICE O/P NEW MOD 45 MIN: CPT | Performed by: STUDENT IN AN ORGANIZED HEALTH CARE EDUCATION/TRAINING PROGRAM

## 2023-11-21 RX ORDER — PREDNISONE 20 MG/1
20 TABLET ORAL DAILY
COMMUNITY
Start: 2023-10-16

## 2023-11-21 RX ORDER — GABAPENTIN 300 MG/1
300 CAPSULE ORAL
COMMUNITY
Start: 2023-11-14

## 2023-11-21 RX ORDER — METHOCARBAMOL 750 MG/1
750 TABLET, FILM COATED ORAL 4 TIMES DAILY PRN
COMMUNITY
Start: 2023-11-06

## 2023-11-21 RX ORDER — ACETAMINOPHEN 500 MG
500 TABLET ORAL EVERY 6 HOURS PRN
COMMUNITY

## 2023-11-21 RX ORDER — MELOXICAM 10 MG/1
CAPSULE ORAL
COMMUNITY

## 2023-11-21 NOTE — LETTER
November 25, 2023     Patient: Josie Zamarripakeeper  YOB: 1985  Date of Visit: 11/21/2023      To Whom it May Concern:    Mabel Pejoaquin is under my professional care. Solomon Palencia was seen in my office on 11/21/2023. Solomon Palencia has not been cleared to return to work. Will reassess at next follow up. If you have any questions or concerns, please don't hesitate to call.        Sincerely,       Samson Merlin, MD

## 2023-11-21 NOTE — PROGRESS NOTES
Assessment:  1. Lumbar radiculopathy    2. DDD (degenerative disc disease), lumbar        Portions of the record may have been created with voice recognition software. Occasional wrong word or "sound a like" substitutions may have occurred due to the inherent limitations of voice recognition software. Read the chart carefully and recognize, using context, where substitutions have occurred. Contact me with any questions. Plan:  34-year-old female here for initial evaluation of over 1 month low back and right greater than left LE pain after an injury at work on 10/2/2023. She has been undergoing physical therapy without significant improvement so far. She notes difficulty with all ambulation and function, denies progressive weakness, saddle anesthesia, bowel/bladder incontinence. Patient notes she is unable to sit for over 10 minutes due to symptoms. Recent CT of lumbar spine reviewed and shows multilevel DDD. Symptoms likely due to lumbar radiculopathy. 1.  Follow-up for interlaminar lumbar epidural steroid injection L5-S1.  2.  Recommend continuing physical therapy/home exercise program.  3.  Follow-up in 1 month after injection. Complete risks and benefits including bleeding, infection, tissue reaction, nerve injury and allergic reaction were discussed. The approach was demonstrated using models and literature was provided. Verbal and written consent was obtained. History of Present Illness: The patient is a 43 y.o. female who presents for consultation in regards to Back Pain, Leg Pain, and Foot Pain. Symptoms have been present since 10/2/23. Symptoms began following an injury at work. Pain is reported to be 8 on the numeric rating scale. Symptoms are felt constantly and worst in the no typical pattern. Symptoms are characterized as shooting, sharp, dull/aching, cutting, numbing, and pressure-like. Symptoms are associated with bilateral leg weakness.   Aggravating factors include bending and sitting. Relieving factors include standing. Review of Systems:    Review of Systems   Constitutional:  Positive for unexpected weight change. Negative for chills, fatigue and fever. HENT:  Negative for hearing loss, sinus pain, sore throat and trouble swallowing. Eyes:  Negative for pain and visual disturbance. Respiratory:  Negative for shortness of breath and wheezing. Cardiovascular:  Negative for chest pain and palpitations. Gastrointestinal:  Negative for abdominal pain, constipation and nausea. Endocrine: Negative for polydipsia and polyuria. Genitourinary:  Negative for difficulty urinating. Musculoskeletal:  Negative for arthralgias, gait problem, joint swelling and myalgias. Skin:  Negative for rash. Neurological:  Positive for dizziness, numbness and headaches. Negative for weakness. Hematological:  Does not bruise/bleed easily. Psychiatric/Behavioral:  Negative for dysphoric mood. The patient is not nervous/anxious. All other systems reviewed and are negative.         Past Medical History:   Diagnosis Date    Brain aneurysm        Past Surgical History:   Procedure Laterality Date    BRAIN SURGERY         Family History   Problem Relation Age of Onset    Aneurysm Mother     Heart attack Mother     Schizophrenia Father     Diabetes Father        Social History     Occupational History    Not on file   Tobacco Use    Smoking status: Never    Smokeless tobacco: Never   Vaping Use    Vaping Use: Never used   Substance and Sexual Activity    Alcohol use: Never    Drug use: Never    Sexual activity: Not on file         Current Outpatient Medications:     acetaminophen (TYLENOL) 500 mg tablet, Take 500 mg by mouth every 6 (six) hours as needed, Disp: , Rfl:     gabapentin (NEURONTIN) 300 mg capsule, Take 300 mg by mouth daily at bedtime, Disp: , Rfl:     Meloxicam 10 MG CAPS, , Disp: , Rfl:     methocarbamol (ROBAXIN) 750 mg tablet, Take 750 mg by mouth 4 (four) times a day as needed, Disp: , Rfl:     predniSONE 20 mg tablet, Take 20 mg by mouth daily, Disp: , Rfl:     No Known Allergies    Physical Exam:    BP (!) 155/103   Pulse 92   Ht 5' 9.5" (1.765 m)   Wt 97.4 kg (214 lb 12.8 oz)   BMI 31.27 kg/m²     Constitutional: normal, well developed, well nourished, alert, in no distress and non-toxic and no overt pain behavior.   Eyes: anicteric  HEENT: grossly intact  Neck: supple, symmetric, trachea midline and no masses   Pulmonary:even and unlabored  Cardiovascular:No edema or pitting edema present  Skin:Normal without rashes or lesions and well hydrated  Psychiatric:Mood and affect appropriate  Neurologic:Cranial Nerves II-XII grossly intact  Musculoskeletal:antalgic gait, limited lumbar ROM w/ flexion, positive slump test over RLE, grossly intact strength and sensation to light touch over BLE    Imaging  FL spine and pain procedure    (Results Pending)     CT Lumbar spine 11/8/23:        Orders Placed This Encounter   Procedures    FL spine and pain procedure

## 2023-12-14 ENCOUNTER — TELEPHONE (OUTPATIENT)
Age: 38
End: 2023-12-14

## 2023-12-18 ENCOUNTER — TELEPHONE (OUTPATIENT)
Dept: PAIN MEDICINE | Facility: CLINIC | Age: 38
End: 2023-12-18

## 2023-12-18 NOTE — TELEPHONE ENCOUNTER
Called pt and asked if she needed to keep appt tomorrow with RD, since her injection isn't until beginning of January, she said that she went to PT and they want to refer her to neuro for eval and need RD to refer her. RD agreed to see her tomorrow.

## 2023-12-19 ENCOUNTER — OFFICE VISIT (OUTPATIENT)
Dept: PAIN MEDICINE | Facility: CLINIC | Age: 38
End: 2023-12-19
Payer: OTHER MISCELLANEOUS

## 2023-12-19 VITALS
DIASTOLIC BLOOD PRESSURE: 113 MMHG | HEART RATE: 79 BPM | HEIGHT: 70 IN | SYSTOLIC BLOOD PRESSURE: 146 MMHG | WEIGHT: 215 LBS | BODY MASS INDEX: 30.78 KG/M2

## 2023-12-19 DIAGNOSIS — M54.16 LUMBAR RADICULOPATHY: Primary | ICD-10-CM

## 2023-12-19 DIAGNOSIS — M51.36 DDD (DEGENERATIVE DISC DISEASE), LUMBAR: ICD-10-CM

## 2023-12-19 PROCEDURE — 99214 OFFICE O/P EST MOD 30 MIN: CPT | Performed by: STUDENT IN AN ORGANIZED HEALTH CARE EDUCATION/TRAINING PROGRAM

## 2023-12-19 NOTE — LETTER
December 20, 2023     Patient: Sandy Greer  YOB: 1985  Date of Visit: 12/19/2023      To Whom it May Concern:    Sandy Greer is under my professional care. Sandy was seen in my office on 12/19/2023.   Sandy has not been cleared to return to work. Will reassess at next follow up.      If you have any questions or concerns, please don't hesitate to call.         Sincerely,       Annia Price MD

## 2023-12-19 NOTE — PROGRESS NOTES
"Assessment:  1. Lumbar radiculopathy    2. DDD (degenerative disc disease), lumbar      Portions of the record may have been created with voice recognition software. Occasional wrong word or \"sound a like\" substitutions may have occurred due to the inherent limitations of voice recognition software. Read the chart carefully and recognize, using context, where substitutions have occurred. Contact me with any questions.       Plan:  38-year-old female here for follow-up visit for low back pain.  Since last visit, patient has been undergoing physical therapy and notes some progression of left foot drop.  Denies saddle anesthesia, bowel/bladder incontinence.    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  Obtain CT myelogram for further evaluation in the setting of low back and LLE pain in L5 distribution with some progressive weakness since last imaging study, failure of conservative treatment thus far.    Continue physical therapy as tolerated.    Scheduled for interlaminar lumbar epidural injection L5-S1 on 1/10/2024.    Reviewed 'red flag' signs/symptoms to monitor for including new weakness, numbness, BBI, urinary retention, saddle anesthesia, inability to walk, worsening pain, etc and advised to seek urgent attention if these develop.       History of Present Illness:  The patient is a 38 y.o. female who presents for a follow up office visit in regards to Back Pain, Foot Pain, and Leg Pain.   The patient’s cur patient notes symptoms are persistent since last visit and interfere with ambulation and function.    Current pain medications includes:  gabapentin 300 mg qhs, robaxin 750 mg prn, meloxicam 10 mg.  The patient reports that this regimen is providing minimal pain relief.  The patient is reporting no side effects from this pain medication regimen.    I have personally reviewed and/or updated the patient's past medical history, " past surgical history, family history, social history, current medications, allergies, and vital signs today.         Review of Systems  Review of Systems   Constitutional:  Negative for unexpected weight change.   HENT:  Negative for hearing loss.    Eyes:  Negative for visual disturbance.   Respiratory:  Negative for shortness of breath.    Cardiovascular:  Negative for leg swelling.   Gastrointestinal:  Negative for constipation.   Endocrine: Negative for polyuria.   Genitourinary:  Negative for difficulty urinating.   Musculoskeletal:  Positive for myalgias. Negative for gait problem and joint swelling.        Pain in extremity- lower back down leg   Skin:  Negative for rash.   Neurological:  Negative for weakness and headaches.   Psychiatric/Behavioral:  Negative for decreased concentration.    All other systems reviewed and are negative.        Past Medical History:   Diagnosis Date    Brain aneurysm        Past Surgical History:   Procedure Laterality Date    BRAIN SURGERY         Family History   Problem Relation Age of Onset    Aneurysm Mother     Heart attack Mother     Schizophrenia Father     Diabetes Father        Social History     Occupational History    Not on file   Tobacco Use    Smoking status: Never    Smokeless tobacco: Never   Vaping Use    Vaping status: Never Used   Substance and Sexual Activity    Alcohol use: Never    Drug use: Never    Sexual activity: Not on file         Current Outpatient Medications:     acetaminophen (TYLENOL) 500 mg tablet, Take 500 mg by mouth every 6 (six) hours as needed, Disp: , Rfl:     gabapentin (NEURONTIN) 300 mg capsule, Take 300 mg by mouth daily at bedtime, Disp: , Rfl:     Meloxicam 10 MG CAPS, , Disp: , Rfl:     methocarbamol (ROBAXIN) 750 mg tablet, Take 750 mg by mouth 4 (four) times a day as needed, Disp: , Rfl:     predniSONE 20 mg tablet, Take 20 mg by mouth daily (Patient not taking: Reported on 12/19/2023), Disp: , Rfl:     No Known  "Allergies    Physical Exam:    BP (!) 146/113   Pulse 79   Ht 5' 9.5\" (1.765 m)   Wt 97.5 kg (215 lb)   BMI 31.29 kg/m²     Constitutional:normal, well developed, well nourished, alert, in no distress and non-toxic and no overt pain behavior.  Eyes:anicteric  HEENT:grossly intact  Neck:supple, symmetric, trachea midline and no masses   Pulmonary:even and unlabored  Cardiovascular:No edema or pitting edema present  Skin:Normal without rashes or lesions and well hydrated  Psychiatric:Mood and affect appropriate  Neurologic:Cranial Nerves II-XII grossly intact  Musculoskeletal:antalgic gait, able to perform 10 straight leg raises on R side, not able to perform any straight leg raises on L side    Imaging  FL myelogram lumbar    (Results Pending)     No new relevant imaging available for review.    Orders Placed This Encounter   Procedures    FL myelogram lumbar         "

## 2023-12-20 ENCOUNTER — TELEPHONE (OUTPATIENT)
Age: 38
End: 2023-12-20

## 2023-12-20 NOTE — TELEPHONE ENCOUNTER
Caller: Chantell douglas/ [facility] MAZARIEGOS     Doctor:     Reason for call: Please fax over CT myelogram order to them at: [phone]     Call back#: [phone]

## 2023-12-21 NOTE — TELEPHONE ENCOUNTER
Caller: Sonia    Doctor: Albert    Reason for call:W/C calling to have CT sent over via fax number .

## 2023-12-22 DIAGNOSIS — M54.16 LUMBAR RADICULOPATHY: Primary | ICD-10-CM

## 2023-12-22 NOTE — TELEPHONE ENCOUNTER
Caller: Sonia    Doctor: dr amaya    Reason for call: pt workers comp called and asked if the referral for her myelogram can be classified or listed as a ct scan for her workers comp to be scheduled correctly and facility wont schedule without it stating.     Call back#: [phone]    Fax # [phone]

## 2023-12-27 NOTE — TELEPHONE ENCOUNTER
Caller: Sonia ([employer] )    Doctor: Albert    Reason for call: Caller stated that the order needs to be re faxed with the comments stating Ct spine order with Myelogram. Caller stated she received the first fax no comments were added. Please Advise.      Call back#: [phone]

## 2023-12-27 NOTE — TELEPHONE ENCOUNTER
Refaxed with the comments as below:  CT lumbar spine without contrast (Order [id number])  Imaging  Date: 12/22/2023 Department: [facility] Ordering/Authorizing: Annia Price MD     Order Information    Order Name   CT SPINE LUMBAR WO CONTRAST [[id number]]     The link below is only for use if the above order is AMB REFERRAL TO HOME HEALTH  Face to Face Certification Statement (for AMB REFERRAL TO HOME HEALTH Only)  PACS Images     Show images for CT lumbar spine without contrast  Order Questions    Question Answer   Pregnant? No   What is the patient's sedation requirement? If Medication for Claustrophobia is selected, order medication at this point. No Sedation   Release to patient through Mychart Immediate   Exam reason NEEDS CT MYELOGRAM - low back and RLE pain in L5 distribution, progressive R foot drop   Note: Enter reason for exam

## 2024-01-04 ENCOUNTER — TELEPHONE (OUTPATIENT)
Age: 39
End: 2024-01-04

## 2024-01-04 NOTE — TELEPHONE ENCOUNTER
Caller: Kirk    Doctor: Albert    Reason for call: Calling to verify practice address, phone number, and providers NPI.    Call back#: n/a

## 2024-01-10 ENCOUNTER — HOSPITAL ENCOUNTER (OUTPATIENT)
Dept: RADIOLOGY | Facility: HOSPITAL | Age: 39
Discharge: HOME | End: 2024-01-10
Attending: STUDENT IN AN ORGANIZED HEALTH CARE EDUCATION/TRAINING PROGRAM | Admitting: STUDENT IN AN ORGANIZED HEALTH CARE EDUCATION/TRAINING PROGRAM
Payer: OTHER MISCELLANEOUS

## 2024-01-10 ENCOUNTER — TELEPHONE (OUTPATIENT)
Age: 39
End: 2024-01-10

## 2024-01-10 VITALS
OXYGEN SATURATION: 99 % | DIASTOLIC BLOOD PRESSURE: 87 MMHG | RESPIRATION RATE: 18 BRPM | TEMPERATURE: 97.3 F | HEART RATE: 74 BPM | SYSTOLIC BLOOD PRESSURE: 128 MMHG

## 2024-01-10 DIAGNOSIS — M54.16 LUMBAR RADICULOPATHY: ICD-10-CM

## 2024-01-10 PROCEDURE — 62323 NJX INTERLAMINAR LMBR/SAC: CPT | Performed by: STUDENT IN AN ORGANIZED HEALTH CARE EDUCATION/TRAINING PROGRAM

## 2024-01-10 RX ORDER — 0.9 % SODIUM CHLORIDE 0.9 %
2 VIAL (ML) INJECTION ONCE
Status: COMPLETED | OUTPATIENT
Start: 2024-01-10 | End: 2024-01-10

## 2024-01-10 RX ORDER — PAPAVERINE HCL 150 MG
5 CAPSULE, EXTENDED RELEASE ORAL ONCE
Status: COMPLETED | OUTPATIENT
Start: 2024-01-10 | End: 2024-01-10

## 2024-01-10 RX ORDER — LIDOCAINE HYDROCHLORIDE 10 MG/ML
3 INJECTION, SOLUTION EPIDURAL; INFILTRATION; INTRACAUDAL; PERINEURAL ONCE
Status: COMPLETED | OUTPATIENT
Start: 2024-01-10 | End: 2024-01-10

## 2024-01-10 RX ADMIN — Medication 2 ML: at 09:07

## 2024-01-10 RX ADMIN — Medication 5 MG: at 09:09

## 2024-01-10 RX ADMIN — LIDOCAINE HYDROCHLORIDE 3 ML: 10 INJECTION, SOLUTION EPIDURAL; INFILTRATION; INTRACAUDAL; PERINEURAL at 09:07

## 2024-01-10 RX ADMIN — IOHEXOL 0.5 ML: 300 INJECTION, SOLUTION INTRAVENOUS at 09:09

## 2024-01-10 NOTE — TELEPHONE ENCOUNTER
Annia Price MD  [facility] minute ago (4:18 PM)     Called patient to discuss HA symptoms. Patient notes symptoms manageable with rest and avoiding bright lights. Denies any red flag symptoms/neuro deficits. Advised to continue rest, hydration, judicious use of otc analgesics. Advised to c/b if any other concerns/questions or seek care at ER if symptoms intractable or associated w/ other concerning sx.

## 2024-01-10 NOTE — TELEPHONE ENCOUNTER
S/w pt who had LESI today, 1/10/24 RD. Pt had PRADO prior to leaving  and was advised to cb if still having HA later in day. Prior to pt leaving  she was advised to rest, incr fluid intake, incr caffeine (soda/coffee) and take OTC for HA sx. Pt c/b to advise she was cont to have HA/migraine and ringing in ears. Pt states she is unsure of visual distub due to migraine causing interm blurry/watery eyes. Pt states currently drinking soda and laying flat in dark room. Pt states she took 2 Tylenol and 2 Excedrin w/ min relief. RN advised pt thate Epidural HA typically resolve within 24hrs and to cont incr fluids, resting and OTC. RN advised SPA will c/b pt rey for update on sx. Pt verbalized understanding and apprec of call.     Pls advise any further recs. Thank you!

## 2024-01-10 NOTE — H&P
History of Present Illness: The patient is a 38 y.o. female who presents with complaints of low back pain.    Past Medical History:   Diagnosis Date    Brain aneurysm        Past Surgical History:   Procedure Laterality Date    BRAIN SURGERY           Current Outpatient Medications:     acetaminophen (TYLENOL) 500 mg tablet, Take 500 mg by mouth every 6 (six) hours as needed, Disp: , Rfl:     gabapentin (NEURONTIN) 300 mg capsule, Take 300 mg by mouth daily at bedtime, Disp: , Rfl:     Meloxicam 10 MG CAPS, , Disp: , Rfl:     methocarbamol (ROBAXIN) 750 mg tablet, Take 750 mg by mouth 4 (four) times a day as needed, Disp: , Rfl:     predniSONE 20 mg tablet, Take 20 mg by mouth daily (Patient not taking: Reported on 12/19/2023), Disp: , Rfl:   No current facility-administered medications for this encounter.    No Known Allergies    Physical Exam:   Vitals:    01/10/24 0920   BP: 128/87   Pulse: 74   Resp: 18   Temp:    SpO2: 99%     General: Awake, Alert, Oriented x 3, Mood and affect appropriate  Respiratory: Respirations even and unlabored  Cardiovascular: Peripheral pulses intact; no edema  Musculoskeletal Exam: antalgic gait    ASA Score: 2    Patient/Chart Verification  Patient ID Verified: Verbal  Consents Confirmed: Procedural, To be obtained in the Pre-Procedure area  H&P( within 30 days) Verified: Yes  Allergies Reviewed: Yes  Anticoag/NSAID held?: NA  Currently on antibiotics?: NA  Pregnancy denied?: Yes    Assessment:   1. Lumbar radiculopathy        Plan: interlaminar lumbar epidural steroid injection L5-S1

## 2024-01-10 NOTE — DISCHARGE INSTRUCTIONS
Epidural Steroid Injection   WHAT YOU NEED TO KNOW:   An epidural steroid injection (PONCE) is a procedure to inject steroid medicine into the epidural space. The epidural space is between your spinal cord and vertebrae. Steroids reduce inflammation and fluid buildup in your spine that may be causing pain. You may be given pain medicine along with the steroids.          ACTIVITY  Do not drive or operate machinery today.  No strenuous activity today - bending, lifting, etc.  You may resume normal activites starting tomorrow - start slowly and as tolerated.  You may shower today, but no tub baths or hot tubs.  You may have numbness for several hours from the local anesthetic. Please use caution and common sense, especially with weight-bearing activities.    CARE OF THE INJECTION SITE  If you have soreness or pain, apply ice to the area today (20 minutes on/20 minutes off).  Starting tomorrow, you may use warm, moist heat or ice if needed.  You may have an increase or change in your discomfort for 36-48 hours after your treatment.  Apply ice and continue with any pain medication you have been prescribed.  Notify the [facility] if you have any of the following: redness, drainage, swelling, headache, stiff neck or fever above 100°F.    SPECIAL INSTRUCTIONS  Our office will contact you in approximately 7 days for a progress report.    MEDICATIONS  Continue to take all routine medications.  Our office may have instructed you to hold some medications.    As no general anesthesia was used in today's procedure, you should not experience any side effects related to anesthesia.     If you are diabetic, the steroids used in today's injection may temporarily increase your blood sugar levels after the first few days after your injection. Please keep a close eye on your sugars and alert the doctor who manages your diabetes if your sugars are significantly high from your baseline or you are symptomatic.     If you have a  problem specifically related to your procedure, please call our office at [phone].  Problems not related to your procedure should be directed to your primary care physician.

## 2024-01-10 NOTE — TELEPHONE ENCOUNTER
Caller: patient    Doctor: monique    Reason for call: had a procedure today and still experiencing headaches, having migraine and ears are ring and pulsing     Call back#:

## 2024-01-11 NOTE — TELEPHONE ENCOUNTER
Called and spoke to pt this morning. Notes very little change in her headache. Still in a dark room,taking tylenol, motrin or excedrin to get some relief. Is really affected by any light .   Last night noted she had ringing in her ears and pulsating feeling in ears, that went away but came back for a short period of time this morning. It has subsided again.  She said she's coping. Reminded again to push the fluids, water, caffeine, gaterade etc.. She verbalized understanding. Aware I would relay this information to Dr Price

## 2024-01-11 NOTE — TELEPHONE ENCOUNTER
Spoke with patient again. Patient notes she has experienced similar symptoms in the past in the setting of known hx of cerebral aneurysm. Advised patient to seek evaluation at ER for further evaluation of symptoms and work up for other etiologies of HA. Unsure if this is related to typical post LESI headache due to timeline of symptom onset and due to other reported symptoms of ringing/pulsating feeling. She continues to deny any neuro deficits or other derangement. Patient expressed understanding.

## 2024-01-12 NOTE — TELEPHONE ENCOUNTER
Reviewed ER notes. Attempted to call patient to discuss status, no answer. If patient calls back, would advise to continue monitoring symptoms and call office if any new concerns. Follow up scheduled for 1/19/24.

## 2024-01-16 ENCOUNTER — TELEPHONE (OUTPATIENT)
Age: 39
End: 2024-01-16

## 2024-01-16 NOTE — TELEPHONE ENCOUNTER
[facility] is going to be filling pts meds now. They are faxing over a form to be filled out. They faxed it earlier but it is not in the chart so I asked them to fax it again. chung

## 2024-01-17 ENCOUNTER — TELEPHONE (OUTPATIENT)
Dept: PAIN MEDICINE | Facility: CLINIC | Age: 39
End: 2024-01-17

## 2024-01-18 NOTE — TELEPHONE ENCOUNTER
Dinah Alford; [facility]8 minutes ago (8:16 AM)     WS  Form has been received. It has been placed in Dr. Price's folder on her desk for review.

## 2024-01-19 NOTE — TELEPHONE ENCOUNTER
Patient Reports     minimal    %     improvement post injection    Pain Level     right  sciatic goes in and out                         Still pain in the lower back   /10

## 2024-01-23 NOTE — TELEPHONE ENCOUNTER
[facility] that was sent over on 1/18/24.  Their call back number if any questions is [phone] and fax number is [phone] please reference ID [id number]

## 2024-01-24 NOTE — TELEPHONE ENCOUNTER
Patient Reports        0 %     improvement post injection 2week    Pain Level     8/10     Headaches are worse   Patient has an appointment tomorrow with you

## 2024-01-25 ENCOUNTER — OFFICE VISIT (OUTPATIENT)
Dept: PAIN MEDICINE | Facility: CLINIC | Age: 39
End: 2024-01-25
Payer: OTHER MISCELLANEOUS

## 2024-01-25 VITALS
HEIGHT: 70 IN | WEIGHT: 215 LBS | SYSTOLIC BLOOD PRESSURE: 152 MMHG | DIASTOLIC BLOOD PRESSURE: 95 MMHG | HEART RATE: 92 BPM | BODY MASS INDEX: 30.78 KG/M2

## 2024-01-25 DIAGNOSIS — G44.89 OTHER HEADACHE SYNDROME: ICD-10-CM

## 2024-01-25 DIAGNOSIS — M54.16 LUMBAR RADICULOPATHY: ICD-10-CM

## 2024-01-25 DIAGNOSIS — G89.4 CHRONIC PAIN SYNDROME: Primary | ICD-10-CM

## 2024-01-25 PROCEDURE — 99214 OFFICE O/P EST MOD 30 MIN: CPT | Performed by: STUDENT IN AN ORGANIZED HEALTH CARE EDUCATION/TRAINING PROGRAM

## 2024-01-25 RX ORDER — BUTALBITAL, ACETAMINOPHEN AND CAFFEINE 300; 40; 50 MG/1; MG/1; MG/1
CAPSULE ORAL
Qty: 20 CAPSULE | Refills: 0 | Status: SHIPPED | OUTPATIENT
Start: 2024-01-25

## 2024-01-25 NOTE — LETTER
January 25, 2024     Patient: Sandy Greer  YOB: 1985  Date of Visit: 1/25/2024      To Whom it May Concern:    Sandy Greer is under my professional care. Sandy was seen in my office on 1/25/2024. Sandy has not been cleared to return to work at this time. Will reassess at follow up.    If you have any questions or concerns, please don't hesitate to call.         Sincerely,       Annia Price MD

## 2024-01-25 NOTE — PROGRESS NOTES
"Assessment:  1. Chronic pain syndrome    2. Other headache syndrome    3. Lumbar radiculopathy      Portions of the record may have been created with voice recognition software. Occasional wrong word or \"sound a like\" substitutions may have occurred due to the inherent limitations of voice recognition software. Read the chart carefully and recognize, using context, where substitutions have occurred. Contact me with any questions.       Plan:  38-year-old f here for follow-up visit accompanied by spouse and work comp facilitator.  Since last visit, patient underwent interlaminar epidural steroid injection L5-S1 on 1/10/2024.  She denies significant change in low back and LE pain symptoms with this.  After completion of procedure, patient noted almost immediate onset of headache symptoms.  Notes she has been dealing with headaches since the procedure, worse with light, better with hydration, Excedrin, rest.  Patient notes headache symptoms improve with supine positioning but do not resolve.   Patient does have a history of known cerebral aneurysm status post coil embolization, and notes similar headaches prior to this being diagnosed.  She was advised to go to the ER for further evaluation due to this history.  She underwent CT head on 1/11 which did not show significant findings. She feels headaches may have been gradually improving up until 1/22 when she underwent scheduled CT myelogram for further investigation of R foot drop and notes symptoms are worse since then. CT myelogram showed mild multilevel DDD.  She also recently underwent MRA which was previously scheduled for routine monitoring. She denies any neurological deficits or other associated symptoms.    Provided one time script for Fioricet prn for HA management. Risks and benefits discussed. Patient expresses understanding. Advised to continue rest and adequate hydration. Also advised to reach out to provider who performed CT myelogram for any further " recommendations regarding management of symptoms.    Advised to discontinue gabapentin 2/2 lack of relief and possible adverse effect of fatigue.    Patient and spouse defer discussion of further interventional options at this time.    Follow up in 3-4 weeks. Patient encouraged to reach out to office with any other questions/concerns.       My impressions and treatment recommendations were discussed in detail with the patient who verbalized understanding and had no further questions.  Discharge instructions were provided. I personally saw and examined the patient and I agree with the above discussed plan of care.    New Medications Ordered This Visit   Medications    Butalbital-APAP-Caffeine (Fioricet) -40 MG CAPS     Sig: Take 1 to 2 tablets every 4 hours as needed, do not take more than 6 tablets in a day.     Dispense:  20 capsule     Refill:  0         History of Present Illness:  Sandy Greer is a 38 y.o. female who presents for a follow up office visit in regards to Back Pain and Leg Pain.   Denies significant change in symptoms with LESI on 1/10/24. Denies new/progressive weakness, saddle anesthesia, bbi. Notes persistent HA since procedure, symptoms were improving around 1/21 but worse after CT myelogram on 1/22. Notes she has been taking gabapentin 300 mg qhs without significant relief and possible side effect of fatigue.    I have personally reviewed and/or updated the patient's past medical history, past surgical history, family history, social history, current medications, allergies, and vital signs today.     Review of Systems   Eyes:  Negative for visual disturbance.   Respiratory:  Negative for shortness of breath.    Cardiovascular:  Negative for chest pain.   Gastrointestinal:  Negative for constipation, diarrhea and nausea.   Musculoskeletal:  Negative for arthralgias, gait problem, joint swelling and myalgias.        Pain in Extremity - Lower back + Head    Skin:  Negative for rash.  "  Neurological:  Negative for dizziness, seizures and weakness.       Patient Active Problem List   Diagnosis    Lumbar radiculopathy       Past Medical History:   Diagnosis Date    Brain aneurysm        Past Surgical History:   Procedure Laterality Date    BRAIN SURGERY         Family History   Problem Relation Age of Onset    Aneurysm Mother     Heart attack Mother     Schizophrenia Father     Diabetes Father        Social History     Occupational History    Not on file   Tobacco Use    Smoking status: Never    Smokeless tobacco: Never   Vaping Use    Vaping status: Never Used   Substance and Sexual Activity    Alcohol use: Never    Drug use: Never    Sexual activity: Not on file       Current Outpatient Medications on File Prior to Visit   Medication Sig    acetaminophen (TYLENOL) 500 mg tablet Take 500 mg by mouth every 6 (six) hours as needed    gabapentin (NEURONTIN) 300 mg capsule Take 300 mg by mouth daily at bedtime    Meloxicam 10 MG CAPS     methocarbamol (ROBAXIN) 750 mg tablet Take 750 mg by mouth 4 (four) times a day as needed    predniSONE 20 mg tablet Take 20 mg by mouth daily (Patient not taking: Reported on 12/19/2023)     No current facility-administered medications on file prior to visit.       No Known Allergies    Physical Exam:    /95 (BP Location: Right arm, Patient Position: Sitting, Cuff Size: Large)   Pulse 92   Ht 5' 9.5\" (1.765 m)   Wt 97.5 kg (215 lb)   BMI 31.29 kg/m²     Constitutional:normal, well developed, well nourished, alert, in no distress and non-toxic and no overt pain behavior.  Eyes:anicteric  HEENT:grossly intact  Neck:supple, symmetric, trachea midline and no masses   Pulmonary:even and unlabored  Cardiovascular:No edema or pitting edema present  Skin:Normal without rashes or lesions and well hydrated  Psychiatric:Mood and affect appropriate  Neurologic:Cranial Nerves II-XII grossly intact  Musculoskeletal:antalgic gait    Imaging      "

## 2024-02-08 ENCOUNTER — OFFICE VISIT (OUTPATIENT)
Dept: PAIN MEDICINE | Facility: CLINIC | Age: 39
End: 2024-02-08
Payer: OTHER MISCELLANEOUS

## 2024-02-08 ENCOUNTER — TELEPHONE (OUTPATIENT)
Age: 39
End: 2024-02-08

## 2024-02-08 VITALS
HEIGHT: 70 IN | WEIGHT: 215 LBS | DIASTOLIC BLOOD PRESSURE: 107 MMHG | HEART RATE: 99 BPM | BODY MASS INDEX: 30.78 KG/M2 | SYSTOLIC BLOOD PRESSURE: 153 MMHG

## 2024-02-08 DIAGNOSIS — M54.16 LUMBAR RADICULOPATHY: ICD-10-CM

## 2024-02-08 DIAGNOSIS — G89.4 CHRONIC PAIN SYNDROME: Primary | ICD-10-CM

## 2024-02-08 PROCEDURE — 99214 OFFICE O/P EST MOD 30 MIN: CPT | Performed by: STUDENT IN AN ORGANIZED HEALTH CARE EDUCATION/TRAINING PROGRAM

## 2024-02-08 NOTE — TELEPHONE ENCOUNTER
PEP transferred call, [facility] said to transfer to [facility] for [facility] Pain.    Spoke with patients  and explained Rheumatology does not handle chronic pain, pain is not just spine/back, referred as per [facility] from [facility] to [facility] and provided #[phone].    No further questions at this time.

## 2024-02-08 NOTE — PROGRESS NOTES
"Assessment:  1. Chronic pain syndrome    2. Lumbar radiculopathy        Portions of the record may have been created with voice recognition software. Occasional wrong word or \"sound a like\" substitutions may have occurred due to the inherent limitations of voice recognition software. Read the chart carefully and recognize, using context, where substitutions have occurred. Contact me with any questions.       Plan:  38-year-old female here for follow-up visit.  Since last visit, patient notes headache symptoms have significantly improved but notes some residual discomfort.  She did reach out to provider who performed CT myelogram who recommended blood patch, however, patient decided to treat symptoms conservatively.  With regards to low back and LE pain symptoms, patient notes symptoms are unchanged since interlaminar lumbar epidural steroid injection L5-S1 on 1/10/2024.  She notes pain limited difficulty with function but denies new or progressive weakness, saddle anesthesia, bowel/bladder incontinence.  Since last visit, she has weaned off gabapentin due to adverse effect of fatigue.    With regards to low back pain symptoms, referral to neurosurgery provided due to lack of relief from conservative treatment.    Of note, patient's BP was noted to be elevated at this visit and patient notes her blood pressure has been trending high recently at other medical appointments.  Advised to follow-up with PCP regarding BP management.  Discussed possible contribution of elevated BP to residual headache symptoms.    Patient inquiring about rheumatology referral given continued diffuse pain symptoms and notes family history of rheumatoid arthritis.  Referral provided.      My impressions and treatment recommendations were discussed in detail with the patient who verbalized understanding and had no further questions.  Discharge instructions were provided. I personally saw and examined the patient and I agree with the above " discussed plan of care.    Orders Placed This Encounter   Procedures    Ambulatory Referral to Rheumatology     Standing Status:   Future     Standing Expiration Date:   2/8/2025     Referral Priority:   Routine     Referral Type:   Consult - AMB     Referral Reason:   Specialty Services Required     Requested Specialty:   Rheumatology     Number of Visits Requested:   1     Expiration Date:   2/8/2025    Ambulatory referral to Neurosurgery     Standing Status:   Future     Standing Expiration Date:   2/8/2025     Referral Priority:   Routine     Referral Type:   Consult - AMB     Referral Reason:   Specialty Services Required     Requested Specialty:   Neurosurgery     Number of Visits Requested:   1     Expiration Date:   2/8/2025     No orders of the defined types were placed in this encounter.      History of Present Illness:  Sandy Greer is a 38 y.o. female who presents for a follow up office visit in regards to Back Pain, Hip Pain, and Leg Pain.   Patient notes low back and leg pain symptoms are mostly unchanged since last visit.   Notes improvement in headache symptoms since last visit.  She has an upcoming visit with Dr Hi to review recent MRA head results.    I have personally reviewed and/or updated the patient's past medical history, past surgical history, family history, social history, current medications, allergies, and vital signs today.     Review of Systems   Eyes:  Negative for visual disturbance.   Respiratory:  Negative for shortness of breath.    Cardiovascular:  Negative for chest pain.   Gastrointestinal:  Positive for nausea. Negative for constipation and diarrhea.   Musculoskeletal:  Positive for gait problem. Negative for arthralgias, joint swelling and myalgias.        Pain in Extremity - lower back worse    Skin:  Negative for rash.   Neurological:  Positive for dizziness. Negative for seizures and weakness.       Patient Active Problem List   Diagnosis    Lumbar radiculopathy  "      Past Medical History:   Diagnosis Date    Brain aneurysm        Past Surgical History:   Procedure Laterality Date    BRAIN SURGERY         Family History   Problem Relation Age of Onset    Aneurysm Mother     Heart attack Mother     Schizophrenia Father     Diabetes Father        Social History     Occupational History    Not on file   Tobacco Use    Smoking status: Never    Smokeless tobacco: Never   Vaping Use    Vaping status: Never Used   Substance and Sexual Activity    Alcohol use: Never    Drug use: Never    Sexual activity: Not on file       Current Outpatient Medications on File Prior to Visit   Medication Sig    acetaminophen (TYLENOL) 500 mg tablet Take 500 mg by mouth every 6 (six) hours as needed    Butalbital-APAP-Caffeine (Fioricet) -40 MG CAPS Take 1 to 2 tablets every 4 hours as needed, do not take more than 6 tablets in a day.    gabapentin (NEURONTIN) 300 mg capsule Take 300 mg by mouth daily at bedtime    Meloxicam 10 MG CAPS     methocarbamol (ROBAXIN) 750 mg tablet Take 750 mg by mouth 4 (four) times a day as needed    predniSONE 20 mg tablet Take 20 mg by mouth daily (Patient not taking: Reported on 12/19/2023)     No current facility-administered medications on file prior to visit.       No Known Allergies    Physical Exam:    BP (!) 153/107 (BP Location: Right arm, Patient Position: Sitting, Cuff Size: Large)   Pulse 99   Ht 5' 9.5\" (1.765 m)   Wt 97.5 kg (215 lb)   BMI 31.29 kg/m²     Constitutional:normal, well developed, well nourished, alert, in no distress and non-toxic and no overt pain behavior.  Eyes:anicteric  HEENT:grossly intact  Neck:supple, symmetric, trachea midline and no masses   Pulmonary:even and unlabored  Cardiovascular:No edema or pitting edema present  Skin:Normal without rashes or lesions and well hydrated  Psychiatric:Mood and affect appropriate  Neurologic:Cranial Nerves II-XII grossly intact  Musculoskeletal:antalgic gait      Imaging    No new " relevant imaging available for review.

## 2024-02-08 NOTE — LETTER
February 8, 2024     Patient: Sandy Greer  YOB: 1985  Date of Visit: 2/8/2024      To Whom it May Concern:    Sandy Greer is under my professional care. Sandy was seen in my office on 2/8/2024. Sandy was not cleared to return to work at this time until further assessment by neurosurgery.    If you have any questions or concerns, please don't hesitate to call.        Sincerely,        Annia Price MD

## 2024-02-08 NOTE — PATIENT INSTRUCTIONS
We discussed following up with PCP regarding elevated blood pressure.  Rheumatology referral was placed today.  Continue home exercise program.  We also discussed consulting with neurosurgery regarding low back and leg pain.

## 2024-03-08 NOTE — TELEPHONE ENCOUNTER
Reason for call:   [x] Refill   [] Prior Auth  [] Other:     Office:   [] PCP/Provider -   [x] Specialty/Provider - spine and pain    Medication:       Pharmacy: [facility] [place]    Does the patient have enough for 3 days?   [] Yes   [x] No - Send as HP to POD

## 2024-03-11 NOTE — TELEPHONE ENCOUNTER
S/W pt . She notes previous  had ordered these medications for her however once she was referred to Dr Price she no longer sees that person. Did not remember the name.  Asking for Dr Price to refill these medications

## 2024-03-12 DIAGNOSIS — M79.18 MYOFASCIAL PAIN SYNDROME: Primary | ICD-10-CM

## 2024-03-12 RX ORDER — GABAPENTIN 300 MG/1
300 CAPSULE ORAL
Refills: 0 | OUTPATIENT
Start: 2024-03-12

## 2024-03-12 RX ORDER — METHOCARBAMOL 500 MG/1
500 TABLET, FILM COATED ORAL 3 TIMES DAILY PRN
Qty: 90 TABLET | Refills: 0 | Status: SHIPPED | OUTPATIENT
Start: 2024-03-12

## 2024-03-12 RX ORDER — METHOCARBAMOL 500 MG/1
500 TABLET, FILM COATED ORAL 3 TIMES DAILY PRN
Qty: 90 TABLET | Refills: 0 | OUTPATIENT
Start: 2024-03-12

## 2024-03-12 RX ORDER — MELOXICAM 10 MG/1
CAPSULE ORAL
Refills: 0 | OUTPATIENT
Start: 2024-03-12

## 2024-09-12 ENCOUNTER — OFFICE VISIT (OUTPATIENT)
Dept: PAIN MEDICINE | Facility: CLINIC | Age: 39
End: 2024-09-12
Payer: OTHER MISCELLANEOUS

## 2024-09-12 VITALS
WEIGHT: 226 LBS | HEIGHT: 69 IN | DIASTOLIC BLOOD PRESSURE: 89 MMHG | SYSTOLIC BLOOD PRESSURE: 167 MMHG | HEART RATE: 90 BPM | BODY MASS INDEX: 33.47 KG/M2

## 2024-09-12 DIAGNOSIS — M51.36 DDD (DEGENERATIVE DISC DISEASE), LUMBAR: ICD-10-CM

## 2024-09-12 DIAGNOSIS — M53.3 PAIN OF BOTH SACROILIAC JOINTS: Primary | ICD-10-CM

## 2024-09-12 DIAGNOSIS — G89.4 CHRONIC PAIN SYNDROME: ICD-10-CM

## 2024-09-12 PROCEDURE — 99214 OFFICE O/P EST MOD 30 MIN: CPT | Performed by: STUDENT IN AN ORGANIZED HEALTH CARE EDUCATION/TRAINING PROGRAM

## 2024-09-12 NOTE — LETTER
September 12, 2024     Patient: Sandy Greer  YOB: 1985  Date of Visit: 9/12/2024      To Whom it May Concern:    Sandy Greer is under my professional care. Sandy was seen in my office on 9/12/2024. Sandy was not cleared to return to work at this time. Will reassess at follow up visit.    If you have any questions or concerns, please don't hesitate to call.         Sincerely,        Annia Price MD

## 2024-09-12 NOTE — PROGRESS NOTES
"Assessment:  1. Pain of both sacroiliac joints    2. Chronic pain syndrome    3. DDD (degenerative disc disease), lumbar      Portions of the record may have been created with voice recognition software. Occasional wrong word or \"sound a like\" substitutions may have occurred due to the inherent limitations of voice recognition software. Read the chart carefully and recognize, using context, where substitutions have occurred. Contact me with any questions.        Plan:  39-year-old female here for follow-up visit with regards to chronic low back pain symptoms.  She previously underwent an LESI on 1/10/2024 without relief of symptoms.  Since last visit, she has undergone an updated lumbar spine MRI which shows multilevel DDD.  She was evaluated by neurosurgery, Dr. Butler, and no surgical intervention was recommended.  She was previously weaned off gabapentin due to lack of relief and adverse effects but has since been restarted on regimen of gabapentin 300 mg 3 times daily, meloxicam 7.5 mg daily per PCP.  She returns today to discuss persistent low back pain symptoms.  Symptoms likely multifactorial in the setting of sacroiliac joint pain, lumbar DDD, myofascial pain.    Discussed option of bilateral sacroiliac joint injections for symptom management.    Recommend course of physical therapy for optimizing function, ambulation in the setting of chronic pain symptoms.    Follow-up in 1 month after injection.      Complete risks and benefits including bleeding, infection, tissue reaction, nerve injury and allergic reaction were discussed. The approach was demonstrated using models and literature was provided. Verbal and written consent was obtained.      History of Present Illness:  The patient is a 39 y.o. female who presents for a follow up office visit in regards to Back Pain, Groin Pain, and Buttocks Pain.       I have personally reviewed and/or updated the patient's past medical history, past surgical history, " family history, social history, current medications, allergies, and vital signs today.       Review of Systems  Review of Systems   Constitutional:  Negative for fever.   HENT:  Negative for sinus pain.    Eyes:  Negative for redness.   Respiratory:  Negative for shortness of breath.    Cardiovascular:  Negative for palpitations.   Gastrointestinal:  Negative for abdominal pain and nausea.   Endocrine: Negative for polydipsia.   Genitourinary:  Negative for difficulty urinating.   Musculoskeletal:  Positive for back pain and gait problem. Negative for myalgias.        Pain in lower back and buttocks   Skin:  Negative for rash.   Neurological:  Negative for seizures and headaches.   Psychiatric/Behavioral:  Negative for decreased concentration. The patient is not nervous/anxious.            Past Medical History:   Diagnosis Date    Brain aneurysm        Past Surgical History:   Procedure Laterality Date    BRAIN SURGERY         Family History   Problem Relation Age of Onset    Aneurysm Mother     Heart attack Mother     Schizophrenia Father     Diabetes Father        Social History     Occupational History    Not on file   Tobacco Use    Smoking status: Never    Smokeless tobacco: Never   Vaping Use    Vaping status: Never Used   Substance and Sexual Activity    Alcohol use: Never    Drug use: Never    Sexual activity: Not on file         Current Outpatient Medications:     acetaminophen (TYLENOL) 500 mg tablet, Take 500 mg by mouth every 6 (six) hours as needed, Disp: , Rfl:     Butalbital-APAP-Caffeine (Fioricet) -40 MG CAPS, Take 1 to 2 tablets every 4 hours as needed, do not take more than 6 tablets in a day., Disp: 20 capsule, Rfl: 0    gabapentin (NEURONTIN) 300 mg capsule, Take 300 mg by mouth daily at bedtime, Disp: , Rfl:     Meloxicam 10 MG CAPS, , Disp: , Rfl:     methocarbamol (ROBAXIN) 500 mg tablet, Take 1 tablet (500 mg total) by mouth 3 (three) times a day as needed for muscle spasms, Disp: 90  "tablet, Rfl: 0    predniSONE 20 mg tablet, Take 20 mg by mouth daily (Patient not taking: Reported on 12/19/2023), Disp: , Rfl:     No Known Allergies    Physical Exam:    /89   Pulse 90   Ht 5' 9\" (1.753 m)   Wt 103 kg (226 lb)   BMI 33.37 kg/m²     Constitutional:normal, well developed, well nourished, alert, in no distress and non-toxic and no overt pain behavior.  Eyes:anicteric  HEENT:grossly intact  Neck:supple, symmetric, trachea midline and no masses   Pulmonary:even and unlabored  Cardiovascular:No edema or pitting edema present  Skin:Normal without rashes or lesions and well hydrated  Psychiatric:Mood and affect appropriate  Neurologic:Cranial Nerves II-XII grossly intact  Musculoskeletal:antalgic gait, + b/l sacral compression, + thigh thrust, + JESUS        Imaging  FL spine and pain procedure    (Results Pending)     MRI lumbar spine wo contrast  Order: [id number]  Impression    Impression:  Mild lumbar spondylosis. Small disc protrusions at L3-L4, L4-L5 and L5-S1.  Question of L5 nerve root impingement laterally by the disc osteophyte complex.    The imaging quality is degraded by patient motion.  Pathology may be obscured.                Workstation:[id number]  Narrative    History: Low back pain    Procedure: Multiplanar, multisequential MRI of the lumbar spine was performed  without contrast after motion    Comparison: None.    Findings: For the purposes of this dictation, the lumbar vertebrae are labeled  from a caudal to cranial direction, the first vertebra with lumbar morphology is  labeled as L5.    Alignment: Unremarkable    Marrow: Unremarkable    Vertebral Bodies: Unremarkable    Disk Spaces: There is multilevel disk degeneration and dessication.    L1-L2: There is no spinal stenosis or foraminal narrowing.    L2-L3 : There is no spinal stenosis or foraminal narrowing.    L3-L4: Small broad-based protrusion. Mild facet arthropathy. Mild narrowing of  the canal. No significant neural " foraminal narrowing    L4-L5: Small right paracentral disc protrusion narrowing the right lateral  recess and focally indenting the ventral thecal sac. No convincing traversing  nerve root impingement. Minimal neural foraminal narrowing    L5-S1: Small central protrusion. Mild facet arthropathy. Patent spinal canal  there is question of L5 nerve root impingement laterally by the disc osteophyte  complex    Conus and lower thoracic cord: The conus is normal in position and signal  intensity.      Orders Placed This Encounter   Procedures    FL spine and pain procedure    Ambulatory referral to Physical Therapy

## 2024-09-16 ENCOUNTER — TELEPHONE (OUTPATIENT)
Age: 39
End: 2024-09-16

## 2024-09-16 NOTE — TELEPHONE ENCOUNTER
S/w pt and inquired if pt had fax # insur would like PT ref sent to? Pt states she has call out to [facility]  and will c/b w/ fax # if needed.

## 2024-09-19 NOTE — TELEPHONE ENCOUNTER
Caller: Sandy    Doctor: PATRICE    Reason for call: pt would like referral for PT sent to [facility] at fax [phone] or [phone]. She would also like us to call her insurance so they can be notified she had a referral sent at [phone]    Call back#: [phone]

## 2024-09-23 NOTE — TELEPHONE ENCOUNTER
Caller: Patient     Doctor: Albert     Reason for call: Please refax the rheumatology referral to: [phone] or [phone]     Call back#: [phone]

## 2024-09-25 ENCOUNTER — TELEPHONE (OUTPATIENT)
Age: 39
End: 2024-09-25

## 2024-09-25 ENCOUNTER — EVALUATION (OUTPATIENT)
Dept: PHYSICAL THERAPY | Age: 39
End: 2024-09-25
Payer: OTHER MISCELLANEOUS

## 2024-09-25 DIAGNOSIS — M54.16 LUMBAR RADICULOPATHY: ICD-10-CM

## 2024-09-25 DIAGNOSIS — M53.2X6 LUMBAR SPINE INSTABILITY: Primary | ICD-10-CM

## 2024-09-25 PROCEDURE — 97161 PT EVAL LOW COMPLEX 20 MIN: CPT

## 2024-09-25 PROCEDURE — 97113 AQUATIC THERAPY/EXERCISES: CPT

## 2024-09-25 NOTE — TELEPHONE ENCOUNTER
Caller: patient    Doctor: Albert    Reason for call: would like to reschedule her procedure, does not have a ride      Please call back around 12      Call back#:

## 2024-09-25 NOTE — PROGRESS NOTES
PT Evaluation     Today's date: 2024  Patient name: Sandy Greer  : 1985  MRN: [id number]  Referring provider: Annia Price MD  Dx:   Encounter Diagnosis     ICD-10-CM    1. Lumbar spine instability  M53.2X6       2. Lumbar radiculopathy  M54.16           Start Time: 1000  Stop Time: 1100  Total time in clinic (min): 60 minutes    Assessment  Impairments: abnormal gait, abnormal muscle firing, abnormal muscle tone, abnormal or restricted ROM, abnormal movement, activity intolerance, impaired balance, impaired physical strength, lacks appropriate home exercise program, pain with function, weight-bearing intolerance, participation limitations and activity limitations  Symptom irritability: high    Assessment details: After gathering a subjective history along with the completion of a chart review and musculoskeletal screen the pt appears to be a good candidate for aquatic therapy to address s/s presenting from the back. Chart review in combination with subjective and eval findings support a posterolateral disc bulge of multiple lumbar levels that is affecting the nerves. Notable findings from eval include a high pain severity making it difficult to weight bear and balance through the R leg. She has radiculars bilaterally into the hips and down to the toes, worse of the right. Aquatic therapy indicated for this patient to create a relaxing environment and to create an environment to improve strength, WB, and ROM outside of the effects of gravity. Further indications include minimizing fall risk/re-injury and providing traction to the low back. Reviewed the rules of the pool, the patients ability to ascend and descend steps, as well as cleared the patient of any s/s that would be contraindicated or a precaution for aquatic therapy.     Pool Contraindications:  Seizures - no   Uncompensated CHF - no   Unstable angina - no   Severe kidney disease - no   Severe PVD - no   Open wounds or occlusive  dressings - no   Colostomy- no   Water/airborne infections - no   Risk of bleeding or hemorrhage - no   Lack of bowel or bladder functions - no     Pool Precautions:   Fear of water/inability to swim - no   Respiratory disorders - no   Cardiac dysfunction - no   Small open wounds - no    Barriers to therapy: chronicity  Understanding of Dx/Px/POC: fair     Prognosis: fair    Goals  In 4 visits:   1) Pt will improve FOTO score by 6 to meet LTGs  2) Pt will improve 5 x sit to stand by 3-4 seconds to work toward LTGs and show MDC  3) Pt will be able to tolerate a 50 minute PT aquatic tx session to demonstrate improved endurance  4) Pts radicular symptoms will centralize for >= 5 days to start lumbar ROM outside of their preferred posture and work toward LTGs.  5) Pt will be able to walk in the water without the noodle for 5 minutes   Complete 2 minute walk test  Provide HEP with nerve glides and piriformis stretching    In 8 visits:   1) Pt will improve TUG from 19 seconds to 12 seconds to decrease risk of falls   2) Pt will improve 5 x sit to stand from 40 seconds to 30 seconds to show LE muscular gains and meet personal goals   3) Pt will improve FOTO score from 29 pts to 43 pts to demonstrate a measurable change in physical status   4) Pt will demonstrate 100% competency of HEP to be appropriate for D/C from therapy after goals have been met, pt is functioning at PLOF, and/or the pt displays significant improvement.    5) Pt will be able to complete 10 repetitions into all lumbar quadrants without peripheralization of symptoms to be able to extend for items on top shelf  6) Pt will be able to independently set up sciatic nerve glides  7) Pt will be able to independently perform piriformis stretch to be able to put on socks and shoes     In 12 visits:  Pt will improve standing tolerance from 5 minutes to 10 minutes to be able to cook pasta  Pt will return to sleeping in her bed instead of the  recliner    Plan  Patient would benefit from: skilled physical therapy  Referral necessary: No    Planned therapy interventions: abdominal trunk stabilization, activity modification, ADL retraining, ADL training, aquatic therapy, balance, balance/weight bearing training, body mechanics training, flexibility, functional ROM exercises, gait training, graded activity, graded exercise, graded motor, home exercise program, IADL retraining, whirlpool, transfer training, therapeutic training, therapeutic exercise, therapeutic activities, strengthening, stretching, manual therapy, massage, neuromuscular re-education and nerve gliding    Frequency: 2x week  Duration in weeks: 6  Plan of Care beginning date: 9/25/2024  Plan of Care expiration date: 12/24/2024  Treatment plan discussed with: patient and PTA  Plan details: Pt was in agreement that they will be treated by myself in combination with a PTA. Further, informed that we work as a team, the PTAs follow the POC created by the PT, and I trust them to make safe and reasonable changes when appropriate under their scope of practice.      Pt was educated on the nature of their dx and the benefits of completing physical therapy. Additionally, pt is encouraged to ask questions regarding their dx and POC.        Subjective Evaluation    History of Present Illness  Mechanism of injury: trauma  Mechanism of injury: Pt presents to therapy with LBP. She was at work and the fire alarms were leaking with water, she was told to remove them and empty them. She bent over to lift a bin of them and hurt her back. This happened October 2nd 2023. She has seen multiple doctors regarding the pain. She has trailed injections for her bulging disc and different type of injection for her SIJ. The injections do not help relief her pain. Because of this pain she has difficulty doing anything for a prolonged period, she can find comfort sleeping in the recliner. She has disrupted sleeping.             Recurrent probem    Quality of life: good    Patient Goals  Patient goals for therapy: decreased pain, improved balance, increased strength, increased motion, return to sport/leisure activities, return to work and independence with ADLs/IADLs  Patient goal: sleep through the night, return to work,  Pain  Current pain ratin  At best pain ratin  At worst pain rating: 10  Location: low back into b/l buttcheek R>L radiates into her leg down to her foot  Quality: radiating, needle-like, sharp and knife-like  Relieving factors: medications (TENs unit, hot shower, gabapentin, meloxican, methocarbonaly)  Aggravating factors: sitting, standing, stair climbing, walking, lifting and overhead activity  Progression: worsening    Social Support  Steps to enter house: yes  2  Stairs in house: yes   5  Lives in: multiple-level home  Lives with: spouse and adult children    Working: disability, workers comp.  Hand dominance: right  Exercise history: tennis, golf      Diagnostic Tests  MRI studies: abnormal  Treatments  Previous treatment: physical therapy  Current treatment: injection treatment      Objective   Mechanical Assessment    Cervical      Thoracic      Lumbar    Sitting flexion: repeated movements  Pain location: no change  Standing extension: repeated movements  Pain location: peripheralized  Pain intensity: worse  Pain level: increased  Right sidegliding: repeated movements  Pain location: peripheralized  Pain intensity:worse  Pain level: increased    Tests     Lumbar     Left   Positive slump test.     Right   Positive slump test.     Functional Assessment        Comments  5 x sit to stand: 41 seconds   -- armrests  -- bent over     mCTSIB: passed condition #1, unable to perform conditions 2-4    TU seconds  -- armrests for pushoff             POC expires Unit limit Auth Expiration date PT/OT + Visit Limit?   24 BOMN  BOMN                           Visit/Unit Tracking  AUTH Status:  Date                No auth needed  Used 1               Remaining                 FOTO 29/43                  Precautions:  Daily Treatment Diary     Date 9/25          Patient education JM          Water Walk (FW, BW, side) x10’            LE work at wall               Hip FF/ext swing              Toe/heel              Hip ABD/ADD              March             Knee flexion & extension             Squats           UE noodle x3             Push/pull              Rotate              Row forward & back              OH side bend            UE/Core (AROM, paddles, MB)              ABD/ADD              Horizontal Pec Fly              Alt. arm swing (shld flex/ext)              Push pull              Single paddle rotation           SLS (EO, EC, ball toss)            Aqua Step (FW, lat, eccentric)            Core work:              MF press (red, blue, blk)             Kickboard press (blue, red)              Row/ext w/ tubing (red, blue, blk)           Seated on bench             ankle DF/PF              March              ABD/ADD              Knee flexion/extension            DW TX - hang in the deep water  5            DW ABD/ADD  1            DW Bicycle  5            DW Scissor hip flexion/extension  1            DW DKTC  x10          Stretches              HS at step              Wall stretches              Calf stretch at wall              Other:            Hot Tub - 10 minutes only  5'

## 2024-09-27 ENCOUNTER — OFFICE VISIT (OUTPATIENT)
Dept: PHYSICAL THERAPY | Age: 39
End: 2024-09-27
Payer: OTHER MISCELLANEOUS

## 2024-09-27 DIAGNOSIS — M54.16 LUMBAR RADICULOPATHY: ICD-10-CM

## 2024-09-27 DIAGNOSIS — M53.2X6 LUMBAR SPINE INSTABILITY: Primary | ICD-10-CM

## 2024-09-27 PROCEDURE — 97113 AQUATIC THERAPY/EXERCISES: CPT

## 2024-09-27 NOTE — PROGRESS NOTES
Daily Note     Today's date: 2024  Patient name: Sandy Greer  : 1985  MRN: [id number]  Referring provider: Annia Price MD  Dx:   Encounter Diagnosis     ICD-10-CM    1. Lumbar spine instability  M53.2X6       2. Lumbar radiculopathy  M54.16           Start Time: 1000  Stop Time: 1100  Total time in clinic (min): 60 minutes    Subjective: patient reports she did ok with initial pool session, c/o 7-8/10 Lbp and radiates into her gluts and thigh.       Objective: See treatment diary below      Assessment: Tolerated treatment poor to fair with therapy, increased pain throughout session aaliyah with R LE/foot, verbal cueing for ex technique and posture due to her severe forward flexed posture while in the water. Increased exercises and will progress as tolerated.  Patient demonstrated fatigue post treatment and would benefit from continued PT      Plan: Continue per plan of care.  Progress treatment as tolerated.      Patient seen 1:1 for 25 minutes and rest time with JR      POC expires Unit limit Auth Expiration date PT/OT + Visit Limit?   24 BOMN  BOMN                           Visit/Unit Tracking  AUTH Status:  Date              No auth needed  Used 1 2              Remaining                 FOTO                   Precautions:  Daily Treatment Diary     Date          Patient education JM          Water Walk (FW, BW, side) x10’   5F         LE work at wall               Hip FF/ext swing              Toe/heel   DW 1            Hip ABD/ADD   DW   DW 1           Knee flexion & extension  DW 1           Squats           UE noodle x3             Push/pull   1'           Rotate              Row forward & back   1'           OH side bend            UE/Core (AROM, paddles, MB)              ABD/ADD              Horizontal Pec Fly              Alt. arm swing (shld flex/ext)              Push pull              Single paddle rotation           SLS (EO, EC, ball toss)  "           Aqua Step (FW, lat, eccentric)            Core work:              MF press (red, blue, blk)             Kickboard press (blue, red)              Row/ext w/ tubing (red, blue, blk)           Seated on bench             ankle DF/PF   5\"x5           March              ABD/ADD              Knee flexion/extension   1'           DW TX - hang in the deep water  5 5' 10'           DW ABD/ADD  1 1'           DW Bicycle  5 5'           DW Scissor hip flexion/extension  1 1'           DW DKTC  x10 10x         Stretches               SKTC  1'           Wall stretches              Calf stretch at wall              Other:            Hot Tub - 10 minutes only  5' 10'                   "

## 2024-10-01 ENCOUNTER — APPOINTMENT (OUTPATIENT)
Dept: PHYSICAL THERAPY | Age: 39
End: 2024-10-01
Payer: OTHER MISCELLANEOUS

## 2024-10-01 NOTE — PROGRESS NOTES
"Daily Note     Today's date: 10/1/2024  Patient name: Sandy Greer  : 1985  MRN: [id number]  Referring provider: Annia Price MD  Dx:   Encounter Diagnosis     ICD-10-CM    1. Lumbar spine instability  M53.2X6       2. Lumbar radiculopathy  M54.16                      Subjective: ***      Objective: See treatment diary below      Assessment: Tolerated treatment {Tolerated treatment :1840235361}. Patient {assessment:6219164659}      Plan: {PLAN:3731149606}     POC expires Unit limit Auth Expiration date PT/OT + Visit Limit?   24 BOMN  BOMN                           Visit/Unit Tracking  AUTH Status:  Date              No auth needed  Used 1 2              Remaining                 FOTO                   Precautions:  Daily Treatment Diary     Date          Patient education           Water Walk (FW, BW, side) x10’   5F         LE work at wall               Hip FF/ext swing              Toe/heel   DW 1            Hip ABD/ADD   DW 1           March  DW 1           Knee flexion & extension  DW 1           Squats           UE noodle x3             Push/pull   1'           Rotate              Row forward & back   1'           OH side bend            UE/Core (AROM, paddles, MB)              ABD/ADD              Horizontal Pec Fly              Alt. arm swing (shld flex/ext)              Push pull              Single paddle rotation           SLS (EO, EC, ball toss)            Aqua Step (FW, lat, eccentric)            Core work:              MF press (red, blue, blk)             Kickboard press (blue, red)              Row/ext w/ tubing (red, blue, blk)           Seated on bench             ankle DF/PF   5\"x5           March              ABD/ADD              Knee flexion/extension   1'           DW TX - hang in the deep water  5 5' 10'           DW ABD/ADD  1 1'           DW Bicycle  5 5'           DW Scissor hip flexion/extension  1 1'           DW DKTC  x10 10x         Stretches "               SKTC  1'           Wall stretches              Calf stretch at wall              Other:            Hot Tub - 10 minutes only  5' 10'

## 2024-10-03 ENCOUNTER — OFFICE VISIT (OUTPATIENT)
Dept: PHYSICAL THERAPY | Age: 39
End: 2024-10-03
Payer: OTHER MISCELLANEOUS

## 2024-10-03 DIAGNOSIS — M54.16 LUMBAR RADICULOPATHY: ICD-10-CM

## 2024-10-03 DIAGNOSIS — M53.2X6 LUMBAR SPINE INSTABILITY: Primary | ICD-10-CM

## 2024-10-03 PROCEDURE — 97113 AQUATIC THERAPY/EXERCISES: CPT

## 2024-10-03 PROCEDURE — 97150 GROUP THERAPEUTIC PROCEDURES: CPT

## 2024-10-03 NOTE — PROGRESS NOTES
Daily Note     Today's date: 10/3/2024  Patient name: Sandy Greer  : 1985  MRN: [id number]  Referring provider: Annia Price MD  Dx:   Encounter Diagnosis     ICD-10-CM    1. Lumbar spine instability  M53.2X6       2. Lumbar radiculopathy  M54.16           Start Time: 0900  Stop Time: 1000  Total time in clinic (min): 60 minutes    Subjective: patient reports that she is ok today, c/o 8/10 mid LBP radiating down r leg.       Objective: See treatment diary below      Assessment: Tolerated treatment fair today, needs constant verbal  cueing throughout prescribed exercises for proper execution and dosage and aaliyah posture in the water. Patient was able to exercise her LE at the wall today.  Significant flexed posture. Patient demonstrated fatigue post treatment and would benefit from continued PT      Plan: Continue per plan of care.     Patient seen 1:1 for 30 minutes and rest of time group setting.       POC expires Unit limit Auth Expiration date PT/OT + Visit Limit?   24 BOMN  BOMN                           Visit/Unit Tracking  AUTH Status:  Date 9/25 9/27 10/3            No auth needed  Used 1 2 3             Remaining                 FOTO                   Precautions:  Daily Treatment Diary     Date 9/25 9/27 10/3        Patient education JM          Water Walk (FW, BW, side) x10’   5F 5' F reg noodle        LE work at wall               Hip FF/ext swing              Toe/heel   DW 1  1          Hip ABD/ADD   DW 1 2          March  DW 1 1          Knee flexion & extension  DW 1 1          Squats   1        UE noodle x3             Push/pull   1' 1          Rotate              Row forward & back   1'           OH side bend            UE/Core (AROM, paddles, MB)              ABD/ADD              Horizontal Pec Fly              Alt. arm swing (shld flex/ext)              Push pull              Single paddle rotation           SLS (EO, EC, ball toss)            Aqua Step (FW, lat, eccentric)   "          Core work:              MF press (red, blue, blk)             Kickboard press (blue, red)              Nerve glide/floss   10x ea        Seated on bench             ankle DF/PF   5\"x5 5\"x5 ea          March              ABD/ADD              Knee flexion/extension   1' 2          DW TX - hang in the deep water  5 5' 10' 10' 10'          DW ABD/ADD  1 1' 1          DW Bicycle  5 5' 5          DW Scissor hip flexion/extension  1 1' 1          DW DKTC  x10 10x 10x ea        Stretches               SKTC  1' 10x          Wall stretches              Calf stretch at wall              Other:            Hot Tub - 10 minutes only  5' 10' 10'                    "

## 2024-10-04 ENCOUNTER — APPOINTMENT (OUTPATIENT)
Dept: PHYSICAL THERAPY | Age: 39
End: 2024-10-04
Payer: OTHER MISCELLANEOUS

## 2024-10-09 ENCOUNTER — OFFICE VISIT (OUTPATIENT)
Dept: PHYSICAL THERAPY | Age: 39
End: 2024-10-09
Payer: OTHER MISCELLANEOUS

## 2024-10-09 DIAGNOSIS — M53.2X6 LUMBAR SPINE INSTABILITY: Primary | ICD-10-CM

## 2024-10-09 DIAGNOSIS — M54.16 LUMBAR RADICULOPATHY: ICD-10-CM

## 2024-10-09 PROCEDURE — 97113 AQUATIC THERAPY/EXERCISES: CPT

## 2024-10-09 NOTE — PROGRESS NOTES
Daily Note     Today's date: 10/9/2024  Patient name: Sandy Greer  : 1985  MRN: [id number]  Referring provider: Annia Price MD  Dx:   Encounter Diagnosis     ICD-10-CM    1. Lumbar spine instability  M53.2X6       2. Lumbar radiculopathy  M54.16                      Subjective: pt notes R buttock pain, LBP and R foot pain. She notes no sig relief w/ anything. Some decreased pain w/ sitting in her recliner at home.       Objective: See treatment diary below      Assessment: Tolerated treatment fairly. Poor tolerance for corrective posture and gait. Pt prefers to stay in a flexed position. Added piriformis stretches on bench today w/ long sit HS stretch. Started w/ bent knees and slowly worked into more of a straight leg stretch as she tolerated. Pt is very tight in LB and LE's from inactivity. HEP of piriformis, KTC and HS stretches to tolerance. Encouraging movement w/ pt, assuring her stretches are not hurting her, gentle movements are not injuring her but she needs to move even if it's just in a less or pain free zone. Pt is pain focused throughout session. Patient would benefit from continued PT      Plan: Continue per plan of care.      POC expires Unit limit Auth Expiration date PT/OT + Visit Limit?   24 BOMN  BOMN                           Visit/Unit Tracking  AUTH Status:  Date 9/25 9/27 10/3 10/9           No auth needed  Used 1 2 3 4            Remaining                 FOTO 29/43                  Precautions:  Daily Treatment Diary     Date 9/25 9/27 10/3 10/9       Patient education JM          Water Walk (FW, BW, side) x10’   5F 5' F reg noodle 5' w/ N       LE work at wall               Hip FF/ext swing              Toe/heel   DW 1  1 1         Hip ABD/ADD   DW 1 2 2         March  DW 1 1 1         Knee flexion & extension  DW 1 1 1         Squats   1        UE noodle x3             Push/pull   1' 1 1         Rotate              Row forward & back   1'           OH side bend        "     UE/Core (AROM, paddles, MB)              ABD/ADD              Horizontal Pec Fly              Alt. arm swing (shld flex/ext)              Push pull              Single paddle rotation           SLS (EO, EC, ball toss)            Aqua Step (FW, lat, eccentric)            Core work:            Long sit HS stretch    10''x3ea       Seated piriformis stretches    10''x3ea         Nerve glide/floss   10x ea x10ea       Seated on bench             ankle DF/PF   5\"x5 5\"x5 ea 1         March 1         ABD/ADD     1         Knee flexion/extension   1' 2 1         DW TX - hang in the deep water  5 5' 10' 10' 10' 5,5,5         DW ABD/ADD  1 1' 1 1         DW Bicycle  5 5' 5 5         DW Scissor hip flexion/extension  1 1' 1          DW DKTC  x10 10x 10x ea 1       Stretches               SKTC  1' 10x 1         Wall stretches              Calf stretch at wall              Other:            Hot Tub - 10 minutes only  5' 10' 10' 10                     "

## 2024-10-11 ENCOUNTER — OFFICE VISIT (OUTPATIENT)
Dept: PHYSICAL THERAPY | Age: 39
End: 2024-10-11
Payer: OTHER MISCELLANEOUS

## 2024-10-11 DIAGNOSIS — M54.16 LUMBAR RADICULOPATHY: ICD-10-CM

## 2024-10-11 DIAGNOSIS — M53.2X6 LUMBAR SPINE INSTABILITY: Primary | ICD-10-CM

## 2024-10-11 PROCEDURE — 97113 AQUATIC THERAPY/EXERCISES: CPT

## 2024-10-11 NOTE — PROGRESS NOTES
Daily Note     Today's date: 10/11/2024  Patient name: Sandy Greer  : 1985  MRN: [id number]  Referring provider: Annia Price MD  Dx:   Encounter Diagnosis     ICD-10-CM    1. Lumbar spine instability  M53.2X6       2. Lumbar radiculopathy  M54.16           Start Time: 0900  Stop Time: 1000  Total time in clinic (min): 60 minutes    Subjective: Patient reports she tried her exercises yesterday and she thinks she twisted wrong and is having increased pain. She notes a little better today but still c/o 8/10 LBP.       Objective: See treatment diary below      Assessment: Tolerated treatment fair throughout session today. Still have difficulty staying upright, heavily flexed posture. Has difficulty staying in good posture, prefers flexed. Cues for ex technique. Very tight LE R>L and LB.  Patient demonstrated fatigue post treatment and would benefit from continued PT      Plan: Continue per plan of care.      Patient seen 1:1 for 45 minutes and rest of time group setting.    POC expires Unit limit Auth Expiration date PT/OT + Visit Limit?   24 BOMN  BOMN                           Visit/Unit Tracking  AUTH Status:  Date 9/25 9/27 10/3 10/9 10/11          No auth needed  Used 1 2 3 4 5           Remaining                 FOTO                   Precautions:  Daily Treatment Diary     Date 9/25 9/27 10/3 10/9 10/11      Patient education JM          Water Walk (FW, BW, side) x10’   5F 5' F reg noodle 5' w/ N 3' w' N      LE work at wall               Hip FF/ext swing              Toe/heel   DW 1  1 1 1        Hip ABD/ADD   DW 1 2 2 2        March  DW 1 1 1 1        Knee flexion & extension  DW 1 1 1 1        Squats   1        UE noodle x3             Push/pull   1' 1 1 1        Rotate              Row forward & back   1'           OH side bend            UE/Core (AROM, paddles, MB)              ABD/ADD              Horizontal Pec Fly              Alt. arm swing (shld flex/ext)              Push  "pull              Single paddle rotation           SLS (EO, EC, ball toss)            Aqua Step (FW, lat, eccentric)            Core work:            Long sit HS stretch    10''x3ea 10\"x3      Seated piriformis stretches    10''x3ea 10\"x3        Nerve glide/floss   10x ea x10ea 10x ea      Seated on bench             ankle DF/PF   5\"x5 5\"x5 ea 1 1        March     1 1        ABD/ADD     1 1        Knee flexion/extension   1' 2 1 1        DW TX - hang in the deep water  5 5' 10' 10' 10' 5,5,5 5' 5' 5'        DW ABD/ADD  1 1' 1 1 1        DW Bicycle  5 5' 5 5 5        DW Scissor hip flexion/extension  1 1' 1          DW DKTC  x10 10x 10x ea 1 1      Stretches               SKTC  1' 10x 1 1        Wall stretches              Calf stretch at wall              Other:            Hot Tub - 10 minutes only  5' 10' 10' 10 10                      "

## 2024-10-15 ENCOUNTER — OFFICE VISIT (OUTPATIENT)
Dept: PHYSICAL THERAPY | Age: 39
End: 2024-10-15
Payer: OTHER MISCELLANEOUS

## 2024-10-15 DIAGNOSIS — M53.2X6 LUMBAR SPINE INSTABILITY: Primary | ICD-10-CM

## 2024-10-15 DIAGNOSIS — M54.16 LUMBAR RADICULOPATHY: ICD-10-CM

## 2024-10-15 PROCEDURE — 97113 AQUATIC THERAPY/EXERCISES: CPT | Performed by: PHYSICAL THERAPIST

## 2024-10-15 NOTE — PROGRESS NOTES
Daily Note     Today's date: 10/15/2024  Patient name: Sandy Greer  : 1985  MRN: [id number]  Referring provider: Annia Price MD  Dx:   Encounter Diagnosis     ICD-10-CM    1. Lumbar spine instability  M53.2X6       2. Lumbar radiculopathy  M54.16           Start Time: 1000  Stop Time: 1100  Total time in clinic (min): 60 minutes    Subjective: same      Objective: See treatment diary below      Assessment: Tolerated treatment fair. Patient demonstrated fatigue post treatment, exhibited good technique with therapeutic exercises, and would benefit from continued PT, patient ambulates with flexed posture with tight hip flexors and increased lordosis and notes some relief with DEW TX, also still with low back pain and right buttock radicular pain added 5# to right LE with DEW TX      Plan: Progress treatment as tolerated.       Patient seen 1:1 for 45 minutes and rest of time group setting.    POC expires Unit limit Auth Expiration date PT/OT + Visit Limit?   24 BOMN  BOMN                           Visit/Unit Tracking  AUTH Status:  Date 9/25 9/27 10/3 10/9 10/11 10/15         No auth needed  Used 1 2 3 4 5           Remaining                 FOTO /43                  Precautions:  Daily Treatment Diary     Date 9/25 9/27 10/3 10/9 10/11 10/15     Patient education JM          Water Walk (FW, BW, side) x10’   5F 5' F reg noodle 5' w/ N 3' w' N 5     LE work at wall               Hip FF/ext swing              Toe/heel   DW 1  1 1 1 1       Hip ABD/ADD   DW 1 2 2 2 2       March  DW 1 1 1 1 1       Knee flexion & extension  DW 1 1 1 1 1       Squats   1        UE noodle x3             Push/pull   1' 1 1 1 1       Rotate              Row forward & back   1'           OH side bend            UE/Core (AROM, paddles, MB)              ABD/ADD              Horizontal Pec Fly              Alt. arm swing (shld flex/ext)              Push pull              Single paddle rotation           SLS (EO, EC, ball  "toss)            Aqua Step (FW, lat, eccentric)            Core work:            Long sit HS stretch    10''x3ea 10\"x3 3x     Seated piriformis stretches    10''x3ea 10\"x3 3x       Nerve glide/floss   10x ea x10ea 10x ea 10x     Seated on bench             ankle DF/PF   5\"x5 5\"x5 ea 1 1 1       March     1 1 1       ABD/ADD     1 1 1       Knee flexion/extension   1' 2 1 1   1     DW TX - hang in the deep water  5 5' 10' 10' 10' 5,5,5 5' 5' 5' 10,10       DW ABD/ADD  1 1' 1 1 1 1       DW Bicycle  5 5' 5 5 5 5       DW Scissor hip flexion/extension  1 1' 1          DW DKTC  x10 10x 10x ea 1 1 1     Stretches               SKTC  1' 10x 1 1 1       Wall stretches              Calf stretch at wall              Other:            Hot Tub - 10 minutes only  5' 10' 10' 10 10 10                       "

## 2024-10-17 ENCOUNTER — OFFICE VISIT (OUTPATIENT)
Dept: PHYSICAL THERAPY | Age: 39
End: 2024-10-17
Payer: OTHER MISCELLANEOUS

## 2024-10-17 DIAGNOSIS — M53.2X6 LUMBAR SPINE INSTABILITY: Primary | ICD-10-CM

## 2024-10-17 DIAGNOSIS — M54.16 LUMBAR RADICULOPATHY: ICD-10-CM

## 2024-10-17 PROCEDURE — 97113 AQUATIC THERAPY/EXERCISES: CPT

## 2024-10-17 NOTE — PROGRESS NOTES
Daily Note     Today's date: 10/17/2024  Patient name: Sandy Greer  : 1985  MRN: [id number]  Referring provider: Annia Price MD  Dx:   Encounter Diagnosis     ICD-10-CM    1. Lumbar spine instability  M53.2X6       2. Lumbar radiculopathy  M54.16           Start Time: 1005  Stop Time: 1100  Total time in clinic (min): 55 minutes    Subjective: pt notes she felt pretty good after last session. She c/o sig pain upon arrival due to getting in her car wrong this morning. She notes her go to position at home Is her recliner. Min relief noted in the water, she prefers the deeper water.       Objective: See treatment diary below      Assessment: Tolerated treatment fair. Pt does prefer flexed posture and deep water due to increased pain. Encouraging corrective posture to tolerance while in the water. Pt is not progressing w/ ex's due to pain. Working on decreasing pain first in deep water. Pt walks better on land coming into PT, more erect posture and less flexed position but in the water pt flexes forward a lot and struggles to keep corrective posture w/ water walking. Staying flexed and in the water is more comfortable for pt as less pressure is on her back. Pt unable to do piriformis stretches well especially on R LE. Pt would benefit from continued PT      Plan: Continue per plan of care.          POC expires Unit limit Auth Expiration date PT/OT + Visit Limit?   24 BOMN  BOMN                           Visit/Unit Tracking  AUTH Status:  Date 9/25 9/27 10/3 10/9 10/11 10/15 10/17        No auth needed  Used 1 2 3 4 5  7         Remaining                 FOTO                   Precautions:  Daily Treatment Diary     Date 9/25 9/27 10/3 10/9 10/11 10/15 10/17    Patient education JM          Water Walk (FW, BW, side) x10’   5F 5' F reg noodle 5' w/ N 3' w' N 5 2'    LE work at wall               Hip FF/ext swing              Toe/heel   DW 1  1 1 1 1 1      Hip ABD/ADD   DW 1 2 2 2 2 1       "March  DW 1 1 1 1 1 1      Knee flexion & extension  DW 1 1 1 1 1 1      Squats   1        UE noodle x3             Push/pull   1' 1 1 1 1 1      Rotate              Row forward & back   1'           OH side bend            UE/Core (AROM, paddles, MB)              ABD/ADD              Horizontal Pec Fly              Alt. arm swing (shld flex/ext)              Push pull              Single paddle rotation           SLS (EO, EC, ball toss)            Aqua Step (FW, lat, eccentric)            Core work:            Long sit HS stretch    10''x3ea 10\"x3 3x x3ea    Seated piriformis stretches    10''x3ea 10\"x3 3x 3ea      Nerve glide/floss   10x ea x10ea 10x ea 10x     Seated on bench             ankle DF/PF   5\"x5 5\"x5 ea 1 1 1 1      March     1 1 1 1      ABD/ADD     1 1 1 1      Knee flexion/extension   1' 2 1 1   1 1    DW TX - hang in the deep water  5 5' 10' 10' 10' 5,5,5 5' 5' 5' 10,10 5#ea 5,5      DW ABD/ADD  1 1' 1 1 1 1 1      DW Bicycle  5 5' 5 5 5 5 5,5      DW Scissor hip flexion/extension  1 1' 1          DW DKTC  x10 10x 10x ea 1 1 1 1    Stretches               SKTC  1' 10x 1 1 1       Wall stretches              Calf stretch at wall              Other: education        5    Hot Tub - 10 minutes only  5' 10' 10' 10 10 10 10                        "

## 2024-10-22 ENCOUNTER — OFFICE VISIT (OUTPATIENT)
Dept: PHYSICAL THERAPY | Age: 39
End: 2024-10-22
Payer: OTHER MISCELLANEOUS

## 2024-10-22 DIAGNOSIS — M53.2X6 LUMBAR SPINE INSTABILITY: Primary | ICD-10-CM

## 2024-10-22 DIAGNOSIS — M54.16 LUMBAR RADICULOPATHY: ICD-10-CM

## 2024-10-22 PROCEDURE — 97113 AQUATIC THERAPY/EXERCISES: CPT

## 2024-10-22 NOTE — PROGRESS NOTES
Daily Note     Today's date: 10/22/2024  Patient name: Sandy Greer  : 1985  MRN: [id number]  Referring provider: Annia Price MD  Dx:   Encounter Diagnosis     ICD-10-CM    1. Lumbar spine instability  M53.2X6       2. Lumbar radiculopathy  M54.16           Start Time: 1000  Stop Time: 1100  Total time in clinic (min): 60 minutes    Subjective: pt notes no sig change w/ back pain and R LE pain. She does get some relief in the deep water and when she is flexed. Sig pain driving here.       Objective: See treatment diary below      Assessment: Tolerated treatment fair. Working on posture in the water. Added resistive paddles today to work on core and posture. Cueing throughout session for corrective posture. Added backwards walking today which pt showed improved gait w/ a more erect posture than walking forwards. Added gentle extension in DW (7ft) at the ladder and then again in 5ft section. Pain in LB and R side w/ ext in shallower water. Encouraging pt to work on ext at home as well. Patient would benefit from continued PT      Plan: Continue per plan of care.        POC expires Unit limit Auth Expiration date PT/OT + Visit Limit?   24 BOMN  BOMN                           Visit/Unit Tracking  AUTH Status:  Date 9/25 9/27 10/3 10/9 10/11 10/15 10/17 10/22       No auth needed  Used 1 2 3 4 5 6 7 8        Remaining                 FOTO 29/43                  Precautions:  Daily Treatment Diary     Date 9/25 9/27 10/3 10/9 10/11 10/15 10/17 10/22   Patient education JM          Water Walk (FW, BW, side) x10’   5F 5' F reg noodle 5' w/ N 3' w' N 5 2' 4laps f/b   LE work at wall               Hip FF/ext swing         2     Toe/heel   DW 1  1 1 1 1 1 1     Hip ABD/ADD   DW 1 2 2 2 2 1 1     March  DW 1 1 1 1 1 1 1     Knee flexion & extension  DW 1 1 1 1 1 1 1     Squats   1     1   UE noodle x3             Push/pull   1' 1 1 1 1 1 1     Rotate         1     Row forward & back   1'           OH side  "bend            UE/Core (AROM, paddles, MB)         green     ABD/ADD         1     Horizontal Pec Fly         1     Alt. arm swing (shld flex/ext)         1     Push pull         1     Single paddle rotation           SLS (EO, EC, ball toss)            TACHO in ft section        x5   Core work:            Long sit HS stretch    10''x3ea 10\"x3 3x x3ea    Seated piriformis stretches    10''x3ea 10\"x3 3x 3ea      Nerve glide/floss   10x ea x10ea 10x ea 10x     Seated on bench             ankle DF/PF   5\"x5 5\"x5 ea 1 1 1 1      March     1 1 1 1      ABD/ADD     1 1 1 1      Knee flexion/extension   1' 2 1 1   1 1    DW TX - hang in the deep water  5 5' 10' 10' 10' 5,5,5 5' 5' 5' 10,10 5#ea 5,5 5,5     DW ABD/ADD  1 1' 1 1 1 1 1 1     DW Bicycle  5 5' 5 5 5 5 5,5 5,5     DW Scissor hip flexion/extension  1 1' 1          DW DKTC  x10 10x 10x ea 1 1 1 1 1   Stretches               SKTC  1' 10x 1 1 1     DW TACHO        x10     Calf stretch at wall              Other: education        5    Hot Tub - 10 minutes only  5' 10' 10' 10 10 10 10 10                         "

## 2024-10-24 ENCOUNTER — OFFICE VISIT (OUTPATIENT)
Dept: PHYSICAL THERAPY | Age: 39
End: 2024-10-24
Payer: OTHER MISCELLANEOUS

## 2024-10-24 DIAGNOSIS — M54.16 LUMBAR RADICULOPATHY: ICD-10-CM

## 2024-10-24 DIAGNOSIS — M53.2X6 LUMBAR SPINE INSTABILITY: Primary | ICD-10-CM

## 2024-10-24 PROCEDURE — 97113 AQUATIC THERAPY/EXERCISES: CPT | Performed by: PHYSICAL THERAPIST

## 2024-10-24 NOTE — PROGRESS NOTES
"Daily Note     Today's date: 10/24/2024  Patient name: Sandy Greer  : 1985  MRN: [id number]  Referring provider: Annia Price MD  Dx:   Encounter Diagnosis     ICD-10-CM    1. Lumbar spine instability  M53.2X6       2. Lumbar radiculopathy  M54.16                      Subjective: Patient notes she awoke with pain at 10/10 this morning. Some relief with spouse performing leg pull and massage.       Objective: See treatment diary below      Assessment: Tolerated treatment well. Patient would benefit from continued PT Conitnues to favor forward flexed posture while in the water. Corrects for short periods with cueing. Cues for breath sequencing throughout.      Plan: Continue per plan of care.      POC expires Unit limit Auth Expiration date PT/OT + Visit Limit?   24 BOMN  BOMN                           Visit/Unit Tracking  AUTH Status:  Date 9/25 9/27 10/3 10/9 10/11 10/15 10/17 10/22 10/24      No auth needed  Used 1 2 3 4 5 6 7 8 9       Remaining                 FOTO                   Precautions:  Daily Treatment Diary     Date 10/24   10/9 10/11 10/15 10/17 10/22   Patient education           Water Walk (FW, BW, side) x10’  3 laps  3' f/b   5' w/ N 3' w' N 5 2' 4laps f/b   LE work at wall               Hip FF/ext swing  2       2     Toe/heel  1   1 1 1 1 1     Hip ABD/ADD  2   2 2 2 1 1     March 1   1 1 1 1 1     Knee flexion & extension 1   1 1 1 1 1     Squats 1       1   UE noodle x3             Push/pull  1   1 1 1 1 1     Rotate  1       1     Row forward & back              OH side bend            UE/Core (AROM, paddles, MB)  green       green     ABD/ADD  1       1     Horizontal Pec Fly  1       1     Alt. arm swing (shld flex/ext)  1       1     Push pull  1       1     Single paddle rotation           SLS (EO, EC, ball toss)            TACHO in ft section x5       x5   Core work:            Long sit HS stretch    10''x3ea 10\"x3 3x x3ea    Seated piriformis stretches    " "10''x3ea 10\"x3 3x 3ea      Nerve glide/floss    x10ea 10x ea 10x     Seated on bench             ankle DF/PF     1 1 1 1      March     1 1 1 1      ABD/ADD     1 1 1 1      Knee flexion/extension     1 1   1 1    DW TX - hang in the deep water  5' 2' 5'   5,5,5 5' 5' 5' 10,10 5#ea 5,5 5,5     DW ABD/ADD  1   1 1 1 1 1     DW Bicycle  5,5   5 5 5 5,5 5,5     DW Scissor hip flexion/extension              DW DKTC  x10   1 1 1 1 1   Stretches               SKTC    1 1 1     DW TACHO        x10     Calf stretch at wall              Other: education        5    Hot Tub - 10 minutes only  10'   10 10 10 10 10                           "

## 2024-10-30 ENCOUNTER — EVALUATION (OUTPATIENT)
Dept: PHYSICAL THERAPY | Age: 39
End: 2024-10-30
Payer: OTHER MISCELLANEOUS

## 2024-10-30 DIAGNOSIS — M53.2X6 LUMBAR SPINE INSTABILITY: Primary | ICD-10-CM

## 2024-10-30 DIAGNOSIS — M54.16 LUMBAR RADICULOPATHY: ICD-10-CM

## 2024-10-30 PROCEDURE — 97113 AQUATIC THERAPY/EXERCISES: CPT | Performed by: PHYSICAL THERAPIST

## 2024-10-30 NOTE — PROGRESS NOTES
PT Re-Evaluation     Today's date: 10/30/2024  Patient name: Sandy Greer  : 1985  MRN: [id number]  Referring provider: Annia Price MD  Dx:   Encounter Diagnosis     ICD-10-CM    1. Lumbar spine instability  M53.2X6 PT plan of care cert/re-cert      2. Lumbar radiculopathy  M54.16 PT plan of care cert/re-cert          Start Time: 945  Stop Time: 1045  Total time in clinic (min): 60 minutes    Assessment  Impairments: abnormal gait, abnormal muscle firing, abnormal muscle tone, abnormal or restricted ROM, abnormal movement, activity intolerance, impaired balance, impaired physical strength, lacks appropriate home exercise program, pain with function, weight-bearing intolerance, participation limitations and activity limitations  Symptom irritability: high    Assessment details: 10/30/2024, patient demonstrates increased ROM and strength to low back after 10 visits and can now sleep and stand longer. Patient continues to be guarded with high pain levels but notes temporary relief with aquatic therapy.   Barriers to therapy: chronicity  Understanding of Dx/Px/POC: fair     Prognosis: fair    Goals  In 4 visits:   1) Pt will improve FOTO score by 6 to meet LTGs MET  2) Pt will improve 5 x sit to stand by 3-4 seconds to work toward LTGs and show MDC  3) Pt will be able to tolerate a 50 minute PT aquatic tx session to demonstrate improved endurance MET  4) Pts radicular symptoms will centralize for >= 5 days to start lumbar ROM outside of their preferred posture and work toward LTGs.WORKING TOWARDS  5) Pt will be able to walk in the water without the noodle for 5 minutes MET  Complete 2 minute walk test  Provide HEP with nerve glides and piriformis stretching    In 8 visits:   1) Pt will improve TUG from 19 seconds to 12 seconds to decrease risk of falls   2) Pt will improve 5 x sit to stand from 40 seconds to 30 seconds to show LE muscular gains and meet personal goals   3) Pt will improve FOTO score  from 29 pts to 43 pts to demonstrate a measurable change in physical status   4) Pt will demonstrate 100% competency of HEP to be appropriate for D/C from therapy after goals have been met, pt is functioning at PLOF, and/or the pt displays significant improvement.    5) Pt will be able to complete 10 repetitions into all lumbar quadrants without peripheralization of symptoms to be able to extend for items on top shelf  6) Pt will be able to independently set up sciatic nerve glides  7) Pt will be able to independently perform piriformis stretch to be able to put on socks and shoes     In 12 visits:  Pt will improve standing tolerance from 5 minutes to 10 minutes to be able to cook pasta  Pt will return to sleeping in her bed instead of the recliner    Plan  Patient would benefit from: skilled physical therapy  Referral necessary: No    Planned therapy interventions: abdominal trunk stabilization, activity modification, ADL retraining, ADL training, aquatic therapy, balance, balance/weight bearing training, body mechanics training, flexibility, functional ROM exercises, gait training, graded activity, graded exercise, graded motor, home exercise program, IADL retraining, whirlpool, transfer training, therapeutic training, therapeutic exercise, therapeutic activities, strengthening, stretching, manual therapy, massage, neuromuscular re-education and nerve gliding    Frequency: 2x week  Duration in weeks: 6  Plan of Care beginning date: 10/30/2024  Plan of Care expiration date: 12/24/2024  Treatment plan discussed with: patient and PTA  Plan details: Pt was in agreement that they will be treated by myself in combination with a PTA. Further, informed that we work as a team, the PTAs follow the POC created by the PT, and I trust them to make safe and reasonable changes when appropriate under their scope of practice.      Pt was educated on the nature of their dx and the benefits of completing physical therapy.  Additionally, pt is encouraged to ask questions regarding their dx and POC.        Subjective Evaluation    History of Present Illness  Mechanism of injury: trauma  Mechanism of injury: 10/30/2024, patient reports decreased pain and increased ROM and strength after 10 PT visits but still has pain with prolonged sitting          Recurrent probem    Quality of life: good    Patient Goals  Patient goals for therapy: decreased pain, improved balance, increased strength, increased motion, return to sport/leisure activities, return to work and independence with ADLs/IADLs  Patient goal: sleep through the night, return to work,  Pain  Current pain ratin  At best pain ratin  At worst pain rating: 10  Location: low back into b/l buttcheek R>L radiates into her leg down to her foot  Quality: radiating, needle-like, sharp and knife-like  Relieving factors: medications (TENs unit, hot shower, gabapentin, meloxican, methocarbonaly)  Aggravating factors: sitting, standing, stair climbing, walking, lifting and overhead activity  Progression: no change    Social Support  Steps to enter house: yes  2  Stairs in house: yes   5  Lives in: multiple-level home  Lives with: spouse and adult children    Working: disability, workers comp.  Hand dominance: right  Exercise history: tennis, golf      Diagnostic Tests  MRI studies: abnormal  Treatments  Previous treatment: physical therapy  Current treatment: injection treatment      Objective     Static Posture     Lumbar Spine   Increased lordosis.     Active Range of Motion     Lumbar   Flexion: 55 degrees   Extension: 15 degrees   Mechanical Assessment    Cervical      Thoracic      Lumbar    Sitting flexion: repeated movements  Pain location: no change  Standing extension: repeated movements  Pain location: peripheralized  Pain intensity: worse  Pain level: increased  Right sidegliding: repeated movements  Pain location: peripheralized  Pain intensity:worse  Pain level:  increased    Strength/Myotome Testing     Left Hip   Planes of Motion   Flexion: 4  Extension: 4  Abduction: 4+  Adduction: 4+    Right Hip   Planes of Motion   Flexion: 4-  Extension: 4-  Abduction: 4  Adduction: 4+    Left Knee   Flexion: 4+  Extension: 4    Right Knee   Flexion: 4+  Extension: 4-    Left Ankle/Foot   Dorsiflexion: 4  Plantar flexion: 4    Right Ankle/Foot   Dorsiflexion: 4-  Plantar flexion: 4    Tests     Lumbar     Left   Negative slump test.     Right   Positive slump test.     Ambulation     Observational Gait   Gait: antalgic and crouched     Functional Assessment        Comments  5 x sit to stand: 41 seconds   -- armrests  -- bent over     mCTSIB: passed condition #1, unable to perform conditions 2-4    TU seconds  -- armrests for pushoff      Flowsheet Rows      Flowsheet Row Most Recent Value   PT/OT G-Codes    Current Score 29   Projected Score 43                   POC expires Unit limit Auth Expiration date PT/OT + Visit Limit?   24 BOMN  BOMN                           Visit/Unit Tracking  AUTH Status:  Date 9/25 9/27 10/3 10/9 10/11 10/15 10/17 10/22 10/24 10/30     No auth needed  Used 1 2 3 4 5 6 7 8 9 10      Remaining                 FOTO                   Precautions:  Daily Treatment Diary     Date 10/24 10/30  10/9 10/11 10/15 10/17 10/22   Patient education           Water Walk (FW, BW, side) x10’  3 laps  3' f/b 5  5' w/ N 3' w' N 5 2' 4laps f/b   LE work at wall               Hip FF/ext swing  2 2      2     Toe/heel  1 1  1 1 1 1 1     Hip ABD/ADD  2 2  2 2 2 1 1     March 1 1  1 1 1 1 1     Knee flexion & extension 1 1  1 1 1 1 1     Squats 1 1      1   UE noodle x3             Push/pull  1 1  1 1 1 1 1     Rotate  1 1      1     Row forward & back              OH side bend            UE/Core (AROM, paddles, MB)  green       green     ABD/ADD  1 1      1     Horizontal Pec Fly  1 1      1     Alt. arm swing (shld flex/ext)  1 1      1     Push pull  1 1       "1     Single paddle rotation           SLS (EO, EC, ball toss)            TACHO in ft section x5       x5   Core work:            Long sit HS stretch    10''x3ea 10\"x3 3x x3ea    Seated piriformis stretches    10''x3ea 10\"x3 3x 3ea      Nerve glide/floss    x10ea 10x ea 10x     Seated on bench             ankle DF/PF     1 1 1 1      March     1 1 1 1      ABD/ADD     1 1 1 1      Knee flexion/extension     1 1   1 1    DW TX - hang in the deep water  5' 2' 5' 10  5,5,5 5' 5' 5' 10,10 5#ea 5,5 5,5     DW ABD/ADD  1 1  1 1 1 1 1     DW Bicycle  5,5 10  5 5 5 5,5 5,5     DW Scissor hip flexion/extension              DW DKTC  x10   1 1 1 1 1   Stretches               SKTC    1 1 1     DW TACHO        x10     Calf stretch at wall              Other: education        5    Hot Tub - 10 minutes only  10' 10  10 10 10 10 10                             Manuals                                                                 Neuro Re-Ed                                                                                                        Ther Ex                                                                                                                     Ther Activity                                       Gait Training                                       Modalities                                            "

## 2024-10-31 ENCOUNTER — TELEPHONE (OUTPATIENT)
Dept: PAIN MEDICINE | Facility: CLINIC | Age: 39
End: 2024-10-31

## 2024-10-31 NOTE — TELEPHONE ENCOUNTER
S/w pt and advised of same. Pt states WC/Neurology advising of EMG and pt currently sched. Pt states needing to r/s SIJ after EMG. Pt also waiting on advisement from NS to see if a sx proc necessary. RN advised pt to update office once EMG completed and NS reviews. Pt verbalized understanding and apprec of call.     Sched--> Pls cx proc on 11/4/24 and ro for r/s. Thank you!

## 2024-10-31 NOTE — TELEPHONE ENCOUNTER
Caller: Sandy     Doctor: Albert    Reason for call: Dr. Jeff recommending patient to get EMG before she gets epidural on 11/4 please advise he is W/C physician    Call back#: [phone]

## 2024-11-06 ENCOUNTER — OFFICE VISIT (OUTPATIENT)
Dept: PHYSICAL THERAPY | Age: 39
End: 2024-11-06
Payer: OTHER MISCELLANEOUS

## 2024-11-06 DIAGNOSIS — M54.16 LUMBAR RADICULOPATHY: ICD-10-CM

## 2024-11-06 DIAGNOSIS — M53.2X6 LUMBAR SPINE INSTABILITY: Primary | ICD-10-CM

## 2024-11-06 PROCEDURE — 97150 GROUP THERAPEUTIC PROCEDURES: CPT

## 2024-11-06 PROCEDURE — 97113 AQUATIC THERAPY/EXERCISES: CPT

## 2024-11-06 NOTE — PROGRESS NOTES
Daily Note     Today's date: 2024  Patient name: Sandy Greer  : 1985  MRN: [id number]  Referring provider: Annia Price MD  Dx:   Encounter Diagnosis     ICD-10-CM    1. Lumbar spine instability  M53.2X6       2. Lumbar radiculopathy  M54.16                      Subjective: patient reports that she is scheduling for an injection and is hoping it is soon.       Objective: See treatment diary below      Assessment: Tolerated treatment fair throughout session, constant verbal cueing for ex technique and her posture remains in flexed posture. No relief with TE. Patient demonstrated fatigue post treatment and would benefit from continued PT      Plan: Continue per plan of care.  Progress treatment as tolerated.      Patient seen 1:1 for 30 minutes and rest of time group setting.       POC expires Unit limit Auth Expiration date PT/OT + Visit Limit?   24 BOMN  BOMN                           Visit/Unit Tracking  AUTH Status:  Date 9/25 9/27 10/3 10/9 10/11 10/15 10/17 10/22 10/24 10/30 11/6    No auth needed  Used 1 2 3 4 5 6 7 8 9 10 11     Remaining                 FOTO                   Precautions:  Daily Treatment Diary     Date 10/24 10/30 11/6  10/11 10/15 10/17 10/22   Patient education           Water Walk (FW, BW, side) x10’  3 laps  3' f/b 5 2 laps 5' w/ N 3' w' N 5 2' 4laps f/b   LE work at wall               Hip FF/ext swing  2 2 2     2     Toe/heel  1 1 1 1 1 1 1 1     Hip ABD/ADD  2 2 2 2 2 2 1 1     March 1 1 1 1 1 1 1 1     Knee flexion & extension 1 1 1 1 1 1 1 1     Squats 1 1 1     1   UE noodle x3             Push/pull  1 1 1 1 1 1 1 1     Rotate  1 1      1     Row forward & back              OH side bend            UE/Core (AROM, paddles, MB)  green  green     green     ABD/ADD  1 1 1     1     Horizontal Pec Fly  1 1 1     1     Alt. arm swing (shld flex/ext)  1 1 1     1     Push pull  1 1 1     1     Single paddle rotation           SLS (EO, EC, ball toss)           "  TACHO in ft section x5       x5   Core work:            Long sit HS stretch    10''x3ea 10\"x3 3x x3ea    Seated piriformis stretches    10''x3ea 10\"x3 3x 3ea      Nerve glide/floss    x10ea 10x ea 10x     Seated on bench             ankle DF/PF     1 1 1 1      March     1 1 1 1      ABD/ADD     1 1 1 1      Knee flexion/extension     1 1   1 1    DW TX - hang in the deep water  5' 2' 5' 10 10 5,5,5 5' 5' 5' 10,10 5#ea 5,5 5,5     DW ABD/ADD  1 1 1 1 1 1 1 1     DW Bicycle  5,5 10 5' 5' 5 5 5 5,5 5,5     DW Scissor hip flexion/extension              DW DKTC  x10  10x 1 1 1 1 1   Stretches               SKTC    1 1 1     DW TACHO        x10     Calf stretch at wall              Other: education        5    Hot Tub - 10 minutes only  10' 10 10 10 10 10 10 10                             Manuals                                                                 Neuro Re-Ed                                                                                                        Ther Ex                                                                                                                     Ther Activity                                       Gait Training                                       Modalities                                            "

## 2024-11-10 NOTE — PROGRESS NOTES
Daily Note     Today's date: 2024  Patient name: Sandy Greer  : 1985  MRN: [id number]  Referring provider: Annia Price MD  Dx:   Encounter Diagnosis     ICD-10-CM    1. Lumbar spine instability  M53.2X6       2. Lumbar radiculopathy  M54.16           Start Time: 0730  Stop Time: 0830  Total time in clinic (min): 60 minutes    Subjective: Patient notes she has been up since 3 Am due to the pain. Also troubled by gall bladder pain for which she indicates she needs surgery. Patient to have injections next week.       Objective: See treatment diary below      Assessment: Tolerated treatment fair. Patient demonstrated fatigue post treatment and would benefit from continued PTContinues with forward flexed posture with ambulation.       Plan: Continue per plan of care. Advance program as able.     POC expires Unit limit Auth Expiration date PT/OT + Visit Limit?   24 BOMN  BOMN                           Visit/Unit Tracking  AUTH Status:  Date 9/25 9/27 10/3 10/9 10/11 10/15 10/17 10/22 10/24 10/30 11/6 11/11   No auth needed  Used 1 2 3 4 5 6 7 8 9 10 11 12    Remaining                 FOTO                   Precautions:  Daily Treatment Diary     Date 10/24 10/30 11/6 11/11  10/15 10/17 10/22   Patient education           Water Walk (FW, BW, side) x10’  3 laps  3' f/b 5 2 laps 3 laps  5 2' 4laps f/b   LE work at wall               Hip FF/ext swing  2 2 2 2    2     Toe/heel  1 1 1 1  1 1 1     Hip ABD/ADD  2 2 2 2  2 1 1     March 1 1 1 1  1 1 1     Knee flexion & extension 1 1 1 1  1 1 1     Squats 1 1 1 1    1   UE noodle x3             Push/pull  1 1 1   1 1 1     Rotate  1 1      1     Row forward & back              OH side bend            UE/Core (AROM, paddles, MB)  green  green green    green     ABD/ADD  1 1 1 1    1     Horizontal Pec Fly  1 1 1 1    1     Alt. arm swing (shld flex/ext)  1 1 1 1    1     Push pull  1 1 1 1    1     Single paddle rotation           SLS (EO, EC,  ball toss)            TACHO in ft section x5   7foot  10x      x5   Core work:            Long sit HS stretch    10''x3ea  3x x3ea    Seated piriformis stretches    10''x3ea  3x 3ea      Nerve glide/floss      10x     Seated on bench             ankle DF/PF       1 1      March       1 1      ABD/ADD       1 1      Knee flexion/extension       1 1    DW TX - hang in the deep water  5' 2' 5' 10 10 5,'7'  10,10 5#ea 5,5 5,5     DW ABD/ADD  1 1 1 1  1 1 1     DW Bicycle  5,5 10 5' 5' 5  5 5,5 5,5     DW Scissor hip flexion/extension              DW DKTC  x10  10x 1  1 1 1   Stretches               SKTC    1  1     DW TACHO        x10     Calf stretch at wall              Other: education        5    Hot Tub - 10 minutes only  10' 10 10 10  10 10 10                             Manuals                                                                 Neuro Re-Ed                                                                                                        Ther Ex                                                                                                                     Ther Activity                                       Gait Training                                       Modalities

## 2024-11-11 ENCOUNTER — OFFICE VISIT (OUTPATIENT)
Dept: PHYSICAL THERAPY | Age: 39
End: 2024-11-11
Payer: OTHER MISCELLANEOUS

## 2024-11-11 DIAGNOSIS — M53.2X6 LUMBAR SPINE INSTABILITY: Primary | ICD-10-CM

## 2024-11-11 DIAGNOSIS — M54.16 LUMBAR RADICULOPATHY: ICD-10-CM

## 2024-11-11 PROCEDURE — 97113 AQUATIC THERAPY/EXERCISES: CPT | Performed by: PHYSICAL THERAPIST

## 2024-11-13 ENCOUNTER — APPOINTMENT (OUTPATIENT)
Dept: PHYSICAL THERAPY | Age: 39
End: 2024-11-13
Payer: OTHER MISCELLANEOUS

## 2024-11-18 ENCOUNTER — APPOINTMENT (OUTPATIENT)
Dept: PHYSICAL THERAPY | Age: 39
End: 2024-11-18
Payer: OTHER MISCELLANEOUS

## 2024-11-18 ENCOUNTER — HOSPITAL ENCOUNTER (OUTPATIENT)
Dept: RADIOLOGY | Facility: HOSPITAL | Age: 39
Discharge: HOME | End: 2024-11-18
Attending: STUDENT IN AN ORGANIZED HEALTH CARE EDUCATION/TRAINING PROGRAM
Payer: OTHER MISCELLANEOUS

## 2024-11-18 VITALS
HEART RATE: 97 BPM | SYSTOLIC BLOOD PRESSURE: 132 MMHG | TEMPERATURE: 97.4 F | OXYGEN SATURATION: 97 % | RESPIRATION RATE: 18 BRPM | DIASTOLIC BLOOD PRESSURE: 85 MMHG

## 2024-11-18 DIAGNOSIS — M46.1 SACROILIITIS (HCC): ICD-10-CM

## 2024-11-18 PROBLEM — M53.3 PAIN OF BOTH SACROILIAC JOINTS: Status: ACTIVE | Noted: 2024-11-18

## 2024-11-18 PROCEDURE — 27096 INJECT SACROILIAC JOINT: CPT | Performed by: STUDENT IN AN ORGANIZED HEALTH CARE EDUCATION/TRAINING PROGRAM

## 2024-11-18 RX ORDER — METHYLPREDNISOLONE ACETATE 40 MG/ML
40 INJECTION, SUSPENSION INTRA-ARTICULAR; INTRALESIONAL; INTRAMUSCULAR; PARENTERAL; SOFT TISSUE ONCE
Status: COMPLETED | OUTPATIENT
Start: 2024-11-18 | End: 2024-11-18

## 2024-11-18 RX ORDER — ROPIVACAINE HYDROCHLORIDE 2 MG/ML
2 INJECTION, SOLUTION EPIDURAL; INFILTRATION; PERINEURAL ONCE
Status: COMPLETED | OUTPATIENT
Start: 2024-11-18 | End: 2024-11-18

## 2024-11-18 RX ORDER — LIDOCAINE HYDROCHLORIDE 10 MG/ML
5 INJECTION, SOLUTION EPIDURAL; INFILTRATION; INTRACAUDAL; PERINEURAL ONCE
Status: COMPLETED | OUTPATIENT
Start: 2024-11-18 | End: 2024-11-18

## 2024-11-18 RX ADMIN — METHYLPREDNISOLONE ACETATE 40 MG: 40 INJECTION, SUSPENSION INTRA-ARTICULAR; INTRALESIONAL; INTRAMUSCULAR; PARENTERAL; SOFT TISSUE at 08:34

## 2024-11-18 RX ADMIN — ROPIVACAINE HYDROCHLORIDE 2 ML: 2 INJECTION, SOLUTION EPIDURAL; INFILTRATION; PERINEURAL at 08:34

## 2024-11-18 RX ADMIN — LIDOCAINE HYDROCHLORIDE 5 ML: 10 INJECTION, SOLUTION EPIDURAL; INFILTRATION; INTRACAUDAL; PERINEURAL at 08:33

## 2024-11-18 RX ADMIN — IOHEXOL 1 ML: 300 INJECTION, SOLUTION INTRAVENOUS at 08:34

## 2024-11-18 NOTE — DISCHARGE INSTR - LAB

## 2024-11-18 NOTE — H&P
History of Present Illness: The patient is a 39 y.o. female who presents with complaints of low back pain.    Past Medical History:   Diagnosis Date    Brain aneurysm        Past Surgical History:   Procedure Laterality Date    BRAIN SURGERY           Current Outpatient Medications:     acetaminophen (TYLENOL) 500 mg tablet, Take 500 mg by mouth every 6 (six) hours as needed, Disp: , Rfl:     Butalbital-APAP-Caffeine (Fioricet) -40 MG CAPS, Take 1 to 2 tablets every 4 hours as needed, do not take more than 6 tablets in a day., Disp: 20 capsule, Rfl: 0    gabapentin (NEURONTIN) 300 mg capsule, Take 300 mg by mouth daily at bedtime, Disp: , Rfl:     Meloxicam 10 MG CAPS, , Disp: , Rfl:     methocarbamol (ROBAXIN) 500 mg tablet, Take 1 tablet (500 mg total) by mouth 3 (three) times a day as needed for muscle spasms, Disp: 90 tablet, Rfl: 0    predniSONE 20 mg tablet, Take 20 mg by mouth daily (Patient not taking: Reported on 12/19/2023), Disp: , Rfl:     No Known Allergies    Physical Exam:   Vitals:    11/18/24 0813   BP: 134/84   Pulse: 73   Resp: 18   Temp: (!) 97.4 °F (36.3 °C)   SpO2: 99%     General: Awake, Alert, Oriented x 3, Mood and affect appropriate  Respiratory: Respirations even and unlabored  Cardiovascular: Peripheral pulses intact; no edema  Musculoskeletal Exam: uneven gait    ASA Score: 2    Patient/Chart Verification  Patient ID Verified: Verbal  ID Band Applied: No  Consents Confirmed: Procedural, To be obtained in the Pre-Procedure area  H&P( within 30 days) Verified: To be obtained in the Procedural area  Interval H&P(within 24 hr) Complete (required for Outpatients and Surgery Admit only): To be obtained in the Procedural area  Allergies Reviewed: Yes  Anticoag/NSAID held?: NA  Currently on antibiotics?: No  Pregnancy denied?: Yes (pt denies)    Assessment:   1. Sacroiliitis (HCC)        Plan: Bilateral SI injection

## 2024-11-20 ENCOUNTER — OFFICE VISIT (OUTPATIENT)
Dept: PHYSICAL THERAPY | Age: 39
End: 2024-11-20
Payer: OTHER MISCELLANEOUS

## 2024-11-20 DIAGNOSIS — M53.2X6 LUMBAR SPINE INSTABILITY: Primary | ICD-10-CM

## 2024-11-20 PROCEDURE — 97150 GROUP THERAPEUTIC PROCEDURES: CPT

## 2024-11-20 PROCEDURE — 97113 AQUATIC THERAPY/EXERCISES: CPT

## 2024-11-20 NOTE — PROGRESS NOTES
Daily Note     Today's date: 2024  Patient name: Sandy Greer  : 1985  MRN: [id number]  Referring provider: Annia Price MD  Dx:   Encounter Diagnosis     ICD-10-CM    1. Lumbar spine instability  M53.2X6           Start Time: 0800  Stop Time: 0900  Total time in clinic (min): 60 minutes    Subjective: pt notes she had an injection and is hoping to get some relief from this injection. She continues w/ R LE pain mostly in glutes and SI area.  She states she got another scrip for PT. She notes she needs to leave early for another appt today.       Objective: See treatment diary below  Pt seen 1:1 for 30 min and group therapy for remainder    Assessment: Tolerated treatment fair. Pt continues w/ sig forward flexed posture in the water w/ water walking. Cueing to correct posture. Pt is able to correct but poor tolerance to maintain. Pt walked into PT w/ better posture than when she is in the water. Pt gets good relief w/ forward flexed position and in deeper water. Pt is not making much progress w/ aquatic PT. Limited ex's due to pt trying to stay in a flexed position w/ ex's and her poor tolerance to maintain corrective posture even in chest deep water. We have tried only DW in 7ft section and was able to progress pt to standing in 5ft section but w/ limited ex's due to poor tolerance for standing erect. Pt did have an injection with minimal relief so far. Pt is to be discharged next visit as per PT's POC due to limited relief w/ PT and limited progression w/ PT since pt started 24.       Plan: Potential discharge next visit.       POC expires Unit limit Auth Expiration date PT/OT + Visit Limit?   24 BOMN  BOMN                           Visit/Unit Tracking  AUTH Status:  Date 11/20  10/3 10/9 10/11 10/15 10/17 10/22 10/24 10/30 11/6 11/11   No auth needed  Used 13  3 4 5 6 7 8 9 10 11 12    Remaining                 FOTO                   Precautions:  Daily Treatment Diary     Date  10/24 10/30 11/6 11/11 11/20 10/15 10/17 10/22   Patient education           Water Walk (FW, BW, side) x10’  3 laps  3' f/b 5 2 laps 3 laps 5' 5 2' 4laps f/b   LE work at wall               Hip FF/ext swing  2 2 2 2 2   2     Toe/heel  1 1 1 1 1 1 1 1     Hip ABD/ADD  2 2 2 2 2 2 1 1     March 1 1 1 1 1 1 1 1     Knee flexion & extension 1 1 1 1 1 1 1 1     Squats 1 1 1 1 1   1   UE noodle x3             Push/pull  1 1 1   1 1 1     Rotate  1 1      1     Row forward & back              OH side bend            UE/Core (AROM, paddles, MB)  green  green green g   green     ABD/ADD  1 1 1 1 1   1     Horizontal Pec Fly  1 1 1 1 1   1     Alt. arm swing (shld flex/ext)  1 1 1 1 1   1     Push pull  1 1 1 1 1   1     Single paddle rotation           SLS (EO, EC, ball toss)            TACHO in ft section x5   7foot  10x      x5   Core work:            Long sit HS stretch    10''x3ea  3x x3ea    Seated piriformis stretches    10''x3ea  3x 3ea      Nerve glide/floss      10x     Seated on bench             ankle DF/PF       1 1 March 1 1      ABD/ADD       1 1      Knee flexion/extension       1 1    DW TX - hang in the deep water  5' 2' 5' 10 10 5,'7' 5,5 10,10 5#ea 5,5 5,5     DW ABD/ADD  1 1 1 1 1,1 1 1 1     DW Bicycle  5,5 10 5' 5' 5 5,3 5 5,5 5,5     DW Scissor hip flexion/extension      1        DW DKTC  x10  10x 1 1 1 1 1   Stretches               SKTC    1 1 1     DW TACHO        x10     Calf stretch at wall              Other: education        5    Hot Tub - 10 minutes only  10' 10 10 10 nt 10 10 10                             Manuals                                                                 Neuro Re-Ed                                                                                                        Ther Ex                                                                                                                     Ther Activity                                       Gait Training                                        Modalities

## 2024-11-21 ENCOUNTER — OFFICE VISIT (OUTPATIENT)
Dept: PHYSICAL THERAPY | Age: 39
End: 2024-11-21
Payer: OTHER MISCELLANEOUS

## 2024-11-21 DIAGNOSIS — M53.2X6 LUMBAR SPINE INSTABILITY: Primary | ICD-10-CM

## 2024-11-21 DIAGNOSIS — M54.16 LUMBAR RADICULOPATHY: ICD-10-CM

## 2024-11-21 PROCEDURE — 97113 AQUATIC THERAPY/EXERCISES: CPT

## 2024-11-21 NOTE — PROGRESS NOTES
Daily Note     Today's date: 2024  Patient name: Sandy Greer  : 1985  MRN: [id number]  Referring provider: Annia Price MD  Dx:   Encounter Diagnosis     ICD-10-CM    1. Lumbar spine instability  M53.2X6       2. Lumbar radiculopathy  M54.16           Start Time: 0800  Stop Time: 0900  Total time in clinic (min): 60 minutes    Subjective: Patient reports that she feeling better since her injection.       Objective: See treatment diary below      Assessment: Tolerated treatment fairly well today, still has forward flexed posture, some improvements with pool tolerance but minimal. Patient demonstrated fatigue post treatment and exhibited good technique with therapeutic exercises      Plan: Patient D/C today to independent HEP and was offered step down/fitness program. Primary therapist will D/C orders.        POC expires Unit limit Auth Expiration date PT/OT + Visit Limit?   24 BOMN  BOMN                           Visit/Unit Tracking  AUTH Status:  Date 11/20 11/21  10/9 10/11 10/15 10/17 10/22 10/24 10/30 11/6 11/11   No auth needed  Used 13  3 4 5 6 7 8 9 10 11 12    Remaining                 FOTO                   Precautions:  Daily Treatment Diary     Date 10/24 10/30 11/6 11/11 11/20 11/21  10/22   Patient education           Water Walk (FW, BW, side) x10’  3 laps  3' f/b 5 2 laps 3 laps 5' 5 2' 4laps f/b   LE work at wall               Hip FF/ext swing  2 2 2 2 2 2  2     Toe/heel  1 1 1 1 1 1 1 1     Hip ABD/ADD  2 2 2 2 2 2 1 1     March 1 1 1 1 1 1 1 1     Knee flexion & extension 1 1 1 1 1 1 1 1     Squats 1 1 1 1 1   1   UE noodle x3             Push/pull  1 1 1   1 1 1     Rotate  1 1      1     Row forward & back              OH side bend            UE/Core (AROM, paddles, MB)  green  green green g green  green     ABD/ADD  1 1 1 1 1 1  1     Horizontal Pec Fly  1 1 1 1 1 1  1     Alt. arm swing (shld flex/ext)  1 1 1 1 1 1  1     Push pull  1 1 1 1 1 1  1     Single paddle  rotation           SLS (EO, EC, ball toss)            TACHO in ft section x5   7foot  10x    10x   x5   Core work:            Long sit HS stretch    10''x3ea   x3ea    Seated piriformis stretches    10''x3ea  3x 3ea      Nerve glide/floss           Seated on bench             ankle DF/PF        1      March 1      ABD/ADD        1      Knee flexion/extension        1    DW TX - hang in the deep water  5' 2' 5' 10 10 5,'7' 5,5 5' 5' 5' 5,5 5,5     DW ABD/ADD  1 1 1 1 1,1 1 1 1 1     DW Bicycle  5,5 10 5' 5' 5 5,3 5 5 5,5 5,5     DW Scissor hip flexion/extension      1 1       DW DKTC  x10  10x 1 1 1 1 1   Stretches               SKTC    1 1 1     DW TACHO        x10     Calf stretch at wall              Other: education        5    Hot Tub - 10 minutes only  10' 10 10 10 nt 10 10 10                             Manuals                                                                 Neuro Re-Ed                                                                                                        Ther Ex                                                                                                                     Ther Activity                                       Gait Training                                       Modalities

## 2024-11-22 NOTE — PROGRESS NOTES
Patient to D/C to HEP/fitness program ,partial goals met, At this time, patient has achieved their maximum functional benefit from skilled physical therapy services and will be discharged to their HEP.  Patient is in agreement with the plan of care.  As a result, patient is discharged from physical therapy.

## 2024-11-25 ENCOUNTER — APPOINTMENT (OUTPATIENT)
Dept: PHYSICAL THERAPY | Age: 39
End: 2024-11-25
Payer: OTHER MISCELLANEOUS

## 2024-11-27 ENCOUNTER — APPOINTMENT (OUTPATIENT)
Dept: PHYSICAL THERAPY | Age: 39
End: 2024-11-27
Payer: OTHER MISCELLANEOUS

## 2024-12-02 ENCOUNTER — TELEPHONE (OUTPATIENT)
Dept: PAIN MEDICINE | Facility: CLINIC | Age: 39
End: 2024-12-02

## 2024-12-09 NOTE — ASSESSMENT & PLAN NOTE
2nd opinion/new evaluation of LBP/buttock pain with right foot weakness  Started 1 year ago after lifting a bucket of water. Pain is in the center of the low back and will radiates to the buttocks R>L. There is numbness and tingling in the lateral thigh/calf on the right with weakness in the right foot for over 6 months. No BBI.  Has tried PT and aquatherapy as well as bilateral SI joint injection   Exam: JESUS positive on the right, bilaterally positive thigh thrust and distraction.  Midline spinal TTP and pain with ROM.  BLE 5/5 except right DF 4 -/5.  LT intact, 2+ DTRs, no clonus    Imaging:  MRI lumbar 6/7/24: Mild lumbar spondylosis. Small disc protrusions at L3-L4, L4-L5 and L5-S1. Question of L5 nerve root impingement laterally by the disc osteophyte complex. The imaging quality is degraded by patient motion.  Pathology may be obscured     Plan:  Reviewed current symptoms and prior MRI with patient.  Her MRI is motion degraded and better quality is needed for further evaluation.  Discussed that her pain/symptoms may be multifactorial including coming from her SI joint as well as possible lumbar radiculopathy vs possible peroneal neuropathy (foot weakness).  She did have an EMG done at an outside facility where she follows with a physiatrist.  Will obtain final report/impression.  She is going to be having gallbladder/hernia surgery tomorrow, so would recommend waiting until she is fully recovered prior to proceeding with work up, likely least a month from now  Reviewed red flag signs and symptoms.  Recommend upright lumbar spine XR with flexion/extension views and MRI lumbar spine then return to see Dr. Zamudio per patient request  Call sooner with any questions or concerns.    Orders:    XR spine lumbar complete w bending minimum 6 views; Future    MRI lumbar spine wo contrast; Future

## 2024-12-10 ENCOUNTER — TELEPHONE (OUTPATIENT)
Dept: NEUROSURGERY | Facility: CLINIC | Age: 39
End: 2024-12-10

## 2024-12-10 ENCOUNTER — OFFICE VISIT (OUTPATIENT)
Dept: NEUROSURGERY | Facility: CLINIC | Age: 39
End: 2024-12-10
Payer: OTHER MISCELLANEOUS

## 2024-12-10 VITALS
OXYGEN SATURATION: 98 % | DIASTOLIC BLOOD PRESSURE: 88 MMHG | HEIGHT: 69 IN | BODY MASS INDEX: 33.47 KG/M2 | RESPIRATION RATE: 13 BRPM | HEART RATE: 69 BPM | WEIGHT: 226 LBS | TEMPERATURE: 98.5 F | SYSTOLIC BLOOD PRESSURE: 126 MMHG

## 2024-12-10 DIAGNOSIS — M54.16 LUMBAR RADICULOPATHY: Primary | ICD-10-CM

## 2024-12-10 PROCEDURE — 99204 OFFICE O/P NEW MOD 45 MIN: CPT | Performed by: PHYSICIAN ASSISTANT

## 2024-12-10 NOTE — PROGRESS NOTES
Name: Sandy Greer      : 1985      MRN: [id number]  Encounter Provider: Tanya Maldonado PA-C  Encounter Date: 12/10/2024   Encounter department: [facility] [place]  :  Assessment & Plan  Lumbar radiculopathy  2nd opinion/new evaluation of LBP/buttock pain with right foot weakness  Started 1 year ago after lifting a bucket of water. Pain is in the center of the low back and will radiates to the buttocks R>L. There is numbness and tingling in the lateral thigh/calf on the right with weakness in the right foot for over 6 months. No BBI.  Has tried PT and aquatherapy as well as bilateral SI joint injection   Exam: JESUS positive on the right, bilaterally positive thigh thrust and distraction.  Midline spinal TTP and pain with ROM.  BLE 5/5 except right DF 4 -/5.  LT intact, 2+ DTRs, no clonus    Imaging:  MRI lumbar 24: Mild lumbar spondylosis. Small disc protrusions at L3-L4, L4-L5 and L5-S1. Question of L5 nerve root impingement laterally by the disc osteophyte complex. The imaging quality is degraded by patient motion.  Pathology may be obscured     Plan:  Reviewed current symptoms and prior MRI with patient.  Her MRI is motion degraded and better quality is needed for further evaluation.  Discussed that her pain/symptoms may be multifactorial including coming from her SI joint as well as possible lumbar radiculopathy vs possible peroneal neuropathy (foot weakness).  She did have an EMG done at an outside facility where she follows with a physiatrist.  Will obtain final report/impression.  She is going to be having gallbladder/hernia surgery tomorrow, so would recommend waiting until she is fully recovered prior to proceeding with work up, likely least a month from now  Reviewed red flag signs and symptoms.  Recommend upright lumbar spine XR with flexion/extension views and MRI lumbar spine then return to see Dr. Zamudio per patient request  Call sooner with any questions  or concerns.    Orders:    XR spine lumbar complete w bending minimum 6 views; Future    MRI lumbar spine wo contrast; Future        History of Present Illness   39 year old female seen for evaluation of back/buttock pain with right foot weakness. States that 1 year ago she was picking up trash cans of water and developed pain in her back. The pain is in the center of the low back that feels like a knife is twisting.  The pain will radiate out to the buttocks bilaterally, much worse on the right.  The pain comes on with sitting too long. She is ok for a little while standing and walking, but has to change positions.  Also she is very stiff in the mornings and feels like she cannot stand up straight.  Reports that the right foot is weaker and sometimes she catches her toe when walking.  This has been going on for at least 6 months. There is some tingling in the right lateral thigh and calf with numbness in the foot and toes. Reports that the no left lower extremity complaints or BBI. She is doing PT and aquatherapy, traction seems to help a bit. She underwent bilateral SI joint injections that helped the buttock pain a little bit. Reports having an outside EMG of her lower extremities, will obtain a report.       Review of Systems   Constitutional:  Positive for fatigue.   HENT:  Negative for trouble swallowing.    Gastrointestinal: Negative.  Negative for anal bleeding.   Genitourinary: Negative.    Musculoskeletal:  Positive for back pain (lbp radiates to right buttock occ left) and gait problem (occ unsteady). Negative for myalgias.   Neurological:  Positive for weakness (right foot) and numbness (right leg and foot).   Hematological:  Does not bruise/bleed easily.   Psychiatric/Behavioral:  Positive for sleep disturbance (due to pain).     I have personally reviewed the MA's review of systems and made changes as necessary.    Past Medical History   Past Medical History:   Diagnosis Date    Brain aneurysm   "    Past Surgical History:   Procedure Laterality Date    BRAIN SURGERY       Family History   Problem Relation Age of Onset    Aneurysm Mother     Heart attack Mother     Schizophrenia Father     Diabetes Father       reports that she has never smoked. She has never used smokeless tobacco. She reports that she does not currently use alcohol. She reports that she does not use drugs.  Current Outpatient Medications on File Prior to Visit   Medication Sig Dispense Refill    acetaminophen (TYLENOL) 500 mg tablet Take 500 mg by mouth every 6 (six) hours as needed      Butalbital-APAP-Caffeine (Fioricet) -40 MG CAPS Take 1 to 2 tablets every 4 hours as needed, do not take more than 6 tablets in a day. 20 capsule 0    gabapentin (NEURONTIN) 300 mg capsule Take 300 mg by mouth daily at bedtime      Meloxicam 10 MG CAPS       methocarbamol (ROBAXIN) 500 mg tablet Take 1 tablet (500 mg total) by mouth 3 (three) times a day as needed for muscle spasms 90 tablet 0    predniSONE 20 mg tablet Take 20 mg by mouth daily (Patient not taking: Reported on 12/19/2023)       No current facility-administered medications on file prior to visit.   No Known Allergies   Social History     Tobacco Use    Smoking status: Never    Smokeless tobacco: Never   Vaping Use    Vaping status: Never Used   Substance and Sexual Activity    Alcohol use: Not Currently     Comment: rare    Drug use: Never    Sexual activity: Not on file        Objective   /88 (BP Location: Right arm, Patient Position: Sitting, Cuff Size: Standard)   Pulse 69   Temp 98.5 °F (36.9 °C) (Temporal)   Resp 13   Ht 5' 9\" (1.753 m)   Wt 103 kg (226 lb)   SpO2 98%   BMI 33.37 kg/m²     Physical Exam  Vitals reviewed.   Constitutional:       General: She is awake.      Appearance: Normal appearance.   HENT:      Head: Normocephalic and atraumatic.   Eyes:      Conjunctiva/sclera: Conjunctivae normal.   Cardiovascular:      Rate and Rhythm: Normal rate. "   Pulmonary:      Effort: Pulmonary effort is normal.   Musculoskeletal:      Comments: Midline spinal and bilateral SI joint TTP, pain with flexion/extension   JESUS + on the right, + thigh thrust bilaterally, +distraction   Skin:     General: Skin is warm and dry.   Neurological:      Mental Status: She is alert.      Deep Tendon Reflexes:      Reflex Scores:       Patellar reflexes are 2+ on the right side and 2+ on the left side.  Psychiatric:         Attention and Perception: Attention and perception normal.         Mood and Affect: Mood and affect normal.         Speech: Speech normal.         Behavior: Behavior normal. Behavior is cooperative.         Thought Content: Thought content normal.         Cognition and Memory: Cognition and memory normal.         Judgment: Judgment normal.       Neurological Exam  Mental Status  Awake and alert. Memory is normal. Speech is normal. Language is fluent with no aphasia. Attention and concentration are normal.    Motor                                               Right                     Left  Hip flexion                              5                          5  Knee flexion                           5                          5  Knee extension                      5                          5  Plantarflexion                         5                          5  Dorsiflexion                            4-                          5    Sensory  Light touch is normal in upper and lower extremities.     Reflexes                                            Right                      Left  Patellar                                2+                         2+    Gait  Casual gait is normal including stance, stride, and arm swing.

## 2024-12-10 NOTE — TELEPHONE ENCOUNTER
I called physiatry [phone] office regarding EMG, I spoke to Kimberly, I explained that provider was requesting actual impression of EMG, Kimberly informed there was another result done on the same day and she would fax over.

## 2024-12-17 ENCOUNTER — OFFICE VISIT (OUTPATIENT)
Dept: PAIN MEDICINE | Facility: CLINIC | Age: 39
End: 2024-12-17
Payer: OTHER MISCELLANEOUS

## 2024-12-17 VITALS
BODY MASS INDEX: 33.37 KG/M2 | DIASTOLIC BLOOD PRESSURE: 102 MMHG | HEIGHT: 69 IN | HEART RATE: 94 BPM | SYSTOLIC BLOOD PRESSURE: 142 MMHG

## 2024-12-17 DIAGNOSIS — G89.4 CHRONIC PAIN SYNDROME: Primary | ICD-10-CM

## 2024-12-17 DIAGNOSIS — M53.3 PAIN OF BOTH SACROILIAC JOINTS: ICD-10-CM

## 2024-12-17 DIAGNOSIS — M54.16 LUMBAR RADICULOPATHY: ICD-10-CM

## 2024-12-17 PROCEDURE — 99214 OFFICE O/P EST MOD 30 MIN: CPT | Performed by: STUDENT IN AN ORGANIZED HEALTH CARE EDUCATION/TRAINING PROGRAM

## 2024-12-17 NOTE — LETTER
December 18, 2024     Patient: Sandy Greer  YOB: 1985  Date of Visit: 12/17/2024      To Whom it May Concern:    Sandy Greer is under my professional care. Sandy was seen in my office on 12/17/2024. Sandy may return to work with limitations - no sitting/prolonged driving > 20 mins, no standing > 30 mins, no repetitive bending/twisting, allow frequent rest breaks. To be reassessed at neurosurgery eval.      If you have any questions or concerns, please don't hesitate to call.         Sincerely,          Annia Price MD

## 2024-12-17 NOTE — PROGRESS NOTES
"Assessment:  1. Chronic pain syndrome    2. Pain of both sacroiliac joints    3. Lumbar radiculopathy        Portions of the record may have been created with voice recognition software. Occasional wrong word or \"sound a like\" substitutions may have occurred due to the inherent limitations of voice recognition software. Read the chart carefully and recognize, using context, where substitutions have occurred. Contact me with any questions.      Plan:  39 y o f here for a follow up visit with regards to chronic low back pain symptoms. Since last visit, she underwent bilateral sacroiliac joint injections on 11/18/24 and notes about 25% improvement in symptoms with this. Continue to note significant difficulty with prolonged sitting or standing, and other functional tasks. Denies new/progressive weakness, saddle anesthesia, bbi. Since last visit, she has undergone an EMG study which showed evidence for right L4-5, L5-S1 radiculopathy. She was evaluated by neurosurgery at [facility] for a second surgical opinion and was advised to undergo further work up including updated lumbar spine xray/MRI. She continues to be on medication management with gabapentin 300 mg qhs, Robaxin 500 gm prn, Tylenol prn per other provider. She continues home exercise program without significant improvement. She has previously been evaluated by rheumatology and fibromyalgia was deemed to be contributing to pain symptoms. Chronic low back pain symptoms likely multifactorial in the setting of lumbar DDD, sacroiliac joint pain, myofascial pain.      Patient defers repeat epidural injection for further symptom management.    Continue home exercise program.    Note for work provided per patient request. Re-eval at neurosurgery f/u.    Follow up in 2-3 months or as needed.    Plan discussed with patient's worker's comp facilitator after obtaining verbal consent from patient.      My impressions and treatment recommendations were discussed in detail with " the patient who verbalized understanding and had no further questions.  Discharge instructions were provided. I personally saw and examined the patient and I agree with the above discussed plan of care.      Orders Placed This Encounter   Procedures    Ambulatory referral to Physical Therapy     Standing Status:   Future     Expiration Date:   12/17/2025     Referral Priority:   Routine     Referral Type:   Physical Therapy     Referral Reason:   Specialty Services Required     Requested Specialty:   Physical Therapy     Number of Visits Requested:   1     Expiration Date:   12/17/2025     No orders of the defined types were placed in this encounter.      History of Present Illness:  Sandy Greer is a 39 y.o. female who presents for a follow up office visit in regards to Back Pain (Radiation in buttocks when sitting.).       I have personally reviewed and/or updated the patient's past medical history, past surgical history, family history, social history, current medications, allergies, and vital signs today.       Review of Systems   Constitutional:  Negative for chills and fever.   HENT:  Negative for sinus pain.    Eyes:  Negative for pain.   Respiratory:  Negative for wheezing.    Cardiovascular:  Negative for palpitations.   Gastrointestinal:  Negative for abdominal pain.   Endocrine: Negative for polyuria.   Musculoskeletal:  Positive for back pain and gait problem. Negative for neck stiffness.        Difficulty walking  Swelling in lower back    Skin:  Negative for rash.   Allergic/Immunologic: Negative for environmental allergies.   Neurological:  Negative for dizziness and seizures.   Psychiatric/Behavioral:  Negative for confusion and sleep disturbance.    All other systems reviewed and are negative.      Patient Active Problem List   Diagnosis    Lumbar radiculopathy    Pain of both sacroiliac joints       Past Medical History:   Diagnosis Date    Brain aneurysm        Past Surgical History:   Procedure  "Laterality Date    BRAIN SURGERY         Family History   Problem Relation Age of Onset    Aneurysm Mother     Heart attack Mother     Schizophrenia Father     Diabetes Father        Social History     Occupational History    Not on file   Tobacco Use    Smoking status: Never    Smokeless tobacco: Never   Vaping Use    Vaping status: Never Used   Substance and Sexual Activity    Alcohol use: Not Currently     Comment: rare    Drug use: Never    Sexual activity: Not on file       Current Outpatient Medications on File Prior to Visit   Medication Sig    acetaminophen (TYLENOL) 500 mg tablet Take 500 mg by mouth every 6 (six) hours as needed    Butalbital-APAP-Caffeine (Fioricet) -40 MG CAPS Take 1 to 2 tablets every 4 hours as needed, do not take more than 6 tablets in a day.    gabapentin (NEURONTIN) 300 mg capsule Take 300 mg by mouth daily at bedtime    Meloxicam 10 MG CAPS     methocarbamol (ROBAXIN) 500 mg tablet Take 1 tablet (500 mg total) by mouth 3 (three) times a day as needed for muscle spasms    predniSONE 20 mg tablet Take 20 mg by mouth daily (Patient not taking: Reported on 12/19/2023)     No current facility-administered medications on file prior to visit.       No Known Allergies    Physical Exam:    BP (!) 142/102 (BP Location: Right arm, Patient Position: Sitting, Cuff Size: Large)   Pulse 94   Ht 5' 9\" (1.753 m)   BMI 33.37 kg/m²     Constitutional:normal, well developed, well nourished, alert, in no distress and non-toxic and no overt pain behavior.  Eyes:anicteric  HEENT:grossly intact  Neck:supple, symmetric, trachea midline and no masses   Pulmonary:even and unlabored  Cardiovascular:No edema or pitting edema present  Skin:Normal without rashes or lesions and well hydrated  Psychiatric:Mood and affect appropriate  Neurologic:Cranial Nerves II-XII grossly intact  Musculoskeletal: uneven gait      Imaging    XR sacroiliac joints 3+ views  Order: [id number]  Impression    Impression: " Unremarkable three-view bilateral SI joints.          Workstation:[id number]  Narrative    Three-view bilateral sacroiliac joints    Indication: SI joint pain.    3 views of bilateral sacroiliac joints show the sacroiliac joints to appear  normal and symmetric bilaterally with no widening, narrowing, nor subchondral  changes.

## 2024-12-18 ENCOUNTER — TELEPHONE (OUTPATIENT)
Age: 39
End: 2024-12-18

## 2024-12-18 DIAGNOSIS — M54.16 LUMBAR RADICULOPATHY: Primary | ICD-10-CM

## 2024-12-18 NOTE — TELEPHONE ENCOUNTER
PT CALLED WITH CONCERNS ABOUT BEING ABLE TO LAY STILL ENOUGH ON HER BACK FOR UPCOMING MRI LSPINE. PT STATES AT LOV 12/10/2024 KT MEDICATION SUCH AS XANAX WAS DISCUSSED TO EASE WITH MRI COMPLETION, BUT PT STATES IT IS NOT A CONCERN ABOUT ANXIETY OR NERVES BUT RATHER HER BACK PAIN THAT MAKES COMPLETING THE MRI SO DIFFICULT SINCE SHE CANNOT BE FLAT ON HER BACK.    PT STATES THAT AFTER SPEAKING WITH DR FISCHER WITH PAIN MEDICINE SHE ADVISED REACHING BACK OUT TO NSX ABOUT OTHER IMAGING OPTIONS, SPECIFICALLY ABOUT POSSIBLY CHANGING MRI ORDER TO AN MRI WITH SEDATION SO THAT PT IS ABLE TO LAY STILL ENOUGH FOR STUDY TO BE COMPLETED.    PLEASE CB PT WITH IMAGING OPTIONS SO THAT SHE CAN HAVE SOMETHING AVAILABLE FOR HER F/U APPT 2/4/25 DKO.      THANK YOU

## 2024-12-18 NOTE — TELEPHONE ENCOUNTER
Reached out to patient who stated she would like to have her MRI done with sedation due to her pain and laying on her back. This RN placed an order for MRI to be done with conscious sedation and transferred the call to central scheduling.     She's aware to have this done prior to the appt otherwise the appt will need to be rescheduled.     She was appreciative.

## 2024-12-26 DIAGNOSIS — M79.18 MYOFASCIAL PAIN SYNDROME: ICD-10-CM

## 2024-12-26 RX ORDER — METHOCARBAMOL 500 MG/1
500 TABLET, FILM COATED ORAL 3 TIMES DAILY PRN
Qty: 90 TABLET | Refills: 0 | Status: SHIPPED | OUTPATIENT
Start: 2024-12-26

## 2024-12-31 ENCOUNTER — TELEPHONE (OUTPATIENT)
Age: 39
End: 2024-12-31

## 2024-12-31 NOTE — TELEPHONE ENCOUNTER
Reached out to patient and advised she does need an xray prior to her visit. Informed her no bloodwork is needed since the BLAIR is wo contrast.     She was appreciative of the call.

## 2024-12-31 NOTE — TELEPHONE ENCOUNTER
PT CALLED TO RESCHED APPT TO AFTER 3/6/2025 MRI LSPINE WITH SEDATION.    PT REQUESTED CLARIFICATION IF LABWORK OR ADDITIONAL XR WERE NEEDED PRIOR TO APPT 3/13/2025 DKO AND WHEN SUCH TESTS SHOULD BE COMPLETED IN ADVANCE. PT STATED SHE ALREADY HAD XR BUT WAS NOT SURE IF MORE WERE NEEDED.      THANK YOU

## 2025-01-03 ENCOUNTER — TELEPHONE (OUTPATIENT)
Dept: PAIN MEDICINE | Facility: CLINIC | Age: 40
End: 2025-01-03

## 2025-01-03 ENCOUNTER — TELEPHONE (OUTPATIENT)
Age: 40
End: 2025-01-03

## 2025-01-03 NOTE — TELEPHONE ENCOUNTER
Call from  pharmacy rep Mitzi asking why pt is continuing to ordered Robaxin as  their eyes is not to be a long term medication.  I was unable to answer her questions.  She notes she will fax a document to the office fro completion by the physician upon her return 1/13/25

## 2025-01-21 ENCOUNTER — TELEPHONE (OUTPATIENT)
Age: 40
End: 2025-01-21

## 2025-01-21 NOTE — TELEPHONE ENCOUNTER
Caller: Silvia [employer]     Doctor: Albert     Reason for call: Looking to do P2P for medication (Methocarbamol) with Dr Price.    Please assist.     Call back#: [phone]

## 2025-01-22 NOTE — TELEPHONE ENCOUNTER
S/W Dr Denny  Tomorrow between 12-1 is fine  If it is easier, please call her on her cell   CB Number below

## 2025-01-22 NOTE — TELEPHONE ENCOUNTER
LM for Silvia to call back to advise Dr Price would be available between 12 and 1pm today .  Needs to speak to nurse

## 2025-01-22 NOTE — TELEPHONE ENCOUNTER
Caller: Dr. Silvia Denny ( [facility])    Doctor: Albert     Reason for call: Dr. Denny returning call for the status of the peer to peer. I advised per chart notes Dr. Price was available today between the hours of 12pm to 1pm. Dr. Denny would like a call back with the next available day and time Dr. Price would be available. Please advise     Call back#: [phone]

## 2025-01-29 DIAGNOSIS — M79.18 MYOFASCIAL PAIN SYNDROME: ICD-10-CM

## 2025-01-30 RX ORDER — METHOCARBAMOL 500 MG/1
500 TABLET, FILM COATED ORAL 3 TIMES DAILY PRN
Qty: 90 TABLET | Refills: 0 | Status: SHIPPED | OUTPATIENT
Start: 2025-01-30

## 2025-02-06 ENCOUNTER — TELEPHONE (OUTPATIENT)
Age: 40
End: 2025-02-06

## 2025-02-06 NOTE — TELEPHONE ENCOUNTER
Caller: Pt    Doctor: Dr. Price    Reason for call: Pt states she has MRI and f/up with Dr Torres in March. Pt  unsure if she needs to be seen on Feb 18 or not at this point or be seen after seeing Melissa     Please assist.     Call back#: [phone]

## 2025-02-21 RX ORDER — RIBOFLAVIN (VITAMIN B2) 100 MG
400 TABLET ORAL DAILY
COMMUNITY
Start: 2024-07-17 | End: 2025-07-12

## 2025-02-21 RX ORDER — POTASSIUM CHLORIDE 1500 MG/1
TABLET, EXTENDED RELEASE ORAL
COMMUNITY
Start: 2025-02-06

## 2025-02-21 RX ORDER — TOPIRAMATE 100 MG/1
100 TABLET, FILM COATED ORAL
COMMUNITY
Start: 2024-07-12 | End: 2025-07-12

## 2025-02-21 RX ORDER — ASPIRIN 81 MG/1
81 TABLET, CHEWABLE ORAL DAILY
COMMUNITY

## 2025-02-21 NOTE — PRE-PROCEDURE INSTRUCTIONS
Pre-Surgery Instructions:   Medication Instructions    acetaminophen (TYLENOL) 500 mg tablet Uses PRN- OK to take day of surgery    Butalbital-APAP-Caffeine (Fioricet) -40 MG CAPS Uses PRN- OK to take day of surgery    gabapentin (NEURONTIN) 300 mg capsule Take day of surgery.    Meloxicam 10 MG CAPS Uses PRN- OK to take day of surgery    methocarbamol (ROBAXIN) 500 mg tablet Uses PRN- OK to take day of surgery    potassium chloride (Klor-Con M20) 20 mEq tablet Hold day of surgery.    Riboflavin (Vitamin B-2) 100 MG TABS Hold day of surgery.    topiramate (TOPAMAX) 100 mg tablet Take day of surgery.    aspirin 81 mg chewable tablet Take day of surgery.    Medication instructions for day of procedure reviewed. Please use only a sip of water to take your instructed morning medications (if any).      You will receive a call one business day prior to procedure with an arrival time and hospital directions. If procedure is scheduled on a Monday, the hospital will be calling you on the Friday prior to your procedure. If you have not heard from anyone by 8pm, please call the hospital supervisor through the hospital  at [phone]. ([facility] [phone]).     Please do not eat or drink after 11 pm the night before the MRI. Please take your medications with a sip of water at least 2 hours prior to their arrival time.     Please shower either the night prior to the procedure or the morning of the procedure. Dress in clean, comfortable clothes. All patients will be required to change into a hospital gown. Street clothes are not permitted in the MRI. Magnetic nail polish must be removed prior to the arrival.      Keep any valuables, jewelry, piercings at home.     Please bring your insurance cards, a form of photo ID, physician order, and a list of medications with you. Arrive 15 minutes prior to your given arrival time in order to register. Please bring any prior CT or MRI studies of the same area that were not  performed at a [facility] along.      Arrange for a responsible person to drive patient to and from the hospital on the day of the procedure. Visitor Guidelines discussed.     Call the prescribing physician's office with any new illnesses, exposures, or additional questions prior to procedure.

## 2025-02-25 ENCOUNTER — TELEPHONE (OUTPATIENT)
Age: 40
End: 2025-02-25

## 2025-02-25 NOTE — TELEPHONE ENCOUNTER
Caller: DELILAH Cartagena      Doctor: Dr. IBRAHIM    Reason for call: Would like to know if their were any changes to her methocarbamol meds      Would like her  most receive OV note fAXED     FAX# [phone]  NATHAN Cartagena     Call back#:   [phone]

## 2025-02-25 NOTE — TELEPHONE ENCOUNTER
Left a detailed message for Mitzi at  that no recent OV's at this time and no medication adjustments

## 2025-02-28 NOTE — QUICK NOTE
RvB done with Dr. Abdullahi on 2/28/25 due to unknown brain coils and stent from 2008. Proceed with MRI Lumbar spine at a RAJESH of less than or equal to 1.5W/kg.

## 2025-03-06 ENCOUNTER — ANESTHESIA EVENT (OUTPATIENT)
Dept: RADIOLOGY | Facility: HOSPITAL | Age: 40
End: 2025-03-06
Payer: OTHER MISCELLANEOUS

## 2025-03-06 ENCOUNTER — ANESTHESIA (OUTPATIENT)
Dept: RADIOLOGY | Facility: HOSPITAL | Age: 40
End: 2025-03-06
Payer: OTHER MISCELLANEOUS

## 2025-03-06 ENCOUNTER — HOSPITAL ENCOUNTER (OUTPATIENT)
Dept: RADIOLOGY | Facility: HOSPITAL | Age: 40
Discharge: HOME | End: 2025-03-06
Payer: OTHER MISCELLANEOUS

## 2025-03-06 VITALS
DIASTOLIC BLOOD PRESSURE: 60 MMHG | RESPIRATION RATE: 18 BRPM | TEMPERATURE: 98.2 F | OXYGEN SATURATION: 98 % | BODY MASS INDEX: 33.47 KG/M2 | WEIGHT: 226 LBS | HEART RATE: 68 BPM | HEIGHT: 69 IN | SYSTOLIC BLOOD PRESSURE: 127 MMHG

## 2025-03-06 DIAGNOSIS — M54.16 LUMBAR RADICULOPATHY: ICD-10-CM

## 2025-03-06 LAB
EXT PREGNANCY TEST URINE: NEGATIVE
EXT. CONTROL: NORMAL

## 2025-03-06 PROCEDURE — 81025 URINE PREGNANCY TEST: CPT | Performed by: ANESTHESIOLOGY

## 2025-03-06 PROCEDURE — 72148 MRI LUMBAR SPINE W/O DYE: CPT

## 2025-03-06 RX ORDER — ONDANSETRON 2 MG/ML
INJECTION INTRAMUSCULAR; INTRAVENOUS AS NEEDED
Status: DISCONTINUED | OUTPATIENT
Start: 2025-03-06 | End: 2025-03-06

## 2025-03-06 RX ORDER — LIDOCAINE HYDROCHLORIDE 10 MG/ML
INJECTION, SOLUTION EPIDURAL; INFILTRATION; INTRACAUDAL; PERINEURAL AS NEEDED
Status: DISCONTINUED | OUTPATIENT
Start: 2025-03-06 | End: 2025-03-06

## 2025-03-06 RX ORDER — SODIUM CHLORIDE, SODIUM LACTATE, POTASSIUM CHLORIDE, CALCIUM CHLORIDE 600; 310; 30; 20 MG/100ML; MG/100ML; MG/100ML; MG/100ML
125 INJECTION, SOLUTION INTRAVENOUS CONTINUOUS
Status: DISCONTINUED | OUTPATIENT
Start: 2025-03-06 | End: 2025-03-07 | Stop reason: HOSPADM

## 2025-03-06 RX ORDER — DEXAMETHASONE SODIUM PHOSPHATE 10 MG/ML
INJECTION, SOLUTION INTRAMUSCULAR; INTRAVENOUS AS NEEDED
Status: DISCONTINUED | OUTPATIENT
Start: 2025-03-06 | End: 2025-03-06

## 2025-03-06 RX ORDER — SODIUM CHLORIDE, SODIUM LACTATE, POTASSIUM CHLORIDE, CALCIUM CHLORIDE 600; 310; 30; 20 MG/100ML; MG/100ML; MG/100ML; MG/100ML
INJECTION, SOLUTION INTRAVENOUS CONTINUOUS PRN
Status: DISCONTINUED | OUTPATIENT
Start: 2025-03-06 | End: 2025-03-06

## 2025-03-06 RX ORDER — LIDOCAINE HYDROCHLORIDE 10 MG/ML
0.5 INJECTION, SOLUTION EPIDURAL; INFILTRATION; INTRACAUDAL; PERINEURAL ONCE AS NEEDED
Status: DISCONTINUED | OUTPATIENT
Start: 2025-03-06 | End: 2025-03-07 | Stop reason: HOSPADM

## 2025-03-06 RX ORDER — PROPOFOL 10 MG/ML
INJECTION, EMULSION INTRAVENOUS AS NEEDED
Status: DISCONTINUED | OUTPATIENT
Start: 2025-03-06 | End: 2025-03-06

## 2025-03-06 RX ADMIN — SODIUM CHLORIDE, SODIUM LACTATE, POTASSIUM CHLORIDE, AND CALCIUM CHLORIDE 125 ML/HR: .6; .31; .03; .02 INJECTION, SOLUTION INTRAVENOUS at 10:53

## 2025-03-06 RX ADMIN — PROPOFOL 150 MG: 10 INJECTION, EMULSION INTRAVENOUS at 11:08

## 2025-03-06 RX ADMIN — ONDANSETRON 4 MG: 2 INJECTION INTRAMUSCULAR; INTRAVENOUS at 11:08

## 2025-03-06 RX ADMIN — SODIUM CHLORIDE, SODIUM LACTATE, POTASSIUM CHLORIDE, AND CALCIUM CHLORIDE: .6; .31; .03; .02 INJECTION, SOLUTION INTRAVENOUS at 11:05

## 2025-03-06 RX ADMIN — DEXAMETHASONE SODIUM PHOSPHATE 10 MG: 10 INJECTION, SOLUTION INTRAMUSCULAR; INTRAVENOUS at 11:08

## 2025-03-06 RX ADMIN — LIDOCAINE HYDROCHLORIDE 50 MG: 10 INJECTION, SOLUTION EPIDURAL; INFILTRATION; INTRACAUDAL; PERINEURAL at 11:08

## 2025-03-06 NOTE — ANESTHESIA POSTPROCEDURE EVALUATION
Post-Op Assessment Note    CV Status:  Stable  Pain Score: 0    Pain management: adequate       Mental Status:  Awake   Hydration Status:  Stable   PONV Controlled:  None   Airway Patency:  Patent     Post Op Vitals Reviewed: Yes    No anethesia notable event occurred.    Staff: Anesthesiologist, CRNA           Last Filed PACU Vitals:  Vitals Value Taken Time   Temp     Pulse     BP     Resp     SpO2            REPORTED TO MRI RN, VSS ON MONITORS, SV ON RA, SEE RN NOTE FOR LAST VS.

## 2025-03-06 NOTE — NURSING NOTE
Lumbar spine MRI w/o contrast completed and patient tolerated procedure well. Post-procedure vital signs taken and recorded. Report given to APU nurse Bibiana. Patient placed in for transport to be taken to APU. Patient offers no complaints or verbalizes any issues upon leaving MRI.

## 2025-03-06 NOTE — ANESTHESIA PREPROCEDURE EVALUATION
Procedure:  MRI LUMBAR SPINE WO CONTRAST  Back Pain   Relevant Problems   GI/HEPATIC   (-) Gastroesophageal reflux disease      PULMONARY   (-) Smoking   (-) URI (upper respiratory infection)   Brain aneurysm S/P Repair 2010     Physical Exam    Airway    Mallampati score: II  TM Distance: >3 FB  Neck ROM: full     Dental   No notable dental hx     Cardiovascular      Pulmonary      Other Findings  post-pubertal.      Anesthesia Plan  ASA Score- 2     Anesthesia Type- general with ASA Monitors.         Additional Monitors:     Airway Plan: LMA.           Plan Factors-Exercise tolerance (METS): >4 METS.    Chart reviewed.    Patient summary reviewed.    Patient is not a current smoker.              Induction- intravenous.    Postoperative Plan-         Informed Consent- Anesthetic plan and risks discussed with patient.  I personally reviewed this patient with the CRNA. Discussed and agreed on the Anesthesia Plan with the CRNA..      NPO Status:  No vitals data found for the desired time range.

## 2025-03-06 NOTE — ANESTHESIA POSTPROCEDURE EVALUATION
Post-Op Assessment Note    CV Status:  Stable  Pain Score: 0    Pain management: adequate       Mental Status:  Alert and awake   Hydration Status:  Euvolemic   PONV Controlled:  Controlled   Airway Patency:  Patent     Post Op Vitals Reviewed: Yes    No anethesia notable event occurred.    Staff: Anesthesiologist           Last Filed PACU Vitals:  Vitals Value Taken Time   Temp 98.2 °F (36.8 °C) 03/06/25 1237   Pulse 68 03/06/25 1237   /60 03/06/25 1237   Resp 18 03/06/25 1237   SpO2 98 % 03/06/25 1237

## 2025-03-12 ENCOUNTER — HOSPITAL ENCOUNTER (OUTPATIENT)
Dept: RADIOLOGY | Facility: HOSPITAL | Age: 40
Discharge: HOME | End: 2025-03-12
Payer: OTHER MISCELLANEOUS

## 2025-03-12 ENCOUNTER — TELEPHONE (OUTPATIENT)
Dept: NEUROSURGERY | Facility: CLINIC | Age: 40
End: 2025-03-12

## 2025-03-12 DIAGNOSIS — M79.18 MYOFASCIAL PAIN SYNDROME: ICD-10-CM

## 2025-03-12 DIAGNOSIS — M54.16 LUMBAR RADICULOPATHY: ICD-10-CM

## 2025-03-12 PROCEDURE — 72114 X-RAY EXAM L-S SPINE BENDING: CPT

## 2025-03-12 RX ORDER — METHOCARBAMOL 500 MG/1
500 TABLET, FILM COATED ORAL 3 TIMES DAILY PRN
Qty: 90 TABLET | Refills: 0 | Status: SHIPPED | OUTPATIENT
Start: 2025-03-12

## 2025-03-12 NOTE — TELEPHONE ENCOUNTER
Pts appt tomorrow 3/13/25 is W/C, called Elsa at [facility] and asked if claim is open and billable, had to leave message.

## 2025-03-13 ENCOUNTER — OFFICE VISIT (OUTPATIENT)
Dept: NEUROSURGERY | Facility: CLINIC | Age: 40
End: 2025-03-13
Payer: OTHER MISCELLANEOUS

## 2025-03-13 VITALS
HEIGHT: 69 IN | BODY MASS INDEX: 33.47 KG/M2 | WEIGHT: 226 LBS | SYSTOLIC BLOOD PRESSURE: 122 MMHG | OXYGEN SATURATION: 99 % | TEMPERATURE: 97.9 F | HEART RATE: 69 BPM | DIASTOLIC BLOOD PRESSURE: 90 MMHG

## 2025-03-13 DIAGNOSIS — M51.46: ICD-10-CM

## 2025-03-13 DIAGNOSIS — M47.816 FACET ARTHROPATHY, LUMBAR: Primary | ICD-10-CM

## 2025-03-13 DIAGNOSIS — M51.369 DDD (DEGENERATIVE DISC DISEASE), LUMBAR: ICD-10-CM

## 2025-03-13 PROCEDURE — 99213 OFFICE O/P EST LOW 20 MIN: CPT | Performed by: NEUROLOGICAL SURGERY

## 2025-03-13 NOTE — TELEPHONE ENCOUNTER
Called and spoke with Alexis at [employer] who confirmed the claim was open and active. Alexis also confirmed the claim info.    WORK INJURY     State: [place]     Date of Injury: 10/02/2023     W/C CLAIM #: [id number]      : ALEXIS WILL     #: [phone]     Ins. Name: MIGUEL     Billing Address: [facility] [id number] [place] [address]     Employer Name: [employer]      Employer Address: [address] [place] [id number] (CORPORATE )      Claim Status: OPEN AND BILLABLE     Date verified: 03/13/2025     Verified by: ALEXIS

## 2025-03-13 NOTE — PROGRESS NOTES
Name: Sandy Greer      : 1985      MRN: [id number]  Encounter Provider: Javy Zamudio MD  Encounter Date: 3/13/2025   Encounter department: [facility]  :  Assessment & Plan  Schmorl nodes of lumbar region  This is a 40-year-old female who developed severe low back pain with right leg and nerve root pain caused by a fracture of the endplate at the L4-5 region on the right side.  The MRI clearly shows this with increased Modic change around the periphery of this.  This is been described as a Schmorl's node.  This puts increased pressure on the facet joint which causes delayed healing.  I have recommended the use of a back brace, medial branch blocks on the right at L4-5 followed by an RFA and continued physical therapy.  This will take time to heal but it is unlikely to require surgical intervention.  Surgery would be reserved as a final alternative if conservative measures were ineffective after a year.  Orders:    Ambulatory referral to Spine & Pain Management; Future    Lso Back Brace    Facet arthropathy, lumbar  As above  Orders:    Ambulatory referral to Spine & Pain Management; Future    Lso Back Brace    DDD (degenerative disc disease), lumbar  As above  Orders:    Ambulatory referral to Spine & Pain Management; Future    Lso Back Brace        History of Present Illness     Sandy Greer is a 40 y.o. female who presents as follows    Pain in back radiating to the legs right more than the left  Unsteadiness  Weakness in the right foot  Numbness right leg and foot  No spasms  PT in 2024 with minor relief  EDSI with minor relief  Her injury occurred while moving a 30 gallon trash can filled to the top with water.  This was collecting water from a failed sprinkler system.  She was asked to move this container by lifting it and taking it outside.  This caused acute pain and it has had residual effects since that time.  She complains of numbness and tingling in  the right foot which occasionally is so severe that she is concerned about its effect on controlling the pedals of her car if it were to get worse.  Currently she believes she can control the vehicle.  She takes Tylenol and Motrin for pain which helps minimally.             Review of Systems   Constitutional: Negative.    HENT: Negative.     Eyes: Negative.    Respiratory: Negative.     Cardiovascular: Negative.    Gastrointestinal: Negative.    Endocrine: Negative.    Genitourinary: Negative.    Musculoskeletal:  Positive for back pain and gait problem. Negative for myalgias.        C/o: LBP radiates to buttock R>L, occ unsteady, Weakness - Rt foot, Numbness - Rt Leg/foot, Pain disrupts sleep; no m/s.   Imagin2025 - MRI Lumbar; XR Lumbar 2025  PT: Last PT was 2024 (Lumbar spine instability) - a little relief  PONCE: FL S/P Procedure (SI Joint) 2024 - a little relief  PM: Medication Management - gabapentin 300 mg qhs, Robaxin 500 gm prn, Tylenol prn. (Bookmarked - 2024)  EM2024; 10/14/2024 (Reports Only - Printed/Bookmarked)  SX: N/A  AntiCoag: No AC  Smoker: Nonsmoker     Skin: Negative.    Allergic/Immunologic: Negative.    Neurological:  Positive for weakness and numbness.   Hematological: Negative.    Psychiatric/Behavioral:  Positive for sleep disturbance.      I have personally reviewed the MA's review of systems and made changes as necessary.    Past Medical History   Past Medical History:   Diagnosis Date    Brain aneurysm      Past Surgical History:   Procedure Laterality Date    BRAIN SURGERY      coils, MRI compat.     Family History   Problem Relation Age of Onset    Aneurysm Mother     Heart attack Mother     Schizophrenia Father     Diabetes Father      she reports that she has never smoked. She has never used smokeless tobacco. She reports that she does not currently use alcohol. She reports that she does not use drugs.  Current Outpatient Medications  "  Medication Instructions    acetaminophen (TYLENOL) 500 mg, Every 6 hours PRN    aspirin 81 mg, Daily    Butalbital-APAP-Caffeine (Fioricet) -40 MG CAPS Take 1 to 2 tablets every 4 hours as needed, do not take more than 6 tablets in a day.    gabapentin (NEURONTIN) 300 mg, Daily at bedtime    Meloxicam 10 MG CAPS     methocarbamol (ROBAXIN) 500 mg, Oral, 3 times daily PRN    potassium chloride (Klor-Con M20) 20 mEq tablet take 1 tablet by mouth once daily for 15 DAYS    topiramate (TOPAMAX) 100 mg    Vitamin B-2 400 mg, Daily   No Known Allergies   Objective   /90 (BP Location: Right arm, Patient Position: Sitting, Cuff Size: Adult)   Pulse 69   Temp 97.9 °F (36.6 °C) (Temporal)   Ht 5' 9\" (1.753 m)   Wt 103 kg (226 lb)   LMP 02/21/2025 (Approximate)   SpO2 99%   BMI 33.37 kg/m²     Physical Exam  Vitals and nursing note reviewed.   Constitutional:       General: She is not in acute distress.     Appearance: Normal appearance. She is not ill-appearing, toxic-appearing or diaphoretic.   HENT:      Head: Normocephalic.      Nose: Nose normal.   Eyes:      Extraocular Movements: Extraocular movements intact.      Pupils: Pupils are equal, round, and reactive to light.   Musculoskeletal:         General: No swelling, tenderness, deformity or signs of injury.      Cervical back: Normal range of motion and neck supple. No rigidity.      Right lower leg: No edema.      Left lower leg: No edema.      Comments: Range of motion at the LS spine is diminished and associated with pain.  Transitions from sitting to standing are slow and associated with a facial grimace compatible with pain.  She has great difficulty doing lunges with the right being worse than the left.  And the step up test she has great difficulty with the right.  In traversing steps she does with 1 foot at a time.   Lymphadenopathy:      Cervical: No cervical adenopathy.   Skin:     General: Skin is warm and dry.      Coloration: Skin is " not jaundiced.      Findings: No erythema or rash.   Neurological:      Mental Status: She is alert and oriented to person, place, and time.      Cranial Nerves: No cranial nerve deficit.      Sensory: Sensory deficit (Reduced sensation by about 50% in the L5 distribution in the right leg) present.      Motor: No weakness.      Coordination: Coordination normal.      Gait: Gait abnormal (Cannot perform tandem gait without holding onto the wall.).      Deep Tendon Reflexes: Reflexes abnormal (Absent DTR at the Achilles on the right).      Comments: Her gait is slow and antalgic.  If she limps with the right leg favoring the left   Psychiatric:         Mood and Affect: Mood normal.         Behavior: Behavior normal.         Thought Content: Thought content normal.         Judgment: Judgment normal.       Neurological Exam  Mental Status  Alert. Oriented to person, place, and time.    Cranial Nerves  CN III, IV, VI: Extraocular movements intact bilaterally. Pupils equal round and reactive to light bilaterally.    Gait   Abnormal gait (Cannot perform tandem gait without holding onto the wall.).  Her gait is slow and antalgic.  If she limps with the right leg favoring the left.      Radiology Results Review: I personally reviewed the following image studies in PACS and associated radiology reports: MRI spine. My interpretation of the radiology images/reports is: MRI of the LS spine is carefully reviewed.  This demonstrates some facet arthropathy on the right greater than the left but a Schmorl's node development on the right side at the L4-5 region.  There is associated Modic change with this and a disc bulge.  There is no compressive element of the exiting or traversing nerve root.  There are small central disc bulges..

## 2025-03-25 ENCOUNTER — OFFICE VISIT (OUTPATIENT)
Dept: PAIN MEDICINE | Facility: CLINIC | Age: 40
End: 2025-03-25
Payer: OTHER MISCELLANEOUS

## 2025-03-25 VITALS — WEIGHT: 227 LBS | HEIGHT: 69 IN | BODY MASS INDEX: 33.62 KG/M2

## 2025-03-25 DIAGNOSIS — M47.816 FACET ARTHROPATHY, LUMBAR: Primary | ICD-10-CM

## 2025-03-25 DIAGNOSIS — M54.41 CHRONIC BILATERAL LOW BACK PAIN WITH RIGHT-SIDED SCIATICA: ICD-10-CM

## 2025-03-25 DIAGNOSIS — G89.29 CHRONIC BILATERAL LOW BACK PAIN WITH RIGHT-SIDED SCIATICA: ICD-10-CM

## 2025-03-25 DIAGNOSIS — G89.4 CHRONIC PAIN SYNDROME: ICD-10-CM

## 2025-03-25 DIAGNOSIS — M51.369 DDD (DEGENERATIVE DISC DISEASE), LUMBAR: ICD-10-CM

## 2025-03-25 PROCEDURE — 99214 OFFICE O/P EST MOD 30 MIN: CPT | Performed by: STUDENT IN AN ORGANIZED HEALTH CARE EDUCATION/TRAINING PROGRAM

## 2025-03-25 NOTE — PROGRESS NOTES
"Assessment:  1. Facet arthropathy, lumbar    2. Chronic bilateral low back pain with right-sided sciatica    3. DDD (degenerative disc disease), lumbar    4. Chronic pain syndrome        Portions of the record may have been created with voice recognition software. Occasional wrong word or \"sound a like\" substitutions may have occurred due to the inherent limitations of voice recognition software. Read the chart carefully and recognize, using context, where substitutions have occurred. Contact me with any questions.      Plan:  40-year-old female here for follow-up visit with regards to chronic low back pain symptoms.  She reports right greater than left low back pain.  She also notes intermittent radicular pain in RLE with right foot numbness.  Notes stable symptoms, denies new or progressive weakness, saddle anesthesia, BBI.  Notes axial right-sided low back pain symptoms are her most significant concern.  She was recently evaluated by neurosurgery and no surgical intervention was recommended.  Chronic low back pain symptoms likely multifactorial in the setting of lumbar DDD, facet arthropathy, sacroiliac joint pain, myofascial pain.      Discussed option of lumbar medial branch block, RFA for symptom management.  Patient elected to schedule right lumbar medial branch block L3, L4, L5 #1 today.  Risks and benefits discussed.  Educational material provided for review.    Follow-up 1 week after lumbar MBB #1.        Complete risks and benefits including bleeding, infection, tissue reaction, nerve injury and allergic reaction were discussed. The approach was demonstrated using models and literature was provided. Verbal and written consent was obtained.    My impressions and treatment recommendations were discussed in detail with the patient who verbalized understanding and had no further questions.  Discharge instructions were provided. I personally saw and examined the patient and I agree with the above discussed " plan of care.    Orders Placed This Encounter   Procedures    FL spine and pain procedure     Standing Status:   Future     Expected Date:   3/25/2025     Expiration Date:   3/25/2029     Reason for Exam::   right L3, L4, L5 medial branch block # 1     Is the patient pregnant?:   No     Anticoagulant hold needed?:   no     No orders of the defined types were placed in this encounter.      History of Present Illness:  Sandy Greer is a 40 y.o. female who presents for a follow up office visit in regards to Back Pain, Buttocks Pain, Foot Pain (Right numbness), and Headache.      I have personally reviewed and/or updated the patient's past medical history, past surgical history, family history, social history, current medications, allergies, and vital signs today.     Review of Systems   Constitutional:  Negative for fever.   HENT:  Negative for sinus pain.    Eyes:  Negative for redness.   Respiratory:  Negative for shortness of breath.    Cardiovascular:  Negative for palpitations.   Gastrointestinal:  Negative for abdominal pain and nausea.   Endocrine: Negative for polydipsia.   Genitourinary:  Negative for difficulty urinating.   Musculoskeletal:  Negative for gait problem and myalgias.        Decreased ROM   Skin:  Negative for rash.   Neurological:  Positive for dizziness. Negative for seizures and headaches.        Memory loss   Psychiatric/Behavioral:  Negative for decreased concentration. The patient is not nervous/anxious.        Patient Active Problem List   Diagnosis    Lumbar radiculopathy    Pain of both sacroiliac joints    DDD (degenerative disc disease), lumbar    Facet arthropathy, lumbar    Schmorl nodes of lumbar region       Past Medical History:   Diagnosis Date    Brain aneurysm        Past Surgical History:   Procedure Laterality Date    BRAIN SURGERY      coils, MRI compat.       Family History   Problem Relation Age of Onset    Aneurysm Mother     Heart attack Mother     Schizophrenia Father   "   Diabetes Father        Social History     Occupational History    Not on file   Tobacco Use    Smoking status: Never    Smokeless tobacco: Never   Vaping Use    Vaping status: Never Used   Substance and Sexual Activity    Alcohol use: Not Currently     Comment: rare    Drug use: Never    Sexual activity: Not on file       Current Outpatient Medications on File Prior to Visit   Medication Sig    acetaminophen (TYLENOL) 500 mg tablet Take 500 mg by mouth every 6 (six) hours as needed    aspirin 81 mg chewable tablet Chew 81 mg daily    Butalbital-APAP-Caffeine (Fioricet) -40 MG CAPS Take 1 to 2 tablets every 4 hours as needed, do not take more than 6 tablets in a day.    gabapentin (NEURONTIN) 300 mg capsule Take 300 mg by mouth daily at bedtime    Meloxicam 10 MG CAPS     methocarbamol (ROBAXIN) 500 mg tablet take 1 tablet by mouth three times a day if needed for muscle spasm    potassium chloride (Klor-Con M20) 20 mEq tablet take 1 tablet by mouth once daily for 15 DAYS    Riboflavin (Vitamin B-2) 100 MG TABS Take 400 mg by mouth daily    topiramate (TOPAMAX) 100 mg tablet Take 100 mg by mouth     No current facility-administered medications on file prior to visit.       No Known Allergies    Physical Exam:    Ht 5' 9\" (1.753 m)   Wt 103 kg (227 lb)   LMP 02/21/2025 (Approximate)   BMI 33.52 kg/m²     Constitutional:normal, well developed, well nourished, alert, in no distress and non-toxic and no overt pain behavior.  Eyes:anicteric  HEENT:grossly intact  Neck:supple, symmetric, trachea midline and no masses   Pulmonary:even and unlabored  Cardiovascular:No edema or pitting edema present  Skin:Normal without rashes or lesions and well hydrated  Psychiatric:Mood and affect appropriate  Neurologic:Cranial Nerves II-XII grossly intact  Musculoskeletal: uneven gait, pain to palpation over R lumbar paraspinals, limited lumbar ROM    Imaging      Narrative & Impression   MRI LUMBAR SPINE WITHOUT CONTRAST   "   INDICATION: M54.16: Radiculopathy, lumbar region.     COMPARISON: MRI lumbar spine 6/7/2024     TECHNIQUE:  Multiplanar, multisequence imaging of the lumbar spine was performed. .        IMAGE QUALITY:  Diagnostic     FINDINGS:     VERTEBRAL BODIES:  There are 5 lumbar type vertebral bodies. Alignment is unremarkable. Activa/acute Schmorl's nodes in the L4 inferior endplate with mild edema. Schmorl's nodes in the L5 superior endplate with Modic type II marrow signal change.     SACRUM:  Normal signal within the sacrum. No evidence of insufficiency or stress fracture.     DISTAL CORD AND CONUS:  Normal size and signal within the distal cord and conus.     PARASPINAL SOFT TISSUES:  Paraspinal soft tissues are unremarkable.     LOWER THORACIC DISC SPACES:  Normal disc height and signal.  No disc herniation, canal stenosis or foraminal narrowing.     LUMBAR DISC SPACES:     L1-L2: No disc bulge. No canal or foraminal stenosis.     L2-L3: No disc bulge. No canal or foraminal stenosis.     L3-L4: Degenerative disc dehydration. Minimal disc bulge with central annular fissure flattening the ventral thecal sac without significant canal stenosis. No foraminal stenosis.     L4-L5: Degenerative disc dehydration. Disc bulge and superimposed central disc protrusion mildly indenting the ventral thecal sac. No significant foraminal stenosis.     L5-S1: Degenerative disc dehydration. Minor disc bulge without canal or foraminal stenosis.     OTHER FINDINGS:  None.     IMPRESSION:     Mild degenerative change pronounced at levels L3-4 and L4-5 as described without significant canal or foraminal stenosis.     Narrative & Impression   XR SPINE LUMBAR COMPLETE W BENDING MINIMUM 6 VIEWS     INDICATION: M54.16: Radiculopathy, lumbar region.     COMPARISON: None     FINDINGS:     No acute fracture. Intact pedicles.     Five non-rib-bearing lumbar vertebral bodies.     Normal alignment.     There is disc space narrowing at L5-S1.      Calcifications project over the right upper quadrant.     IMPRESSION:     No acute osseous abnormality.

## 2025-03-26 ENCOUNTER — TELEPHONE (OUTPATIENT)
Dept: PAIN MEDICINE | Facility: CLINIC | Age: 40
End: 2025-03-26

## 2025-03-28 ENCOUNTER — EVALUATION (OUTPATIENT)
Dept: PHYSICAL THERAPY | Age: 40
End: 2025-03-28
Payer: OTHER MISCELLANEOUS

## 2025-03-28 ENCOUNTER — TELEPHONE (OUTPATIENT)
Dept: PAIN MEDICINE | Facility: CLINIC | Age: 40
End: 2025-03-28

## 2025-03-28 DIAGNOSIS — M54.16 LUMBAR RADICULOPATHY: ICD-10-CM

## 2025-03-28 DIAGNOSIS — G89.4 CHRONIC PAIN SYNDROME: ICD-10-CM

## 2025-03-28 DIAGNOSIS — M53.3 PAIN OF BOTH SACROILIAC JOINTS: ICD-10-CM

## 2025-03-28 PROCEDURE — 97162 PT EVAL MOD COMPLEX 30 MIN: CPT | Performed by: PHYSICAL THERAPIST

## 2025-03-28 PROCEDURE — 97113 AQUATIC THERAPY/EXERCISES: CPT | Performed by: PHYSICAL THERAPIST

## 2025-03-28 NOTE — PROGRESS NOTES
PT Evaluation     Today's date: 3/28/2025  Patient name: Sandy Greer  : 1985  MRN: [id number]  Referring provider: Annia Price MD  Dx:   Encounter Diagnosis     ICD-10-CM    1. Pain of both sacroiliac joints  M53.3 Ambulatory referral to Physical Therapy      2. Chronic pain syndrome  G89.4 Ambulatory referral to Physical Therapy      3. Lumbar radiculopathy  M54.16 Ambulatory referral to Physical Therapy          Start Time: 830  Stop Time: 930  Total time in clinic (min): 60 minutes    Assessment  Impairments: abnormal gait, abnormal or restricted ROM, activity intolerance, impaired physical strength, lacks appropriate home exercise program, pain with function, poor posture , poor body mechanics, participation limitations and activity limitations  Functional limitations: sitting, walking, stadning, wrok duties, caring for household and children  Symptom irritability: high    Assessment details: Problem list:  1. Central sensitization/chronic pain syndrome  2. Poor lumbar mobility with global loss of motion  3. Generalized weakness with right LE weakness    Sandy Greer is a pleasant 41 yo female who presents with chronic low back pain radiating into right buttocks and right leg > left after sustaining a work related injury in . She has persistent pain without relief from prior conservative means including land and aquatic PT and injections. She has high pain levels that are produced with movement. She has global loss of lumbar mobility with pain produced in all directions with greatest loss of lumbar extension. She is hyperlordotic but has difficulty maintaining an erect standing posture. She describes sensory changes with loss of sensation to light touch in great toe but otherwise non-specific dermatomal pattern. Reflexes are maintained. She has inhibition weakness in her LE due to pain reproduction and difficult to assess due to high symptom irritability. Standing posture is poor.  Transitional movements including sit to stand and bed mobility are slow and guarded.   At this time, Sandy will benefit from skilled PT services including aquatic therapy with graded exercise to promote movement, general conditioning, and postural control to decrease pain and improve overall function.    Barriers to therapy: Central sensitization  Understanding of Dx/Px/POC: fair     Prognosis: fair  Prognosis details: Negative prognostic indicators:  Failed response to prior conservative treatments, central sensitization    Goals  STG 4 weeks:  1. Pt will tolerate up to 10min of walking in an aquatic environment.   2. Pt will be able to tolerate up to 45 min of low intensity movement in aquatic environment.  LTG 10 weeks:  1. Pt will be able to perform an independent fitness program by d/c.  2. Pt will demonstrate upright posture for standing and walking.    Plan    Planned therapy interventions: aquatic therapy, postural training, neuromuscular re-education, home exercise program, graded exercise, functional ROM exercises, therapeutic activities, therapeutic exercise and strengthening    Frequency: 2x week  Plan of Care beginning date: 3/28/2025  Plan of Care expiration date: 6/26/2025  Treatment plan discussed with: patient  Plan details: 16-20 visits      Subjective Evaluation    History of Present Illness  Date of onset: 10/2/2023  Mechanism of injury: Pt sustained a work related injury 10/2/23. Pt noted at work the fire sprinkler malfunctioned at work and she tried to lift a trash can full of water to empty it. She felt pain in her back and tried to stretch her back. She was unable to get get back up for the floor She was unable to drive home.     She has had injections and initially had no relief. The second injection did make the pain in her buttocks better but she is still unable to sit for long periods. She does use a home TENS and gets a little relief with use.   She is going to try RFA.     She has pain  with sitting for long periods, she has difficulty sleeping and is unable to get comfortable. She sleeps at times in her recliner.  She has pain with bending, difficulty putting on shoes and socks.     She has had prior PT both land based and prior aquatic based PT.    Pt's goals with PT are to be able feel better and be able to move.   Patient Goals  Patient goals for therapy: decreased pain, increased motion and return to sport/leisure activities    Pain  Current pain ratin  At worst pain rating: 10  Location: low back radiates to right buttock > left  Quality: sharp and radiating  Relieving factors: medications (OTC, TENS, recliner)  Aggravating factors: sitting, standing and walking    Social Support  Lives with: spouse and young children          Objective     Concurrent Complaints      Additional Special Questions  No red flags    Neurological Testing     Sensation     Lumbar     Right   Diminished: light touch  Paresthesia: light touch    Comments   Right light touch: right foot paresthesias    Reflexes   Left   Patellar (L4): normal (2+)  Achilles (S1): normal (2+)    Right   Patellar (L4): normal (2+)  Achilles (S1): normal (2+)    Additional Neurological Details  Diminished sensation on great toe    Active Range of Motion     Lumbar   Flexion:  with pain Restriction level: moderate  Extension:  with pain Restriction level: maximal  Left lateral flexion:  with pain Restriction level: maximal  Right lateral flexion:  with pain Restriction level: maximal  Left rotation:  with pain Restriction level: maximal  Right rotation:  with pain Restriction level: maximal    Strength/Myotome Testing     Left Hip   Planes of Motion   Flexion: 3    Right Hip   Planes of Motion   Flexion: 3-    Left Knee   Flexion: 4-  Extension: 4-    Right Knee   Flexion: 3+  Extension: 4-    Left Ankle/Foot   Dorsiflexion: 5  Plantar flexion: 5  Great toe extension: 5    Right Ankle/Foot   Dorsiflexion: 4-  Plantar flexion: 4-  Great  toe extension: 4           WC open and payable  POC expires Unit limit Auth Expiration date PT/OT + Visit Limit?                                 Visit/Unit Tracking  AUTH Status:  Date 3/28              No Auth Used 1               Remaining                 FOTO                   Precautions: standard    Daily Treatment Diary     Date 3/28          Patient education 5          Water Walk (FW, BW, side) x10’  5'          LE work at wall               Hip FF/ext swing              Toe/heel              Hip ABD/ADD              March             Knee flexion & extension             Squats           UE noodle x3             Push/pull              Rotate              Row forward & back              OH side bend            UE/Core (AROM, paddles, MB)              ABD/ADD              Horizontal Pec Fly              Alt. arm swing (shld flex/ext)              Push pull              Single paddle rotation           SLS (EO, EC, ball toss)            Aqua Step (FW, lat, eccentric)            Core work:              MF press (red, blue, blk)             Kickboard press (blue, red)              Row/ext w/ tubing (red, blue, blk)           Seated on bench             ankle DF/PF              March              ABD/ADD              Knee flexion/extension            DW TX - hang in the deep water  10'/ 5'            DW ABD/ADD  2            DW Bicycle  3            DW Scissor hip flexion/extension              DW ankle DF/PF 2          Stretches              HS at step              Wall stretches              Calf stretch at wall              Other:            Hot Tub - 10 minutes only

## 2025-04-01 ENCOUNTER — OFFICE VISIT (OUTPATIENT)
Dept: PHYSICAL THERAPY | Age: 40
End: 2025-04-01
Payer: OTHER MISCELLANEOUS

## 2025-04-01 DIAGNOSIS — G89.4 CHRONIC PAIN SYNDROME: ICD-10-CM

## 2025-04-01 DIAGNOSIS — M54.16 LUMBAR RADICULOPATHY: ICD-10-CM

## 2025-04-01 DIAGNOSIS — M53.3 PAIN OF BOTH SACROILIAC JOINTS: Primary | ICD-10-CM

## 2025-04-01 PROCEDURE — 97113 AQUATIC THERAPY/EXERCISES: CPT | Performed by: PHYSICAL THERAPIST

## 2025-04-01 NOTE — PROGRESS NOTES
Daily Note     Today's date: 2025  Patient name: Sandy Greer  : 1985  MRN: [id number]  Referring provider: Annia Price MD  Dx:   Encounter Diagnosis     ICD-10-CM    1. Pain of both sacroiliac joints  M53.3       2. Chronic pain syndrome  G89.4       3. Lumbar radiculopathy  M54.16           Start Time: 0930  Stop Time: 1000  Total time in clinic (min): 30 minutes    Subjective: patient complains of left low back pain at 7/10 today      Objective: See treatment diary below      Assessment: Tolerated treatment poor. Patient demonstrated fatigue post treatment, exhibited good technique with therapeutic exercises, and would benefit from continued PT, difficulty progressing due to pain levels      Plan: Progress treatment as tolerated.       WC open and payable  POC expires Unit limit Auth Expiration date PT/OT + Visit Limit?                                 Visit/Unit Tracking  AUTH Status:  Date 3/28              No Auth Used 1               Remaining                 FOTO                   Precautions: standard    Daily Treatment Diary     Date 3/28 4/1         Patient education 5          Water Walk (FW, BW, side) x10’  5' 5 min         LE work at wall               Hip FF/ext swing              Toe/heel   1           Hip ABD/ADD              Knee flexion & extension             Squats  1         UE noodle x3             Push/pull   1           Rotate   1           Row forward & back              OH side bend            UE/Core (AROM, paddles, MB)              ABD/ADD              Horizontal Pec Fly              Alt. arm swing (shld flex/ext)              Push pull              Single paddle rotation           SLS (EO, EC, ball toss)            Aqua Step (FW, lat, eccentric)            Core work:              MF press (red, blue, blk)             Kickboard press (blue, red)              Row/ext w/ tubing (red, blue, blk)           Seated on bench             ankle DF/PF               March              ABD/ADD              Knee flexion/extension            DW TX - hang in the deep water  10'/ 5' 10           DW ABD/ADD  2 2           DW Bicycle  3 5           DW Scissor hip flexion/extension              DW ankle DF/PF 2 2         Stretches              HS at step   2           Wall stretches              Calf stretch at wall              Other:            Hot Tub - 10 minutes only   10

## 2025-04-04 ENCOUNTER — OFFICE VISIT (OUTPATIENT)
Dept: PHYSICAL THERAPY | Age: 40
End: 2025-04-04
Payer: OTHER MISCELLANEOUS

## 2025-04-04 DIAGNOSIS — M53.3 PAIN OF BOTH SACROILIAC JOINTS: Primary | ICD-10-CM

## 2025-04-04 DIAGNOSIS — G89.4 CHRONIC PAIN SYNDROME: ICD-10-CM

## 2025-04-04 DIAGNOSIS — M54.16 LUMBAR RADICULOPATHY: ICD-10-CM

## 2025-04-04 PROCEDURE — 97113 AQUATIC THERAPY/EXERCISES: CPT

## 2025-04-04 NOTE — PROGRESS NOTES
Daily Note     Today's date: 2025  Patient name: Sandy Greer  : 1985  MRN: [id number]  Referring provider: Annia Price MD  Dx:   Encounter Diagnosis     ICD-10-CM    1. Pain of both sacroiliac joints  M53.3       2. Chronic pain syndrome  G89.4       3. Lumbar radiculopathy  M54.16           Start Time: 0800  Stop Time: 0900  Total time in clinic (min): 60 minutes    Subjective: pt notes no change w/ pain and symptoms. She c/o increased pain w/ walking and standing.       Objective: See treatment diary below  Pt seen 1;1 for 30 minutes by myself and 24 min by Franoc adkins PTA.    Assessment: Tolerated treatment fair. Cueing needed throughout session for corrective posture w/ gait/WW and all ex's. Pt has poor tolerance for corrective posture. Pt struggles w/ simple ex's seated and standing, especially in shallower water. Pt needs encouragement to continue w/ ex's as much as she can at home and movement is better that being stationary. Patient would benefit from continued PT      Plan: Continue per plan of care.      WC open and payable  POC expires Unit limit Auth Expiration date PT/OT + Visit Limit?                                 Visit/Unit Tracking  AUTH Status:  Date 3/28/ 4/1 4/4            No Auth Used 1 2 3             Remaining                 FOTO                   Precautions: standard    Daily Treatment Diary     Date 3/28 4/1 4/4        Patient education 5          Water Walk (FW, BW, side) x10’  5' 5 min 10        LE work at wall               Hip FF/ext swing    2          Toe/heel   1 1          Hip ABD/ADD   1  1          Knee flexion & extension   1          Squats  1 1        UE noodle x3             Push/pull   1 1          Rotate   1 1          Row forward & back              OH side bend            UE/Core (AROM, paddles, MB)              ABD/ADD              Horizontal Pec Fly              Alt. arm swing (shld flex/ext)              Push pull               Single paddle rotation           SLS (EO, EC, ball toss)            Aqua Step (FW, lat, eccentric)            Core work:              MF press (red, blue, blk)             Kickboard press (blue, red)              Row/ext w/ tubing (red, blue, blk)           Seated on bench             ankle DF/PF    1 March 1          ABD/ADD              Knee flexion/extension    1        DW TX - hang in the deep water  10'/ 5' 10 10          DW ABD/ADD  2 2           DW Bicycle  3 5 6          DW Scissor hip flexion/extension              DW ankle DF/PF 2 2         Stretches              HS at step   2 2          Wall stretches              Calf stretch at wall              Other:            Hot Tub - 10 minutes only   10 10

## 2025-04-08 ENCOUNTER — APPOINTMENT (OUTPATIENT)
Dept: PHYSICAL THERAPY | Age: 40
End: 2025-04-08
Payer: OTHER MISCELLANEOUS

## 2025-04-11 ENCOUNTER — OFFICE VISIT (OUTPATIENT)
Dept: PHYSICAL THERAPY | Age: 40
End: 2025-04-11
Payer: OTHER MISCELLANEOUS

## 2025-04-11 DIAGNOSIS — G89.4 CHRONIC PAIN SYNDROME: ICD-10-CM

## 2025-04-11 DIAGNOSIS — M53.3 PAIN OF BOTH SACROILIAC JOINTS: Primary | ICD-10-CM

## 2025-04-11 DIAGNOSIS — M54.16 LUMBAR RADICULOPATHY: ICD-10-CM

## 2025-04-11 PROCEDURE — 97150 GROUP THERAPEUTIC PROCEDURES: CPT

## 2025-04-11 PROCEDURE — 97113 AQUATIC THERAPY/EXERCISES: CPT

## 2025-04-11 NOTE — PROGRESS NOTES
Daily Note     Today's date: 2025  Patient name: Sandy Greer  : 1985  MRN: [id number]  Referring provider: Annia Price MD  Dx:   Encounter Diagnosis     ICD-10-CM    1. Pain of both sacroiliac joints  M53.3       2. Chronic pain syndrome  G89.4       3. Lumbar radiculopathy  M54.16                      Subjective: Patient reports that she is hurting today, she c/o 8-9/10 LBP due to the weather.       Objective: See treatment diary below      Assessment: Tolerated treatment fairly well with program outlined below, cues for proper ex technique and to correct her flexed posture. Patient is tolerating program better. Patient demonstrated fatigue post treatment and would benefit from continued PT    Patient seen 1:1 for 30 minutes and rest of time group setting.        Plan: Continue per plan of care.  Progress treatment as tolerated.       WC open and payable  POC expires Unit limit Auth Expiration date PT/OT + Visit Limit?                                 Visit/Unit Tracking  AUTH Status:  Date 3/28/ 4/1 4/4 4/11           No Auth Used 1 2 3 4            Remaining                 FOTO                   Precautions: standard    Daily Treatment Diary     Date 3/28 4/1 4/4 4/11       Patient education 5          Water Walk (FW, BW, side) x10’  5' 5 min 10 5' FB w/ N       LE work at wall               Hip FF/ext swing    2 2         Toe/heel   1 1 1         Hip ABD/ADD   1 2 2          1 1         Knee flexion & extension   1 1         Squats  1 1 1       UE noodle x3             Push/pull   1 1 1         Rotate   1 1 1         Row forward & back              OH side bend            UE/Core (AROM, paddles, MB)              ABD/ADD              Horizontal Pec Fly              Alt. arm swing (shld flex/ext)              Push pull              Single paddle rotation           SLS (EO, EC, ball toss)            Aqua Step (FW, lat, eccentric)            Core work:              MF press (red, blue,  "blk)             Kickboard press (blue, red)              Row/ext w/ tubing (red, blue, blk)           Seated on bench             ankle DF/PF    1 1 March 1 1         ABD/ADD              Knee flexion/extension    1 1       DW TX - hang in the deep water  10'/ 5' 10 10 10/5'         DW ABD/ADD  2 2  2         DW Bicycle  3 5 6 6         DW Scissor hip flexion/extension              DW ankle DF/PF 2 2  2       Stretches              HS at step   2 2 15\"x3 ea  On bench         Wall stretches              piriformis     10\"x3 ea         Nerve glides    10x ea       Hot Tub - 10 minutes only   10 10 10'                     "

## 2025-04-15 ENCOUNTER — OFFICE VISIT (OUTPATIENT)
Dept: PHYSICAL THERAPY | Age: 40
End: 2025-04-15
Payer: OTHER MISCELLANEOUS

## 2025-04-15 DIAGNOSIS — M54.16 LUMBAR RADICULOPATHY: ICD-10-CM

## 2025-04-15 DIAGNOSIS — G89.4 CHRONIC PAIN SYNDROME: ICD-10-CM

## 2025-04-15 DIAGNOSIS — M53.3 PAIN OF BOTH SACROILIAC JOINTS: Primary | ICD-10-CM

## 2025-04-15 PROCEDURE — 97113 AQUATIC THERAPY/EXERCISES: CPT

## 2025-04-15 NOTE — PROGRESS NOTES
Daily Note     Today's date: 4/15/2025  Patient name: Sandy Greer  : 1985  MRN: [id number]  Referring provider: Annia Price MD  Dx:   Encounter Diagnosis     ICD-10-CM    1. Pain of both sacroiliac joints  M53.3       2. Chronic pain syndrome  G89.4       3. Lumbar radiculopathy  M54.16           Start Time: 0900  Stop Time: 1000  Total time in clinic (min): 60 minutes    Subjective: pt notes back pain -7/10 today. Pt states the pain fluctuates all the time. She c/o sig R buttock/leg pain w/ ex's or standing or walking.       Objective: See treatment diary below      Assessment: Tolerated treatment fairly. Pt still struggles to stand up straight w/ gait and ex's. Pt can correct posture but only for a short period of time. Pain  continues w/ weakness in her legs and core. Difficulty w/ standing ex's and seated ex's. Pt does better in DW 7ft section.  Patient would benefit from continued PT      Plan: Continue per plan of care.      WC open and payable  POC expires Unit limit Auth Expiration date PT/OT + Visit Limit?                                 Visit/Unit Tracking  AUTH Status:  Date 3/28/ 4/1 4/4 4/11 4/15          No Auth Used 1 2 3 4 5           Remaining                 FOTO                   Precautions: standard    Daily Treatment Diary     Date 3/28 4/1 4/4 4/11 4/15      Patient education 5          Water Walk (FW, BW, side) x10’  5' 5 min 10 5' FB w/ N 5' w/ N      LE work at wall               Hip FF/ext swing    2 2 2        Toe/heel   1 1 1 1        Hip ABD/ADD   1 2 2 2         1 1 1        Knee flexion & extension   1 1 1        Squats  1 1 1 1      UE noodle x3             Push/pull   1 1 1 1        Rotate   1 1 1 1        Row forward & back              OH side bend            UE/Core (AROM, paddles, MB)              ABD/ADD              Horizontal Pec Fly              Alt. arm swing (shld flex/ext)              Push pull              Single paddle rotation           SLS  "(EO, EC, ball toss)            Aqua Step (FW, lat, eccentric)            Core work:              MF press (red, blue, blk)             Kickboard press (blue, red)            Seated nerve floss     x10ea      Seated on bench             ankle DF/PF    1 1 1 March    1 1 1        ABD/ADD      1        Knee flexion/extension    1 1 1      DW TX - hang in the deep water  10'/ 5' 10 10 10/5' 10,5        DW ABD/ADD  2 2  2 2        DW Bicycle  3 5 6 6 6        DW Scissor hip flexion/extension      1        DW ankle DF/PF 2 2  2       Stretches              HS at step   2 2 15\"x3 ea  On bench x3        Wall stretches              piriformis     10\"x3 ea x3        Nerve glides    10x ea       Hot Tub - 10 minutes only   10 10 10' 10                      "

## 2025-04-18 ENCOUNTER — APPOINTMENT (OUTPATIENT)
Dept: PHYSICAL THERAPY | Age: 40
End: 2025-04-18
Payer: OTHER MISCELLANEOUS

## 2025-04-22 ENCOUNTER — OFFICE VISIT (OUTPATIENT)
Dept: PHYSICAL THERAPY | Age: 40
End: 2025-04-22
Payer: OTHER MISCELLANEOUS

## 2025-04-22 DIAGNOSIS — G89.4 CHRONIC PAIN SYNDROME: ICD-10-CM

## 2025-04-22 DIAGNOSIS — M54.16 LUMBAR RADICULOPATHY: ICD-10-CM

## 2025-04-22 DIAGNOSIS — M53.3 PAIN OF BOTH SACROILIAC JOINTS: Primary | ICD-10-CM

## 2025-04-22 PROCEDURE — 97113 AQUATIC THERAPY/EXERCISES: CPT

## 2025-04-22 NOTE — PROGRESS NOTES
Daily Note     Today's date: 2025  Patient name: Sandy Greer  : 1985  MRN: [id number]  Referring provider: Annia Price MD  Dx:   Encounter Diagnosis     ICD-10-CM    1. Pain of both sacroiliac joints  M53.3       2. Chronic pain syndrome  G89.4       3. Lumbar radiculopathy  M54.16           Start Time: 0900  Stop Time: 1000  Total time in clinic (min): 60 minutes    Subjective: pt c/o severe pain since last Thursday when she bent down to get a roast out of the oven. She notes she has been in severe pain since.       Objective: See treatment diary below      Assessment: Tolerated treatment poor. Today pt came to PT in sig forward position. She notes the pain has been worse since last Thursday. Pt to go to [facility] on Monday. Pt unable to do ex's out of the deep water today, unable to do seated ex's today, limited water walking due to increased pain. Some relief found in DW w/ TX. Decreased pain post session but pt still very forward w/ posture and gait w/ sig difficulty to stand erect. Patient would benefit from continued PT      Plan: Continue per plan of care.        WC open and payable  POC expires Unit limit Auth Expiration date PT/OT + Visit Limit?                                 Visit/Unit Tracking  AUTH Status:  Date 3/28/ 4/1 4/4 4/11 4/15 4/22         No Auth Used 1 2 3 4 5 6          Remaining                 FOTO                   Precautions: standard    Daily Treatment Diary     Date 3/28 4/1 4/4 4/11 4/15 4/22     Patient education 5          Water Walk (FW, BW, side) x10’  5' 5 min 10 5' FB w/ N 5' w/ N 4w/ N     LE work at wall               Hip FF/ext swing    2 2 2 2       Toe/heel   1 1 1 1 1       Hip ABD/ADD   1 2 2 2 2        1 1 1 1       Knee flexion & extension   1 1 1 1       Squats  1 1 1 1 1     UE noodle x3             Push/pull   1 1 1 1        Rotate   1 1 1 1        Row forward & back              OH side bend            UE/Core (AROM, paddles, MB)               "ABD/ADD              Horizontal Pec Fly              Alt. arm swing (shld flex/ext)              Push pull              Single paddle rotation           SLS (EO, EC, ball toss)            Aqua Step (FW, lat, eccentric)            Core work:              MF press (red, blue, blk)             Kickboard press (blue, red)            Seated nerve floss     x10ea nt     Seated on bench             ankle DF/PF    1 1 1 nt       March    1 1 1 nt       ABD/ADD      1 nt       Knee flexion/extension    1 1 1 nt     DW TX - hang in the deep water  10'/ 5' 10 10 10/5' 10,5 10'10       DW ABD/ADD  2 2  2 2 2,2       DW Bicycle  3 5 6 6 6 5,5       DW Scissor hip flexion/extension      1 2,2       DW ankle DF/PF 2 2  2       Stretches              HS at step   2 2 15\"x3 ea  On bench x3 nt       Wall stretches              piriformis     10\"x3 ea x3 nt       Nerve glides    10x ea       Hot Tub - 10 minutes only   10 10 10' 10 10                       "

## 2025-04-25 ENCOUNTER — OFFICE VISIT (OUTPATIENT)
Dept: PHYSICAL THERAPY | Age: 40
End: 2025-04-25
Payer: OTHER MISCELLANEOUS

## 2025-04-25 DIAGNOSIS — M54.16 LUMBAR RADICULOPATHY: ICD-10-CM

## 2025-04-25 DIAGNOSIS — M53.3 PAIN OF BOTH SACROILIAC JOINTS: Primary | ICD-10-CM

## 2025-04-25 DIAGNOSIS — G89.4 CHRONIC PAIN SYNDROME: ICD-10-CM

## 2025-04-25 PROCEDURE — 97150 GROUP THERAPEUTIC PROCEDURES: CPT

## 2025-04-25 PROCEDURE — 97113 AQUATIC THERAPY/EXERCISES: CPT

## 2025-04-25 NOTE — PROGRESS NOTES
Daily Note     Today's date: 2025  Patient name: Sandy Greer  : 1985  MRN: [id number]  Referring provider: Annia Price MD  Dx:   Encounter Diagnosis     ICD-10-CM    1. Pain of both sacroiliac joints  M53.3       2. Chronic pain syndrome  G89.4       3. Lumbar radiculopathy  M54.16           Start Time: 0900  Stop Time: 1000  Total time in clinic (min): 60 minutes    Subjective: Patient reports better today but she still is having increased LBP and radiates down the into the butt friom pulling a roast out of the oven.       Objective: See treatment diary below      Assessment: Tolerated treatment fair, continue to modify program due to increased LBP. Responds well with DW program, pain with wb'ing. Patient demonstrated fatigue post treatment and would benefit from continued PT    Patient seen 1:1 for 30 minutes and rest of time group setting.        Plan: Continue per plan of care.      WC open and payable  POC expires Unit limit Auth Expiration date PT/OT + Visit Limit?                                 Visit/Unit Tracking  AUTH Status:  Date 3/28/ 4/1 4/4 4/11 4/15 4/22 4/25        No Auth Used 1 2 3 4 5 6 7         Remaining                 FOTO                   Precautions: standard    Daily Treatment Diary     Date 3/28 4/1 4/4 4/11 4/15 4/22 4/25    Patient education 5          Water Walk (FW, BW, side) x10’  5' 5 min 10 5' FB w/ N 5' w/ N 4w/ N 5' w/N    LE work at wall               Hip FF/ext swing    2 2 2 2 2      Toe/heel   1 1 1 1 1 1      Hip ABD/ADD   1 2 2 2 2 2      March  1 1 1 1 1 1      Knee flexion & extension   1 1 1 1 1      Squats  1 1 1 1 1 1    UE noodle x3             Push/pull   1 1 1 1        Rotate   1 1 1 1        Row forward & back              OH side bend            UE/Core (AROM, paddles, MB)              ABD/ADD              Horizontal Pec Fly              Alt. arm swing (shld flex/ext)              Push pull              Single paddle rotation           SLS  "(EO, EC, ball toss)            Aqua Step (FW, lat, eccentric)            Core work:              MF press (red, blue, blk)             Kickboard press (blue, red)            Seated nerve floss     x10ea nt     Seated on bench             ankle DF/PF    1 1 1 nt       March    1 1 1 nt       ABD/ADD      1 nt       Knee flexion/extension    1 1 1 nt     DW TX - hang in the deep water  10'/ 5' 10 10 10/5' 10,5 10'10 10' 10'      DW ABD/ADD  2 2  2 2 2,2 2' 2'      DW Bicycle  3 5 6 6 6 5,5 5' 5'      DW Scissor hip flexion/extension      1 2,2 2' 2'      DW ankle DF/PF 2 2  2       Stretches              HS at step   2 2 15\"x3 ea  On bench x3 nt       Wall stretches              piriformis     10\"x3 ea x3 nt 10\"x3 ea      Nerve glides    10x ea       Hot Tub - 10 minutes only   10 10 10' 10 10 10'                        "

## 2025-04-28 ENCOUNTER — TELEPHONE (OUTPATIENT)
Age: 40
End: 2025-04-28

## 2025-04-28 ENCOUNTER — HOSPITAL ENCOUNTER (OUTPATIENT)
Dept: RADIOLOGY | Facility: HOSPITAL | Age: 40
Discharge: HOME | End: 2025-04-28
Attending: STUDENT IN AN ORGANIZED HEALTH CARE EDUCATION/TRAINING PROGRAM
Payer: OTHER MISCELLANEOUS

## 2025-04-28 VITALS
HEART RATE: 67 BPM | DIASTOLIC BLOOD PRESSURE: 86 MMHG | SYSTOLIC BLOOD PRESSURE: 138 MMHG | RESPIRATION RATE: 18 BRPM | OXYGEN SATURATION: 98 %

## 2025-04-28 DIAGNOSIS — M51.369 DDD (DEGENERATIVE DISC DISEASE), LUMBAR: ICD-10-CM

## 2025-04-28 DIAGNOSIS — M47.816 FACET ARTHROPATHY, LUMBAR: ICD-10-CM

## 2025-04-28 PROCEDURE — 64493 INJ PARAVERT F JNT L/S 1 LEV: CPT | Performed by: STUDENT IN AN ORGANIZED HEALTH CARE EDUCATION/TRAINING PROGRAM

## 2025-04-28 PROCEDURE — 64494 INJ PARAVERT F JNT L/S 2 LEV: CPT | Performed by: STUDENT IN AN ORGANIZED HEALTH CARE EDUCATION/TRAINING PROGRAM

## 2025-04-28 RX ORDER — BUPIVACAINE HYDROCHLORIDE 5 MG/ML
1.5 INJECTION, SOLUTION EPIDURAL; INTRACAUDAL; PERINEURAL ONCE
Status: COMPLETED | OUTPATIENT
Start: 2025-04-28 | End: 2025-04-28

## 2025-04-28 RX ADMIN — BUPIVACAINE HYDROCHLORIDE 1.5 ML: 5 INJECTION, SOLUTION EPIDURAL; INTRACAUDAL; PERINEURAL at 09:27

## 2025-04-28 NOTE — TELEPHONE ENCOUNTER
RN attempted to reach pt on phone, left a detailed VMMOM regarding same letting pt know that she may take her allergy medication just not anything fast acting for pain such as tylenol or ibuprofen for 8 hours after procedure.  C/b number provided if any further questions or concerns.

## 2025-04-28 NOTE — DISCHARGE INSTR - LAB

## 2025-04-28 NOTE — H&P
History of Present Illness: The patient is a 40 y.o. female who presents with complaints of low back pain.    Past Medical History:   Diagnosis Date    Brain aneurysm        Past Surgical History:   Procedure Laterality Date    BRAIN SURGERY      coils, MRI compat.         Current Outpatient Medications:     acetaminophen (TYLENOL) 500 mg tablet, Take 500 mg by mouth every 6 (six) hours as needed, Disp: , Rfl:     aspirin 81 mg chewable tablet, Chew 81 mg daily, Disp: , Rfl:     Butalbital-APAP-Caffeine (Fioricet) -40 MG CAPS, Take 1 to 2 tablets every 4 hours as needed, do not take more than 6 tablets in a day., Disp: 20 capsule, Rfl: 0    gabapentin (NEURONTIN) 300 mg capsule, Take 300 mg by mouth daily at bedtime, Disp: , Rfl:     Meloxicam 10 MG CAPS, , Disp: , Rfl:     methocarbamol (ROBAXIN) 500 mg tablet, take 1 tablet by mouth three times a day if needed for muscle spasm, Disp: 90 tablet, Rfl: 0    potassium chloride (Klor-Con M20) 20 mEq tablet, take 1 tablet by mouth once daily for 15 DAYS, Disp: , Rfl:     Riboflavin (Vitamin B-2) 100 MG TABS, Take 400 mg by mouth daily, Disp: , Rfl:     topiramate (TOPAMAX) 100 mg tablet, Take 100 mg by mouth, Disp: , Rfl:     No Known Allergies    Physical Exam:   Vitals:    04/28/25 0859   BP: 130/85   Pulse: 68   Resp: 18   SpO2: 98%     General: Awake, Alert, Oriented x 3, Mood and affect appropriate  Respiratory: Respirations even and unlabored  Cardiovascular: Peripheral pulses intact; no edema  Musculoskeletal Exam: uneven gait    ASA Score: 2    Patient/Chart Verification  Patient ID Verified: Verbal  ID Band Applied: No  Consents Confirmed: To be obtained in the Procedural area  H&P( within 30 days) Verified: To be obtained in the Procedural area  Interval H&P(within 24 hr) Complete (required for Outpatients and Surgery Admit only): To be obtained in the Procedural area  Allergies Reviewed: Yes  Anticoag/NSAID held?: NA  Currently on antibiotics?:  No  Pregnancy denied?: Yes (pt denies)    Assessment:   1. Facet arthropathy, lumbar    2. DDD (degenerative disc disease), lumbar        Plan: right L3, L4, L5 medial branch block # 1

## 2025-04-28 NOTE — TELEPHONE ENCOUNTER
Caller: Patient    Doctor: Dr. Price    Reason for call: Would like to know if she is able to take allergies meds after today procedure    Can also send a [employer] message    Call back#:

## 2025-04-30 ENCOUNTER — OFFICE VISIT (OUTPATIENT)
Dept: PHYSICAL THERAPY | Age: 40
End: 2025-04-30
Attending: STUDENT IN AN ORGANIZED HEALTH CARE EDUCATION/TRAINING PROGRAM
Payer: OTHER MISCELLANEOUS

## 2025-04-30 DIAGNOSIS — M54.16 LUMBAR RADICULOPATHY: ICD-10-CM

## 2025-04-30 DIAGNOSIS — M53.3 PAIN OF BOTH SACROILIAC JOINTS: Primary | ICD-10-CM

## 2025-04-30 DIAGNOSIS — G89.4 CHRONIC PAIN SYNDROME: ICD-10-CM

## 2025-04-30 PROCEDURE — 97113 AQUATIC THERAPY/EXERCISES: CPT

## 2025-04-30 NOTE — PROGRESS NOTES
Daily Note     Today's date: 2025  Patient name: Sandy Greer  : 1985  MRN: [id number]  Referring provider: Annia Price MD  Dx:   Encounter Diagnosis     ICD-10-CM    1. Pain of both sacroiliac joints  M53.3       2. Chronic pain syndrome  G89.4       3. Lumbar radiculopathy  M54.16                      Subjective: Patient reports that she was sore yesterday but is feeling better this morning.       Objective: See treatment diary below      Assessment: Tolerated treatment fair, continues to feel relief from deep water exercises. She demonstrated a good understanding of her current program. Patient demonstrated fatigue post treatment and would benefit from continued PT          Plan: Continue per plan of care.      WC open and payable  POC expires Unit limit Auth Expiration date PT/OT + Visit Limit?                                 Visit/Unit Tracking  AUTH Status:  Date 3/28/ 4/1 4/4 4/11 4/15 4/22 4/25 4/30       No Auth Used 1 2 3 4 5 6 7 8        Remaining                 FOTO                   Precautions: standard    Daily Treatment Diary     Date 3/28 4/1 4/4 4/11 4/15 4/22 4/25 4/30   Patient education 5          Water Walk (FW, BW, side) x10’  5' 5 min 10 5' FB w/ N 5' w/ N 4w/ N 5' w/N 5' w/N   LE work at wall               Hip FF/ext swing    2 2 2 2 2 2     Toe/heel   1 1 1 1 1 1 1     Hip ABD/ADD   1 2 2 2 2 2 2     March  1 1 1 1 1 1 1     Knee flexion & extension   1 1 1 1 1 1     Squats  1 1 1 1 1 1 1   UE noodle x3             Push/pull   1 1 1 1        Rotate   1 1 1 1        Row forward & back              OH side bend            UE/Core (AROM, paddles, MB)              ABD/ADD              Horizontal Pec Fly              Alt. arm swing (shld flex/ext)              Push pull              Single paddle rotation           SLS (EO, EC, ball toss)            Aqua Step (FW, lat, eccentric)            Core work:              MF press (red, blue, blk)             Kickboard press (blue,  "red)            Seated nerve floss     x10ea nt     Seated on bench             ankle DF/PF    1 1 1 nt       March    1 1 1 nt       ABD/ADD      1 nt       Knee flexion/extension    1 1 1 nt     DW TX - hang in the deep water  10'/ 5' 10 10 10/5' 10,5 10'10 10' 10' 10 10'     DW ABD/ADD  2 2  2 2 2,2 2' 2' 2' 2'     DW Bicycle  3 5 6 6 6 5,5 5' 5' 5' 5'     DW Scissor hip flexion/extension      1 2,2 2' 2' 2' 2'     DW ankle DF/PF 2 2  2       Stretches              HS at step   2 2 15\"x3 ea  On bench x3 nt       Wall stretches              piriformis     10\"x3 ea x3 nt 10\"x3 ea 10\"x3 ea     Nerve glides    10x ea       Hot Tub - 10 minutes only   10 10 10' 10 10 10' 10'                       "

## 2025-05-02 ENCOUNTER — OFFICE VISIT (OUTPATIENT)
Dept: PAIN MEDICINE | Facility: CLINIC | Age: 40
End: 2025-05-02
Payer: OTHER MISCELLANEOUS

## 2025-05-02 ENCOUNTER — APPOINTMENT (OUTPATIENT)
Dept: PHYSICAL THERAPY | Age: 40
End: 2025-05-02
Attending: STUDENT IN AN ORGANIZED HEALTH CARE EDUCATION/TRAINING PROGRAM
Payer: OTHER MISCELLANEOUS

## 2025-05-02 VITALS — WEIGHT: 228 LBS | HEIGHT: 69 IN | BODY MASS INDEX: 33.77 KG/M2

## 2025-05-02 DIAGNOSIS — M47.816 LUMBAR SPONDYLOSIS: Primary | ICD-10-CM

## 2025-05-02 DIAGNOSIS — G89.4 CHRONIC PAIN SYNDROME: ICD-10-CM

## 2025-05-02 PROCEDURE — 99214 OFFICE O/P EST MOD 30 MIN: CPT | Performed by: STUDENT IN AN ORGANIZED HEALTH CARE EDUCATION/TRAINING PROGRAM

## 2025-05-02 NOTE — PROGRESS NOTES
"Assessment:  1. Lumbar spondylosis    2. Chronic pain syndrome        Portions of the record may have been created with voice recognition software. Occasional wrong word or \"sound a like\" substitutions may have occurred due to the inherent limitations of voice recognition software. Read the chart carefully and recognize, using context, where substitutions have occurred. Contact me with any questions.       Plan:  40-year-old female here for a follow-up visit with regards to chronic low back pain symptoms.  Since last visit, she underwent a right lumbar medial branch block L3, L4, L5 #1 on 4/28/2025 and notes greater than 80% relief for over 2 hours.  Denies other significant issues or concerns today.      Will schedule for right lumbar medial branch block L3, L4, L5 #2 in the near future.      Follow-up after lumbar MBB #2.      Complete risks and benefits including bleeding, infection, tissue reaction, nerve injury and allergic reaction were discussed. The approach was demonstrated using models and literature was provided. Verbal and written consent was obtained.        History of Present Illness:  The patient is a 40 y.o. female who presents for a follow up office visit in regards to Back Pain and Buttocks Pain.      I have personally reviewed and/or updated the patient's past medical history, past surgical history, family history, social history, current medications, allergies, and vital signs today.       Review of Systems  Review of Systems   Constitutional:  Negative for unexpected weight change.   HENT:  Negative for hearing loss.    Eyes:  Negative for visual disturbance.   Respiratory:  Negative for shortness of breath.    Cardiovascular:  Negative for leg swelling.   Gastrointestinal:  Negative for constipation.   Endocrine: Negative for polyuria.   Genitourinary:  Negative for difficulty urinating.   Musculoskeletal:  Positive for gait problem. Negative for joint swelling and myalgias.   Skin:  Negative " "for rash.   Neurological:  Positive for dizziness. Negative for weakness and headaches.   Psychiatric/Behavioral:  Negative for decreased concentration.    All other systems reviewed and are negative.        Past Medical History:   Diagnosis Date    Brain aneurysm        Past Surgical History:   Procedure Laterality Date    BRAIN SURGERY      coils, MRI compat.       Family History   Problem Relation Age of Onset    Aneurysm Mother     Heart attack Mother     Schizophrenia Father     Diabetes Father        Social History     Occupational History    Not on file   Tobacco Use    Smoking status: Never    Smokeless tobacco: Never   Vaping Use    Vaping status: Never Used   Substance and Sexual Activity    Alcohol use: Not Currently     Comment: rare    Drug use: Never    Sexual activity: Not on file         Current Outpatient Medications:     acetaminophen (TYLENOL) 500 mg tablet, Take 500 mg by mouth every 6 (six) hours as needed, Disp: , Rfl:     aspirin 81 mg chewable tablet, Chew 81 mg daily, Disp: , Rfl:     Butalbital-APAP-Caffeine (Fioricet) -40 MG CAPS, Take 1 to 2 tablets every 4 hours as needed, do not take more than 6 tablets in a day., Disp: 20 capsule, Rfl: 0    gabapentin (NEURONTIN) 300 mg capsule, Take 300 mg by mouth daily at bedtime, Disp: , Rfl:     Meloxicam 10 MG CAPS, , Disp: , Rfl:     methocarbamol (ROBAXIN) 500 mg tablet, take 1 tablet by mouth three times a day if needed for muscle spasm, Disp: 90 tablet, Rfl: 0    potassium chloride (Klor-Con M20) 20 mEq tablet, take 1 tablet by mouth once daily for 15 DAYS, Disp: , Rfl:     Riboflavin (Vitamin B-2) 100 MG TABS, Take 400 mg by mouth daily, Disp: , Rfl:     topiramate (TOPAMAX) 100 mg tablet, Take 100 mg by mouth, Disp: , Rfl:     No Known Allergies    Physical Exam:    Ht 5' 9\" (1.753 m)   Wt 103 kg (228 lb)   BMI 33.67 kg/m²     Constitutional:normal, well developed, well nourished, alert, in no distress and non-toxic and no overt " pain behavior.  Eyes:anicteric  HEENT:grossly intact  Neck:supple, symmetric, trachea midline and no masses   Pulmonary:even and unlabored  Cardiovascular:No edema or pitting edema present  Skin:Normal without rashes or lesions and well hydrated  Psychiatric:Mood and affect appropriate  Neurologic:Cranial Nerves II-XII grossly intact  Musculoskeletal: uneven gait    Imaging  FL spine and pain procedure    (Results Pending)       Orders Placed This Encounter   Procedures    FL spine and pain procedure

## 2025-05-06 ENCOUNTER — EVALUATION (OUTPATIENT)
Dept: PHYSICAL THERAPY | Age: 40
End: 2025-05-06
Attending: STUDENT IN AN ORGANIZED HEALTH CARE EDUCATION/TRAINING PROGRAM
Payer: OTHER MISCELLANEOUS

## 2025-05-06 DIAGNOSIS — G89.4 CHRONIC PAIN SYNDROME: ICD-10-CM

## 2025-05-06 DIAGNOSIS — M53.3 PAIN OF BOTH SACROILIAC JOINTS: Primary | ICD-10-CM

## 2025-05-06 DIAGNOSIS — M54.16 LUMBAR RADICULOPATHY: ICD-10-CM

## 2025-05-06 PROCEDURE — 97113 AQUATIC THERAPY/EXERCISES: CPT | Performed by: PHYSICAL THERAPIST

## 2025-05-06 NOTE — PROGRESS NOTES
PT Re-Evaluation     Today's date: 2025  Patient name: Sandy Greer  : 1985  MRN: [id number]  Referring provider: Annia Price MD  Dx:   Encounter Diagnosis     ICD-10-CM    1. Pain of both sacroiliac joints  M53.3       2. Chronic pain syndrome  G89.4       3. Lumbar radiculopathy  M54.16           Start Time: 1015  Stop Time: 1110  Total time in clinic (min): 55 minutes    Assessment  Impairments: abnormal gait, abnormal or restricted ROM, activity intolerance, impaired physical strength, lacks appropriate home exercise program, pain with function, poor posture , poor body mechanics, participation limitations and activity limitations  Functional limitations: sitting, walking, stadning, wrok duties, caring for household and children  Symptom irritability: high    Assessment details: Problem list:  1. Central sensitization/chronic pain syndrome  2. Poor lumbar mobility with global loss of motion  3. Generalized weakness with right LE weakness    Sandy Greer is a pleasant 39 yo female who presents with chronic low back pain radiating into right buttocks and right leg > left after sustaining a work related injury in . She has persistent pain without relief from prior conservative means including land and aquatic PT and injections. She has high pain levels that are produced with movement. She has global loss of lumbar mobility with pain produced in all directions with greatest loss of lumbar extension. She is hyperlordotic but has difficulty maintaining an erect standing posture. She describes sensory changes with loss of sensation to light touch in great toe but otherwise non-specific dermatomal pattern. Reflexes are maintained. She has inhibition weakness in her LE due to pain reproduction and difficult to assess due to high symptom irritability. Standing posture is poor. Transitional movements including sit to stand and bed mobility are slow and guarded.   At this time, Sandy will benefit from  skilled PT services including aquatic therapy with graded exercise to promote movement, general conditioning, and postural control to decrease pain and improve overall function.      5/6/25  Pt continues to have persistent pain. She had temporary relief with injection. She continues to have restricted motion as noted without change. Her posture remains poor in standing and walking and she is unable to achieve the neutral standing posture. Her tolerance for aquatic activity is fair and varies from day to day. Her FOTO score has unchanged.   At this time we will continue with aquatic PT to continue to address her pain, posture and poor mobility.   Barriers to therapy: Central sensitization  Understanding of Dx/Px/POC: fair     Prognosis: fair  Prognosis details: Negative prognostic indicators:  Failed response to prior conservative treatments, central sensitization    Goals  STG 4 weeks:  1. Pt will tolerate up to 10min of walking in an aquatic environment. unable  2. Pt will be able to tolerate up to 45 min of low intensity movement in aquatic environment.pt tolerates deep water movement but not WB activity  LTG 10 weeks:  1. Pt will be able to perform an independent fitness program by d/c.  2. Pt will demonstrate upright posture for standing and walking.    Plan    Planned therapy interventions: aquatic therapy, postural training, neuromuscular re-education, home exercise program, graded exercise, functional ROM exercises, therapeutic activities, therapeutic exercise and strengthening    Frequency: 2x week  Plan of Care beginning date: 3/28/2025  Plan of Care expiration date: 7/31/2025  Treatment plan discussed with: patient  Plan details: 16-20 visits        Subjective Evaluation    History of Present Illness  Date of onset: 10/2/2023  Mechanism of injury: trauma  Mechanism of injury: Pt sustained a work related injury 10/2/23. Pt noted at work the fire sprinkler malfunctioned at work and she tried to lift a trash  can full of water to empty it. She felt pain in her back and tried to stretch her back. She was unable to get get back up for the floor She was unable to drive home.     She has had injections and initially had no relief. The second injection did make the pain in her buttocks better but she is still unable to sit for long periods. She does use a home TENS and gets a little relief with use.   She is going to try RFA.     She has pain with sitting for long periods, she has difficulty sleeping and is unable to get comfortable. She sleeps at times in her recliner.  She has pain with bending, difficulty putting on shoes and socks.     She has had prior PT both land based and prior aquatic based PT.    Pt's goals with PT are to be able feel better and be able to move.     25  Persistent pain without significant pain, worse with damp weather. She did have injection with 3 hour decrease in pain. She is scheduled for ablation.  Patient Goals  Patient goals for therapy: decreased pain, increased motion and return to sport/leisure activities    Pain  Current pain ratin  At worst pain rating: 10  Location: low back radiates to right buttock > left  Quality: sharp and radiating  Relieving factors: medications (OTC, TENS, recliner)  Aggravating factors: sitting, standing and walking  Progression: no change    Social Support  Lives with: spouse and young children          Objective     Concurrent Complaints      Additional Special Questions  No red flags    Neurological Testing     Sensation     Lumbar     Right   Diminished: light touch  Paresthesia: light touch    Comments   Right light touch: right foot paresthesias    Reflexes   Left   Patellar (L4): normal (2+)  Achilles (S1): normal (2+)    Right   Patellar (L4): normal (2+)  Achilles (S1): normal (2+)    Additional Neurological Details  Diminished sensation on great toe    Active Range of Motion     Lumbar   Flexion:  with pain Restriction level:  moderate  Extension:  with pain Restriction level: maximal  Left lateral flexion:  with pain Restriction level: maximal  Right lateral flexion:  with pain Restriction level: maximal  Left rotation:  with pain Restriction level: maximal  Right rotation:  with pain Restriction level: maximal    Strength/Myotome Testing     Left Hip   Planes of Motion   Flexion: 3    Right Hip   Planes of Motion   Flexion: 3-    Left Knee   Flexion: 4-  Extension: 4-    Right Knee   Flexion: 3+  Extension: 4-    Left Ankle/Foot   Dorsiflexion: 5  Plantar flexion: 5  Great toe extension: 5    Right Ankle/Foot   Dorsiflexion: 4-  Plantar flexion: 4-  Great toe extension: 4           WC open and payable  POC expires Unit limit Auth Expiration date PT/OT + Visit Limit?                                 Visit/Unit Tracking  AUTH Status:  Date 3/28/ 4/1 4/4 4/11 4/15 4/22 4/25 4/30 5/6      No Auth Used 1 2 3 4 5 6 7 8 9       Remaining                 FOTO                   Precautions: standard    Daily Treatment Diary     Date 5/6 4/1 4/4 4/11 4/15 4/22 4/25 4/30   Patient education           Water Walk (FW, BW, side) x10’  5' 5 min 10 5' FB w/ N 5' w/ N 4w/ N 5' w/N 5' w/N   LE work at wall               Hip FF/ext swing    2 2 2 2 2 2     Toe/heel   1 1 1 1 1 1 1     Hip ABD/ADD   1 2 2 2 2 2 2     March  1 1 1 1 1 1 1     Knee flexion & extension   1 1 1 1 1 1     Squats  1 1 1 1 1 1 1   UE noodle x3             Push/pull   1 1 1 1        Rotate   1 1 1 1        Row forward & back              OH side bend            UE/Core (AROM, paddles, MB)              ABD/ADD              Horizontal Pec Fly              Alt. arm swing (shld flex/ext)              Push pull              Single paddle rotation           SLS (EO, EC, ball toss)            Aqua Step (FW, lat, eccentric)            Core work:              MF press (red, blue, blk)             Kickboard press (blue, red)            Seated nerve floss     x10ea nt     Seated on bench       "       ankle DF/PF    1 1 1 nt       March    1 1 1 nt       ABD/ADD      1 nt       Knee flexion/extension    1 1 1 nt     DW TX - hang in the deep water  10'/ 10 10 10 10/5' 10,5 10'10 10' 10' 10 10'     DW ABD/ADD  2/2 2  2 2 2,2 2' 2' 2' 2'     DW Bicycle  6/6 5 6 6 6 5,5 5' 5' 5' 5'     DW Scissor hip flexion/extension  2/2    1 2,2 2' 2' 2' 2'     DW ankle DF/PF 2 2  2       Stretches              HS at step   2 2 15\"x3 ea  On bench x3 nt       Wall stretches              piriformis     10\"x3 ea x3 nt 10\"x3 ea 10\"x3 ea     Nerve glides    10x ea       Hot Tub - 10 minutes only  10 10 10 10' 10 10 10' 10'                    "

## 2025-06-02 ENCOUNTER — HOSPITAL ENCOUNTER (OUTPATIENT)
Dept: RADIOLOGY | Facility: HOSPITAL | Age: 40
Discharge: HOME | End: 2025-06-02
Attending: STUDENT IN AN ORGANIZED HEALTH CARE EDUCATION/TRAINING PROGRAM | Admitting: STUDENT IN AN ORGANIZED HEALTH CARE EDUCATION/TRAINING PROGRAM
Payer: OTHER MISCELLANEOUS

## 2025-06-02 VITALS
DIASTOLIC BLOOD PRESSURE: 83 MMHG | SYSTOLIC BLOOD PRESSURE: 147 MMHG | HEART RATE: 62 BPM | TEMPERATURE: 97.3 F | RESPIRATION RATE: 20 BRPM | OXYGEN SATURATION: 100 %

## 2025-06-02 DIAGNOSIS — M47.816 LUMBAR SPONDYLOSIS: ICD-10-CM

## 2025-06-02 PROCEDURE — 64494 INJ PARAVERT F JNT L/S 2 LEV: CPT | Performed by: STUDENT IN AN ORGANIZED HEALTH CARE EDUCATION/TRAINING PROGRAM

## 2025-06-02 PROCEDURE — 64493 INJ PARAVERT F JNT L/S 1 LEV: CPT | Performed by: STUDENT IN AN ORGANIZED HEALTH CARE EDUCATION/TRAINING PROGRAM

## 2025-06-02 RX ORDER — BUPIVACAINE HYDROCHLORIDE 5 MG/ML
1.5 INJECTION, SOLUTION EPIDURAL; INTRACAUDAL; PERINEURAL ONCE
Status: COMPLETED | OUTPATIENT
Start: 2025-06-02 | End: 2025-06-02

## 2025-06-02 RX ADMIN — BUPIVACAINE HYDROCHLORIDE 1.5 ML: 5 INJECTION, SOLUTION EPIDURAL; INTRACAUDAL; PERINEURAL at 10:19

## 2025-06-02 NOTE — DISCHARGE INSTR - LAB

## 2025-06-04 ENCOUNTER — OFFICE VISIT (OUTPATIENT)
Dept: PAIN MEDICINE | Facility: CLINIC | Age: 40
End: 2025-06-04
Payer: OTHER MISCELLANEOUS

## 2025-06-04 VITALS
SYSTOLIC BLOOD PRESSURE: 136 MMHG | HEIGHT: 69 IN | BODY MASS INDEX: 33.77 KG/M2 | DIASTOLIC BLOOD PRESSURE: 91 MMHG | WEIGHT: 228 LBS

## 2025-06-04 DIAGNOSIS — M51.369 DEGENERATION OF INTERVERTEBRAL DISC OF LUMBAR REGION, UNSPECIFIED WHETHER PAIN PRESENT: ICD-10-CM

## 2025-06-04 DIAGNOSIS — G89.4 CHRONIC PAIN SYNDROME: Primary | ICD-10-CM

## 2025-06-04 DIAGNOSIS — M54.50 CHRONIC RIGHT-SIDED LOW BACK PAIN WITHOUT SCIATICA: ICD-10-CM

## 2025-06-04 DIAGNOSIS — G89.29 CHRONIC RIGHT-SIDED LOW BACK PAIN WITHOUT SCIATICA: ICD-10-CM

## 2025-06-04 PROCEDURE — 99213 OFFICE O/P EST LOW 20 MIN: CPT | Performed by: STUDENT IN AN ORGANIZED HEALTH CARE EDUCATION/TRAINING PROGRAM

## 2025-06-04 NOTE — PATIENT INSTRUCTIONS
We discussed acupuncture as an option to help with pain symptoms.  One option - Dr Davi Munoz MD - [facility]. [phone]   We also discussed  trial of chiropractic treatment.

## 2025-06-04 NOTE — ASSESSMENT & PLAN NOTE
40-year-old female here for a follow-up visit with regards to chronic right-sided low back pain symptoms.  Since last visit, she underwent a second right lumbar medial branch block L3, L4, L5 on 6/2/2025 and did not notice significant relief with this.  Notes ongoing pain limited difficulty with function and ambulation, denies new or progressive weakness, saddle anesthesia, BBI. She has previously also undergone sacroiliac joint injection, lumbar PONCE without much benefit. She is continuing physical therapy with limited improvement.    No further interventional treatment planned at this time.    Discussed follow up with neurosurgery for persistent pain symptoms, failure of conservative treatment.    Also discussed complimentary medicine techniques including acupuncture, chiropractic treatment for symptom management.    Follow up as needed.      Orders:    Ambulatory Referral to Chiropractic; Future    Ambulatory referral to Physical Therapy; Future

## 2025-06-04 NOTE — PROGRESS NOTES
"Name: Sandy Greer      : 1985      MRN: [id number]  Encounter Provider: Annia Price MD  Encounter Date: 2025   Encounter department: [facility] [employer]  :  Assessment & Plan        {Spine and Pain Plan Statements (Optional):09510}    My impressions and treatment recommendations were discussed in detail with the patient who verbalized understanding and had no further questions.  Discharge instructions were provided. I personally saw and examined the patient and I agree with the above discussed plan of care.    History of Present Illness {?Quick Links Encounters * My Last Note * Last Note in Specialty * Snapshot * Since Last Visit * History :17152}    Sandy Greer is a 40 y.o. female who presents for a follow up office visit in regards to Back Pain. The patient’s current symptoms include ***    {Spine and Pain Assessments (Optional):14871}    Opioid contract date    Last UDS Date: No results in last 5 years                    last taken on    Review of Systems    Medical History Reviewed by provider this encounter:     .  {Select to insert medical history sections (Optional):49794}     Objective {?Quick Links Trend Vitals * Enter New Vitals * Results Review * Timeline (Adult) * Labs * Imaging * Cardiology * Procedures * Lung Cancer Screening * Surgical eConsent :17093}  Ht 5' 9\" (1.753 m)   BMI 33.67 kg/m²         Physical Exam  Constitutional: {General Appearance:28956::\"normal, well developed, well nourished, alert, in no distress and non-toxic and no overt pain behavior.\"}  Eyes: {Sclera:76961::\"anicteric\"}  HEENT: {Hearin::\"grossly intact\"}  Neck: {Neck:95537::\"supple, symmetric, trachea midline and no masses \"}  Pulmonary: {Respiratory effort:98056::\"even and unlabored\"}  Cardiovascular: {Examination of Extremities:68814::\"No edema or pitting edema present\"}  Skin: {Skin and Subcutaneous tissues:82051::\"Normal without rashes or lesions and well " "hydrated\"}  Psychiatric: {Mood and Affect:63098::\"Mood and affect appropriate\"}  Neurologic: {Cranial Nerves:92699::\"Cranial Nerves II-XII grossly intact\"}  Musculoskeletal: {Gait and Station:32755::\"normal\"}    {Radiology Results Review (Optional):81853}    {Administrative / Billing Section (Optional):62360}  "

## 2025-06-04 NOTE — PROGRESS NOTES
"Name: Sandy Greer      : 1985      MRN: [id number]  Encounter Provider: Annia Price MD  Encounter Date: 2025   Encounter department: [facility] [employer]  :    Portions of the record may have been created with voice recognition software. Occasional wrong word or \"sound a like\" substitutions may have occurred due to the inherent limitations of voice recognition software. Read the chart carefully and recognize, using context, where substitutions have occurred. Contact me with any questions.       Assessment & Plan  Chronic pain syndrome  Chronic right-sided low back pain without sciatica  Degeneration of intervertebral disc of lumbar region, unspecified whether pain present    40-year-old female here for a follow-up visit with regards to chronic right-sided low back pain symptoms.  Since last visit, she underwent a second right lumbar medial branch block L3, L4, L5 on 2025 and did not notice significant relief with this.  Notes ongoing pain limited difficulty with function and ambulation, denies new or progressive weakness, saddle anesthesia, BBI. She has previously also undergone sacroiliac joint injection, lumbar PONCE without much benefit. She is continuing physical therapy with limited improvement.    No further interventional treatment planned at this time.    Discussed follow up with neurosurgery for persistent pain symptoms, failure of conservative treatment.    Also discussed complimentary medicine techniques including acupuncture, chiropractic treatment for symptom management.    Follow up as needed.      Orders:    Ambulatory Referral to Chiropractic; Future    Ambulatory referral to Physical Therapy; Future        My impressions and treatment recommendations were discussed in detail with the patient who verbalized understanding and had no further questions.  Discharge instructions were provided. I personally saw and examined the patient and I agree with the above " "discussed plan of care.    History of Present Illness     Sandy Greer is a 40 y.o. female who presents for a follow up office visit in regards to Back Pain.       Review of Systems   Constitutional:  Negative for fever.   HENT:  Negative for sinus pain.    Eyes:  Negative for redness.   Respiratory:  Negative for shortness of breath.    Cardiovascular:  Negative for palpitations.   Gastrointestinal:  Negative for abdominal pain and nausea.   Endocrine: Negative for polydipsia.   Genitourinary:  Negative for difficulty urinating.   Musculoskeletal:  Positive for gait problem. Negative for myalgias.        Decreased ROM   Skin:  Negative for rash.   Neurological:  Positive for dizziness. Negative for seizures and headaches.   Psychiatric/Behavioral:  Negative for decreased concentration. The patient is not nervous/anxious.        Medical History Reviewed by provider this encounter:  Tobacco  Allergies  Meds  Problems  Med Hx  Surg Hx  Fam Hx     .  Medications Ordered Prior to Encounter[1]   Social History[2]     Objective   /91 (BP Location: Left arm, Patient Position: Sitting)   Ht 5' 9\" (1.753 m)   Wt 103 kg (228 lb)   BMI 33.67 kg/m²      Pain Score:   9  Physical Exam  Constitutional: normal, well developed, well nourished, alert, in no distress and non-toxic and no overt pain behavior.  Eyes: anicteric  HEENT: grossly intact  Neck: supple, symmetric, trachea midline and no masses   Pulmonary: even and unlabored  Cardiovascular: No edema or pitting edema present  Skin: Normal without rashes or lesions and well hydrated  Psychiatric: Mood and affect appropriate  Neurologic: Cranial Nerves II-XII grossly intact  Musculoskeletal: uneven gait       [1]   Current Outpatient Medications on File Prior to Visit   Medication Sig Dispense Refill    acetaminophen (TYLENOL) 500 mg tablet Take 500 mg by mouth every 6 (six) hours as needed      aspirin 81 mg chewable tablet Chew 81 mg in the morning.      " Butalbital-APAP-Caffeine (Fioricet) -40 MG CAPS Take 1 to 2 tablets every 4 hours as needed, do not take more than 6 tablets in a day. 20 capsule 0    gabapentin (NEURONTIN) 300 mg capsule Take 300 mg by mouth daily at bedtime      Meloxicam 10 MG CAPS       methocarbamol (ROBAXIN) 500 mg tablet take 1 tablet by mouth three times a day if needed for muscle spasm 90 tablet 0    potassium chloride (Klor-Con M20) 20 mEq tablet       Riboflavin (Vitamin B-2) 100 MG TABS Take 400 mg by mouth in the morning.      topiramate (TOPAMAX) 100 mg tablet Take 100 mg by mouth       No current facility-administered medications on file prior to visit.   [2]   Social History  Tobacco Use    Smoking status: Never    Smokeless tobacco: Never   Vaping Use    Vaping status: Never Used   Substance and Sexual Activity    Alcohol use: Not Currently     Comment: rare    Drug use: Never